# Patient Record
Sex: FEMALE | Race: WHITE | NOT HISPANIC OR LATINO | Employment: FULL TIME | URBAN - METROPOLITAN AREA
[De-identification: names, ages, dates, MRNs, and addresses within clinical notes are randomized per-mention and may not be internally consistent; named-entity substitution may affect disease eponyms.]

---

## 2019-06-10 ENCOUNTER — TRANSFERRED RECORDS (OUTPATIENT)
Dept: HEALTH INFORMATION MANAGEMENT | Facility: CLINIC | Age: 39
End: 2019-06-10

## 2019-06-10 LAB
HPV ABSTRACT: NORMAL
PAP-ABSTRACT: NORMAL

## 2019-07-23 ENCOUNTER — TRANSFERRED RECORDS (OUTPATIENT)
Dept: HEALTH INFORMATION MANAGEMENT | Facility: CLINIC | Age: 39
End: 2019-07-23

## 2019-12-01 ENCOUNTER — TRANSFERRED RECORDS (OUTPATIENT)
Dept: HEALTH INFORMATION MANAGEMENT | Facility: CLINIC | Age: 39
End: 2019-12-01

## 2019-12-01 LAB — PAP SMEAR - HIM PATIENT REPORTED: NEGATIVE

## 2020-05-27 ENCOUNTER — RECORDS - HEALTHEAST (OUTPATIENT)
Dept: ADMINISTRATIVE | Facility: OTHER | Age: 40
End: 2020-05-27

## 2020-05-27 ENCOUNTER — TRANSFERRED RECORDS (OUTPATIENT)
Dept: HEALTH INFORMATION MANAGEMENT | Facility: CLINIC | Age: 40
End: 2020-05-27

## 2020-08-11 ENCOUNTER — VIRTUAL VISIT (OUTPATIENT)
Dept: FAMILY MEDICINE | Facility: CLINIC | Age: 40
End: 2020-08-11
Payer: COMMERCIAL

## 2020-08-11 DIAGNOSIS — E03.9 HYPOTHYROIDISM, UNSPECIFIED TYPE: Primary | ICD-10-CM

## 2020-08-11 DIAGNOSIS — G43.009 MIGRAINE WITHOUT AURA AND WITHOUT STATUS MIGRAINOSUS, NOT INTRACTABLE: ICD-10-CM

## 2020-08-11 PROCEDURE — 99203 OFFICE O/P NEW LOW 30 MIN: CPT | Mod: 95 | Performed by: NURSE PRACTITIONER

## 2020-08-11 RX ORDER — AMITRIPTYLINE HYDROCHLORIDE 10 MG/1
10 TABLET ORAL AT BEDTIME
Qty: 90 TABLET | Refills: 1 | Status: SHIPPED | OUTPATIENT
Start: 2020-08-11 | End: 2022-03-29

## 2020-08-11 RX ORDER — LEVOTHYROXINE SODIUM 75 UG/1
75 TABLET ORAL DAILY
Qty: 90 TABLET | Refills: 3 | Status: SHIPPED | OUTPATIENT
Start: 2020-08-11 | End: 2021-08-12

## 2020-08-11 RX ORDER — LEVOTHYROXINE SODIUM 75 UG/1
75 TABLET ORAL DAILY
COMMUNITY
End: 2020-08-11

## 2020-08-11 NOTE — PROGRESS NOTES
"Dinorah Mai is a 40 year old female who is being evaluated via a billable video visit.      The patient has been notified of following:     \"This video visit will be conducted via a call between you and your physician/provider. We have found that certain health care needs can be provided without the need for an in-person physical exam.  This service lets us provide the care you need with a video conversation.  If a prescription is necessary we can send it directly to your pharmacy.  If lab work is needed we can place an order for that and you can then stop by our lab to have the test done at a later time.    Video visits are billed at different rates depending on your insurance coverage.  Please reach out to your insurance provider with any questions.    If during the course of the call the physician/provider feels a video visit is not appropriate, you will not be charged for this service.\"    Patient has given verbal consent for Video visit? Yes  How would you like to obtain your AVS? MyChart  If you are dropped from the video visit, the video invite should be resent to: mychart   Will anyone else be joining your video visit? No    Subjective     Dinorah Mai is a 40 year old female who presents today via video visit for the following health issues:    Health system         Video Start Time: 7:45 AM    Former care in [place].   Moved to [facility] here.      Hypothyroidism Follow-up      Since last visit, patient describes the following symptoms: Weight stable, no hair loss, no skin changes, no constipation, no loose stools      How many servings of fruits and vegetables do you eat daily?  4 or more    On average, how many sweetened beverages do you drink each day (Examples: soda, juice, sweet tea, etc.  Do NOT count diet or artificially sweetened beverages)?   0    How many days per week do you exercise enough to make your heart beat faster? 5    How many minutes a day do you exercise enough " to make your heart beat faster? 30 - 60    How many days per week do you miss taking your medication? 0      Using amitriptyline daily for migraine prophylaxis.    Working well, rare migraines except with her periods.    Was getting migraines 2-3 times a week.    MUCH better on elavil.        There is no problem list on file for this patient.    History reviewed. No pertinent surgical history.    Social History     Tobacco Use     Smoking status: Never Smoker     Smokeless tobacco: Never Used   Substance Use Topics     Alcohol use: Yes     Comment: 2 drinks a week      Family History   Problem Relation Age of Onset     No Known Problems Mother      No Known Problems Father          Current Outpatient Medications   Medication Sig Dispense Refill     levothyroxine (SYNTHROID/LEVOTHROID) 75 MCG tablet Take 75 mcg by mouth daily       No Known Allergies    Reviewed and updated as needed this visit by Provider         Review of Systems   Constitutional, HEENT, cardiovascular, pulmonary, GI, , musculoskeletal, neuro, skin, endocrine and psych systems are negative, except as otherwise noted.      Objective             Physical Exam     GENERAL: Healthy, alert and no distress  EYES: Eyes grossly normal to inspection.  No discharge or erythema, or obvious scleral/conjunctival abnormalities.  RESP: No audible wheeze, cough, or visible cyanosis.  No visible retractions or increased work of breathing.    SKIN: Visible skin clear. No significant rash, abnormal pigmentation or lesions.  NEURO: Cranial nerves grossly intact.  Mentation and speech appropriate for age.  PSYCH: Mentation appears normal, affect normal/bright, judgement and insight intact, normal speech and appearance well-groomed.              Assessment & Plan       ICD-10-CM    1. Hypothyroidism, unspecified type  E03.9 levothyroxine (SYNTHROID/LEVOTHROID) 75 MCG tablet     **TSH with free T4 reflex FUTURE anytime   2. Migraine without aura and without status  migrainosus, not intractable  G43.009 amitriptyline (ELAVIL) 10 MG tablet          Future labs placed   levothyroxine refilled at current dose.   F/u in 6 months after labs    Using amitriptyline daily for migraine prophylaxis.    Working well, rare migraines except with her periods.      PAP UTD    No follow-ups on file.    HUGH Carmen CNP  [facility]      Video-Visit Details    Type of service:  Video Visit    Video End Time: 7:55    Originating Location (pt. Location): Home    Distant Location (provider location):  [facility]     Platform used for Video Visit: [employer]    No follow-ups on file.       HUGH Carmen CNP

## 2021-01-04 ENCOUNTER — HEALTH MAINTENANCE LETTER (OUTPATIENT)
Age: 41
End: 2021-01-04

## 2021-04-07 ENCOUNTER — OFFICE VISIT - HEALTHEAST (OUTPATIENT)
Dept: FAMILY MEDICINE | Facility: CLINIC | Age: 41
End: 2021-04-07

## 2021-04-07 DIAGNOSIS — E06.3 HYPOTHYROIDISM DUE TO HASHIMOTO'S THYROIDITIS: ICD-10-CM

## 2021-04-07 DIAGNOSIS — F32.81 PMDD (PREMENSTRUAL DYSPHORIC DISORDER): ICD-10-CM

## 2021-04-07 DIAGNOSIS — N92.0 MENORRHAGIA WITH REGULAR CYCLE: ICD-10-CM

## 2021-04-07 LAB
ALBUMIN SERPL-MCNC: 4.4 G/DL (ref 3.5–5)
ALP SERPL-CCNC: 47 U/L (ref 45–120)
ALT SERPL W P-5'-P-CCNC: 25 U/L (ref 0–45)
ANION GAP SERPL CALCULATED.3IONS-SCNC: 11 MMOL/L (ref 5–18)
APTT PPP: 32 SECONDS (ref 24–37)
AST SERPL W P-5'-P-CCNC: 24 U/L (ref 0–40)
BILIRUB SERPL-MCNC: 0.4 MG/DL (ref 0–1)
BUN SERPL-MCNC: 11 MG/DL (ref 8–22)
CALCIUM SERPL-MCNC: 9.6 MG/DL (ref 8.5–10.5)
CHLORIDE BLD-SCNC: 104 MMOL/L (ref 98–107)
CO2 SERPL-SCNC: 26 MMOL/L (ref 22–31)
CREAT SERPL-MCNC: 0.77 MG/DL (ref 0.6–1.1)
ERYTHROCYTE [DISTWIDTH] IN BLOOD BY AUTOMATED COUNT: 12.2 % (ref 11–14.5)
ESTRADIOL SERPL-MCNC: 54 PG/ML
FERRITIN SERPL-MCNC: 53 NG/ML (ref 10–130)
FSH SERPL-ACNC: 5.1 MIU/ML
GFR SERPL CREATININE-BSD FRML MDRD: >60 ML/MIN/1.73M2
GLUCOSE BLD-MCNC: 92 MG/DL (ref 70–125)
HCT VFR BLD AUTO: 43.1 % (ref 35–47)
HGB BLD-MCNC: 14 G/DL (ref 12–16)
INR PPP: 1.01 (ref 0.9–1.1)
MCH RBC QN AUTO: 29.7 PG (ref 27–34)
MCHC RBC AUTO-ENTMCNC: 32.5 G/DL (ref 32–36)
MCV RBC AUTO: 91 FL (ref 80–100)
PLATELET # BLD AUTO: 300 THOU/UL (ref 140–440)
PMV BLD AUTO: 8.6 FL (ref 7–10)
POTASSIUM BLD-SCNC: 4.3 MMOL/L (ref 3.5–5)
PROGEST SERPL-MCNC: <0.1 NG/ML
PROT SERPL-MCNC: 7.5 G/DL (ref 6–8)
RBC # BLD AUTO: 4.72 MILL/UL (ref 3.8–5.4)
SODIUM SERPL-SCNC: 141 MMOL/L (ref 136–145)
T3FREE SERPL-MCNC: 2.8 PG/ML (ref 1.9–3.9)
T4 FREE SERPL-MCNC: 1 NG/DL (ref 0.7–1.8)
TSH SERPL DL<=0.005 MIU/L-ACNC: 2.61 UIU/ML (ref 0.3–5)
WBC: 6.8 THOU/UL (ref 4–11)

## 2021-04-07 ASSESSMENT — MIFFLIN-ST. JEOR: SCORE: 1232.06

## 2021-04-13 ENCOUNTER — COMMUNICATION - HEALTHEAST (OUTPATIENT)
Dept: FAMILY MEDICINE | Facility: CLINIC | Age: 41
End: 2021-04-13

## 2021-05-12 ENCOUNTER — OFFICE VISIT - HEALTHEAST (OUTPATIENT)
Dept: FAMILY MEDICINE | Facility: CLINIC | Age: 41
End: 2021-05-12

## 2021-05-12 DIAGNOSIS — F51.04 PSYCHOPHYSIOLOGICAL INSOMNIA: ICD-10-CM

## 2021-05-12 DIAGNOSIS — G89.29 CHRONIC NECK PAIN: ICD-10-CM

## 2021-05-12 DIAGNOSIS — Z12.31 VISIT FOR SCREENING MAMMOGRAM: ICD-10-CM

## 2021-05-12 DIAGNOSIS — M54.2 CHRONIC NECK PAIN: ICD-10-CM

## 2021-05-12 DIAGNOSIS — F43.0 ACUTE REACTION TO STRESS: ICD-10-CM

## 2021-05-12 ASSESSMENT — ANXIETY QUESTIONNAIRES
2. NOT BEING ABLE TO STOP OR CONTROL WORRYING: SEVERAL DAYS
IF YOU CHECKED OFF ANY PROBLEMS ON THIS QUESTIONNAIRE, HOW DIFFICULT HAVE THESE PROBLEMS MADE IT FOR YOU TO DO YOUR WORK, TAKE CARE OF THINGS AT HOME, OR GET ALONG WITH OTHER PEOPLE: NOT DIFFICULT AT ALL
GAD7 TOTAL SCORE: 3
5. BEING SO RESTLESS THAT IT IS HARD TO SIT STILL: NOT AT ALL
3. WORRYING TOO MUCH ABOUT DIFFERENT THINGS: SEVERAL DAYS
6. BECOMING EASILY ANNOYED OR IRRITABLE: NOT AT ALL
4. TROUBLE RELAXING: NOT AT ALL
7. FEELING AFRAID AS IF SOMETHING AWFUL MIGHT HAPPEN: NOT AT ALL
1. FEELING NERVOUS, ANXIOUS, OR ON EDGE: SEVERAL DAYS

## 2021-05-12 ASSESSMENT — PATIENT HEALTH QUESTIONNAIRE - PHQ9: SUM OF ALL RESPONSES TO PHQ QUESTIONS 1-9: 3

## 2021-05-27 VITALS
SYSTOLIC BLOOD PRESSURE: 110 MMHG | WEIGHT: 130 LBS | HEART RATE: 68 BPM | OXYGEN SATURATION: 99 % | DIASTOLIC BLOOD PRESSURE: 60 MMHG

## 2021-05-27 ASSESSMENT — PATIENT HEALTH QUESTIONNAIRE - PHQ9: SUM OF ALL RESPONSES TO PHQ QUESTIONS 1-9: 3

## 2021-05-28 ASSESSMENT — ANXIETY QUESTIONNAIRES: GAD7 TOTAL SCORE: 3

## 2021-06-01 ENCOUNTER — COMMUNICATION - HEALTHEAST (OUTPATIENT)
Dept: FAMILY MEDICINE | Facility: CLINIC | Age: 41
End: 2021-06-01

## 2021-06-01 DIAGNOSIS — F32.81 PMDD (PREMENSTRUAL DYSPHORIC DISORDER): ICD-10-CM

## 2021-06-05 VITALS
WEIGHT: 132.13 LBS | DIASTOLIC BLOOD PRESSURE: 60 MMHG | SYSTOLIC BLOOD PRESSURE: 100 MMHG | HEIGHT: 63 IN | HEART RATE: 89 BPM | TEMPERATURE: 98 F | BODY MASS INDEX: 23.41 KG/M2 | OXYGEN SATURATION: 98 %

## 2021-06-16 PROBLEM — N92.0 MENORRHAGIA WITH REGULAR CYCLE: Status: ACTIVE | Noted: 2021-04-07

## 2021-06-16 PROBLEM — E06.3 HYPOTHYROIDISM DUE TO HASHIMOTO'S THYROIDITIS: Status: ACTIVE | Noted: 2021-04-07

## 2021-06-16 PROBLEM — F32.81 PMDD (PREMENSTRUAL DYSPHORIC DISORDER): Status: ACTIVE | Noted: 2021-04-07

## 2021-06-16 NOTE — PROGRESS NOTES
Assessment & Plan   1. PMDD (premenstrual dysphoric disorder)  Worsening depression and anxiety symptoms in a cyclical pattern.  Also significant familial stressors currently. Interested in medication management.  Discussed option of OCPs and she has not done well with these before, worsening migraines.  Will start on sertraline and follow up 4-6 weeks for recheck.    - sertraline (ZOLOFT) 50 MG tablet; Take 1 tablet (50 mg total) by mouth daily.  Dispense: 30 tablet; Refill: 1    2. Menorrhagia with regular cycle  Discussed management options.  She has experienced worsening migraines with essentially all birth control methods.  Interested in ablation though insurance currently not covering.  Check labs. Consider referral to OBGyn if she would like to pursue management for this.    - Estradiol  - Progesterone  - Follicle Stimulating Hormone (FSH)  - Thyroid Cascade  - HM2(CBC w/o Differential)  - Ferritin  - Comprehensive Metabolic Panel  - INR  - APTT(PTT)    3. Hypothyroidism due to Hashimoto's thyroiditis  Recheck labs and advise on dosing.   - levothyroxine (SYNTHROID, LEVOTHROID) 75 MCG tablet; Take 1 tablet (75 mcg total) by mouth daily.  Dispense: 90 tablet; Refill: 0  - Thyroid Cascade  - T4, Free  - T3 (Triiodothyronine), Free    Sunshine Aragon CNP    Subjective   Chief Complaint:  Annual Exam (non-fasting ) and Establish Care    HPI:   Dinorah Mai is a 41 y.o. female who presents for establish care    She is a new patient.  Has followed with a naturopath and is here to establish care and discuss symptoms of PMDD and heavy menstrual bleeding. History notable for hypothyroidism, migraine, otherwise negative.   with three children.      Hypothyroidism:  Stable on levothyroxine for seven years.  Ultrasound with stable goiter.      Heavy menstrual bleeding:  She states that this began several years ago after the birth of her youngest child.  Has been worked up with pelvic ultrasound.  Bleeding has a  "regular pattern, monthly.  Very heavy bleeding for the first three days, then lasts for 4-5 days. No intermenstrual bleeding.  Pap normal a year and a half ago.  Underoing hormonal workup with her naturopath at [facility].  No FMH of early menopause. Has not done well with OCPs, Nuvaring or IUD in the past.  All caused worsening headache.      Mood:  Notes a history of possible PMDD. This has worsened over the past year and experiencing anxiety and depression symptoms outside of the two weeks prior to her period.  States symptoms are more severe and last longer.  Quite bothered by this and interested in medication management.  Has not done well with OCPs in the past. Difficult year for her family.  Mom has ALS and feels weeks left to live.  undergoing workup for MS currently.  Three kids all distance learning.       LMP 3/30    Allergies:  has No Known Allergies.    SH/FH:  Social History and Family History reviewed and updated.   Tobacco Status:  She  reports that she has never smoked. She has never used smokeless tobacco.    Review of Systems:  A complete head to toe ROS is negative unless otherwise noted in HPI    Objective     Vitals:    04/07/21 1044   BP: 100/60   Patient Site: Left Arm   Patient Position: Sitting   Cuff Size: Adult Regular   Pulse: 89   Temp: 98  F (36.7  C)   TempSrc: Oral   SpO2: 98%   Weight: 132 lb 2 oz (59.9 kg)   Height: 5' 2.6\" (1.59 m)       Physical Exam:  GENERAL: Alert, well-appearing .   PSYCH: Tearful.  Dysphoric mood.  Good judgment and insight.                "

## 2021-06-16 NOTE — PATIENT INSTRUCTIONS - HE
Recommendations from today's visit                                                       It was nice to meet you today, Dinorah!    1. Mood:  We will start you on Zoloft, 25mg (1/2 tab) for one week and then increase to 50mg (1 tab) daily.  We will meet back in one month to see how you are doing with this and adjust as we need to.      2. Bleeding:  We are checking a number of labs today and will follow up on any abnormal results.     3. Thyroid:  I will let you know if we need to adjust your levothyroxine dose     Next appointment: one month    To reschedule your appointment, please call the clinic directly at [phone].   It was a pleasure seeing you today! I look forward to seeing you again.

## 2021-06-17 NOTE — PROGRESS NOTES
Assessment & Plan   1. Acute reaction to stress  Did not tolerate sertraline d/t sedation. Anxiety has improved with time and no longer present on a daily basis.  Interested in prescription to have on hand for more severe anxiety and stress.  Mother currently in hospice and  recently diagnosed with MS.  She has taken alprazolam in the past and tolerated this. Discussed risks, side effects.  Understands med checks every six months. Follow up if anxiety worsens.   - ALPRAZolam (XANAX) 0.5 MG tablet; Take 1 tablet (0.5 mg total) by mouth every 6 (six) hours.  Dispense: 30 tablet; Refill: 0    2. Chronic neck pain  Previous improvement with amitriptyline.  Discussed side effects and titration.  Start with 10mg dose and increase as needed up to 50mg.   - amitriptyline (ELAVIL) 10 MG tablet; Take 1 tablet (10 mg total) by mouth at bedtime. For chronic neck pain and insomnia  Dispense: 90 tablet; Refill: 1    3. Psychophysiological insomnia  - amitriptyline (ELAVIL) 10 MG tablet; Take 1 tablet (10 mg total) by mouth at bedtime. For chronic neck pain and insomnia  Dispense: 90 tablet; Refill: 1    4. Visit for screening mammogram  Reviewed various mammography guidelines and she would like to begin screening this year.    - Mammo Screening Bilateral; Future    Sunshine Aragon CNP    Subjective   Chief Complaint:  Follow-up    HPI:   Dinorah Mai is a 41 y.o. female who presents for follow up.      Following up today for PMDD.  Started on sertraline 50mg.  She states she took the medication for one week and felt extremely tired.  Stopped taking.  She `notes that was an extremely difficult time related to family stressors and she does feel anxiety has improved significantly since that time.  She continues to experience intermittent episodes of anxiety and is interested in discussing a prn medication.  Her mother is on hospice and her  recently diagnosed with MS.  She knows there will be some stressful times  ahead and would like to have something to take if she feels she cannot manage with her own techniques.      Chronic neck pain:  She states she deals with chronic muscular neck pain.  Has followed with chiropractic, massage, acupuncture and now pursuing craniosacral therapy.  She was prescribed low dose amitriptyline previously and this seemed to help quite a bit with pain as well as difficulty falling asleep.  Interested in new prescription.       Allergies:  has No Known Allergies.    SH/FH:  Social History and Family History reviewed and updated.   Tobacco Status:  She  reports that she has never smoked. She has never used smokeless tobacco.    Review of Systems:  A complete head to toe ROS is negative unless otherwise noted in HPI    Objective     Vitals:    05/12/21 1204   BP: 110/60   Pulse: 68   SpO2: 99%   Weight: 130 lb (59 kg)       Physical Exam:  GENERAL: Alert, well-appearing   PSYCH: Pleasant mood, affect appropriate.  Good judgment and insight

## 2021-06-21 NOTE — LETTER
Letter by Sunshine Aragon CNP at      Author: Sunshine Aragon CNP Service: -- Author Type: --    Filed:  Encounter Date: 4/13/2021 Status: (Other)         Dinorah Mai  [address] [place] [address]             April 13, 2021         Dear Ms. Mai,    Below are the results from your recent visit:    Resulted Orders   Estradiol   Result Value Ref Range    Estradiol 54 pg/mL    Narrative    Males:  Prepubertal.................<12 pg/mL  Adult........................10-60 pg/mL    Females:  Prepubertal.................<8 pg/mL  Early Follicular............ pg/mL  Late Follicular.............100-400 pg/mL  Luteal...................... pg/mL  Postmenopausal..............<18 pg/mL   Progesterone   Result Value Ref Range    Progesterone <0.1 ng/mL    Narrative    Males:...............................0.0-1.0 ng/mL  Females:  Follicular...........................0.2-0.9 ng/mL  Luteal...............................2.5-28.0 ng/mL  Mid-Luteal...........................5.7-27.0 ng/mL  1st Trimester........................9.0-81.0 ng/mL  2nd Trimester........................19.0-89.0 ng/mL  3rd Trimester........................55.0-255.0 ng/mL  Birth Control Pill...................0.0-0.6 ng/mL  Female >60 years.....................0.0-0.6 ng/mL   Follicle Stimulating Hormone (FSH)   Result Value Ref Range    FSH 5.1 mIU/mL      Comment:      Females:     Prepubertal     0-10 mIU/mL    Follicular      3-20 mIU/mL    Luteal          0-12 mIU/mL    Ovulatory       9-26 mIU/mL    Postmenopausal   mIU/mL   Thyroid Cascade   Result Value Ref Range    TSH 2.61 0.30 - 5.00 uIU/mL   T4, Free   Result Value Ref Range    Free T4 1.0 0.7 - 1.8 ng/dL   T3 (Triiodothyronine), Free   Result Value Ref Range    T3, Free 2.8 1.9 - 3.9 pg/mL   HM2(CBC w/o Differential)   Result Value Ref Range    WBC 6.8 4.0 - 11.0 thou/uL    RBC 4.72 3.80 - 5.40 mill/uL    Hemoglobin 14.0 12.0 - 16.0 g/dL    Hematocrit 43.1 35.0 -  47.0 %    MCV 91 80 - 100 fL    MCH 29.7 27.0 - 34.0 pg    MCHC 32.5 32.0 - 36.0 g/dL    RDW 12.2 11.0 - 14.5 %    Platelets 300 140 - 440 thou/uL    MPV 8.6 7.0 - 10.0 fL   Ferritin   Result Value Ref Range    Ferritin 53 10 - 130 ng/mL   Comprehensive Metabolic Panel   Result Value Ref Range    Sodium 141 136 - 145 mmol/L    Potassium 4.3 3.5 - 5.0 mmol/L    Chloride 104 98 - 107 mmol/L    CO2 26 22 - 31 mmol/L    Anion Gap, Calculation 11 5 - 18 mmol/L    Glucose 92 70 - 125 mg/dL    BUN 11 8 - 22 mg/dL    Creatinine 0.77 0.60 - 1.10 mg/dL    GFR MDRD Af Amer >60 >60 mL/min/1.73m2    GFR MDRD Non Af Amer >60 >60 mL/min/1.73m2    Bilirubin, Total 0.4 0.0 - 1.0 mg/dL    Calcium 9.6 8.5 - 10.5 mg/dL    Protein, Total 7.5 6.0 - 8.0 g/dL    Albumin 4.4 3.5 - 5.0 g/dL    Alkaline Phosphatase 47 45 - 120 U/L    AST 24 0 - 40 U/L    ALT 25 0 - 45 U/L    Narrative    Fasting Glucose reference range is 70-99 mg/dL per  American Diabetes Association (ADA) guidelines.   INR   Result Value Ref Range    INR 1.01 0.90 - 1.10    Narrative    INR Therapeutic Ranges:  Mech. Valve 2.5-3.5  Post Surg.  2.0-3.0  DVT/PE      2.0-3.0   APTT(PTT)   Result Value Ref Range    PTT 32 24 - 37 seconds     COMMENT:   Dinorah, your labs overall look quite normal.  No indication of bleeding/clotting issues, normal iron stores, blood  count.  Your thyroid hormones are within normal range. All of your other hormones look normal except for your  progesterone which is just slightly low for the point you were at in your menstrual cycle.  Please let me know if  you have any questions.     Please call with questions or contact us using [employer].    Sincerely,        Electronically signed by Sunshine Aragon CNP

## 2021-06-25 NOTE — TELEPHONE ENCOUNTER
sertraline (ZOLOFT) 50 MG tablet [[id number]]    Electronically signed by: Sunshine Aragon CNP on 04/07/21 1127 Status: Discontinued   Ordering user: Sunshine Aragon CNP 04/07/21 1127 Authorized by: Sunshine Aragon CNP   Frequency: DAILY 04/07/21 - 05/12/21  Discontinued by: Sunshine Aragon CNP 05/12/21 1242   Diagnoses   PMDD (premenstrual dysphoric disorder) [F32.81]

## 2021-07-08 ENCOUNTER — VIRTUAL VISIT (OUTPATIENT)
Dept: FAMILY MEDICINE | Facility: CLINIC | Age: 41
End: 2021-07-08
Payer: COMMERCIAL

## 2021-07-08 DIAGNOSIS — K59.00 CONSTIPATION, UNSPECIFIED CONSTIPATION TYPE: ICD-10-CM

## 2021-07-08 DIAGNOSIS — K21.9 GASTROESOPHAGEAL REFLUX DISEASE, UNSPECIFIED WHETHER ESOPHAGITIS PRESENT: Primary | ICD-10-CM

## 2021-07-08 PROCEDURE — 99203 OFFICE O/P NEW LOW 30 MIN: CPT | Mod: 95 | Performed by: FAMILY MEDICINE

## 2021-07-08 NOTE — PROGRESS NOTES
Dinorah is a 41 year old who is being evaluated via a billable video visit.      How would you like to obtain your AVS? [employer]  If the video visit is dropped, the invitation should be resent by: Text to cell phone: [phone] (M)   Will anyone else be joining your video visit? No      Video Start Time: 10:22 AM    Assessment & Plan     Gastroesophageal reflux disease, unspecified whether esophagitis present  See suggestions below.     Constipation, unspecified constipation type  Lifelong tendency toward constipation. Eats plenty of fruits and veggies, but is eating a higher carb diet with more bread and past as well. She hydrates well. Magnesium supplement helps, but not enough. Start daily miralax. See below    Patient Instructions   1) Start miralax over-the-counter once daily until you are having daily soft stools and continue if needed to maintain normal stools.   2) Start to work on cutting out caffeine. You can do this gradually and use over-the-counter Pepcid (famotidine) to help with acid reflux symptoms in the meantime. If you are not improving in the next two to three weeks, schedule a follow up visit.   3) Try to send your test results through a [employer] message. You can take a picture and attach to the message.   4) See other dietary/lifestyle measures for acid reflux below.   5) You may consider cutting down on some of the carbohydrates (bread and pasta) and focusing on fruits, veggies, whole grains to see if this helps with bloating (sugar and flour as well as salt can drive water retention and bloating).     Patient Education     Tips to Control Acid Reflux    To control acid reflux, you ll need to make some basic diet and lifestyle changes. The simple steps outlined below may be all you ll need to ease discomfort.   Watch what you eat    Don't have fatty foods or spicy foods.    Eat fewer acidic foods, such as citrus and tomato-based foods. These can increase symptoms.    Limit drinking alcohol,  caffeine, and fizzy beverages. All increase acid reflux.    Try limiting chocolate, peppermint, and spearmint. These can make acid reflux worse in some people.    Watch when you eat    Don't lie down for 3 hours after eating.    Don't snack before going to bed.    Raise your head  Raising your head and upper body by 4 to 6 inches helps limit reflux when you re lying down. Put blocks under the head of your bed frame or a wedge under your mattress to raise it.   Other changes    Lose weight, if you need to    Don t exercise near bedtime    Don't wear tight-fitting clothes    Limit aspirin and ibuprofen    Stop smoking    [employer] last reviewed this educational content on 6/1/2019 2000-2021 [employer]. All rights reserved. This information is not intended as a substitute for professional medical care. Always follow your healthcare professional's instructions.                36 minutes spent on the date of the encounter doing chart review, history and exam, documentation and further activities per the note            Return in about 2 weeks (around 7/22/2021) for Follow up if not improving.    Sayra Bello MD   [facility]    Tani Copeland is a 41 year old who presents for the following health issues     Review of records from [facility]. Pt's mom has been in hospice and  recently diagnosed with MS. She has been working with PCP in managing anxiety/stress. Taking xanax prn and has amitriptyline to use nightly for insomnia and neck pain. She did not tolerate sertraline due to sedation.     HPI     GERD/Heartburn  Onset/Duration: 6 months   Description: Lots of bloating, sinus congestion, throat acid - feel like I have acid reflux, gerd,Hiatal Hernia, esophogeal issue  Intensity: mild  Progression of Symptoms: same and constant  Accompanying Signs & Symptoms:  Does it feel like food gets stuck or trouble swallowing: YES  Nausea: no  Vomiting (bloody?):  no  Abdominal Pain: no  Black-Tarry stools: no  Bloody stools: no  History:  Previous similar episodes: YES   Previous ulcers: no  Precipitating factors:   Caffeine use: YES  Alcohol use: YES  NSAID/Aspirin use: YES a couple of times a week   Tobacco use: no  Worse with caffeinated drinks.  Alleviating factors: None  Therapies tried and outcome:             Lifestyle changes: None            Medications: otc medication with no relief          Review of Systems          Objective           Vitals:  No vitals were obtained today due to virtual visit.    Physical Exam   GENERAL: Healthy, alert and no distress  EYES: Eyes grossly normal to inspection.  No discharge or erythema, or obvious scleral/conjunctival abnormalities.  RESP: No audible wheeze, cough, or visible cyanosis.  No visible retractions or increased work of breathing.    SKIN: Visible skin clear. No significant rash, abnormal pigmentation or lesions.  NEURO: Cranial nerves grossly intact.  Mentation and speech appropriate for age.  PSYCH: Mentation appears normal, affect normal/bright, judgement and insight intact, normal speech and appearance well-groomed.                  Video-Visit Details    Type of service:  Video Visit    Video End Time:10:48 AM    Originating Location (pt. Location): Home    Distant Location (provider location):  [facility]     Platform used for Video Visit: [employer]

## 2021-07-08 NOTE — PATIENT INSTRUCTIONS
1) Start miralax over-the-counter once daily until you are having daily soft stools and continue if needed to maintain normal stools.   2) Start to work on cutting out caffeine. You can do this gradually and use over-the-counter Pepcid (famotidine) to help with acid reflux symptoms in the meantime. If you are not improving in the next two to three weeks, schedule a follow up visit.   3) Try to send your test results through a [employer] message. You can take a picture and attach to the message.   4) See other dietary/lifestyle measures for acid reflux below.   5) You may consider cutting down on some of the carbohydrates (bread and pasta) and focusing on fruits, veggies, whole grains to see if this helps with bloating (sugar and flour as well as salt can drive water retention and bloating).     Patient Education     Tips to Control Acid Reflux    To control acid reflux, you ll need to make some basic diet and lifestyle changes. The simple steps outlined below may be all you ll need to ease discomfort.   Watch what you eat    Don't have fatty foods or spicy foods.    Eat fewer acidic foods, such as citrus and tomato-based foods. These can increase symptoms.    Limit drinking alcohol, caffeine, and fizzy beverages. All increase acid reflux.    Try limiting chocolate, peppermint, and spearmint. These can make acid reflux worse in some people.    Watch when you eat    Don't lie down for 3 hours after eating.    Don't snack before going to bed.    Raise your head  Raising your head and upper body by 4 to 6 inches helps limit reflux when you re lying down. Put blocks under the head of your bed frame or a wedge under your mattress to raise it.   Other changes    Lose weight, if you need to    Don t exercise near bedtime    Don't wear tight-fitting clothes    Limit aspirin and ibuprofen    Stop smoking    [employer] last reviewed this educational content on 6/1/2019 2000-2021 [employer]. All rights reserved.  This information is not intended as a substitute for professional medical care. Always follow your healthcare professional's instructions.

## 2021-08-11 DIAGNOSIS — E03.9 HYPOTHYROIDISM, UNSPECIFIED TYPE: ICD-10-CM

## 2021-08-12 ENCOUNTER — MYC MEDICAL ADVICE (OUTPATIENT)
Dept: FAMILY MEDICINE | Facility: CLINIC | Age: 41
End: 2021-08-12

## 2021-08-12 DIAGNOSIS — E03.9 HYPOTHYROIDISM, UNSPECIFIED TYPE: ICD-10-CM

## 2021-08-12 RX ORDER — LEVOTHYROXINE SODIUM 75 UG/1
TABLET ORAL
Qty: 90 TABLET | Refills: 3 | Status: SHIPPED | OUTPATIENT
Start: 2021-08-12 | End: 2022-08-17

## 2021-08-16 RX ORDER — LEVOTHYROXINE SODIUM 75 UG/1
TABLET ORAL
Qty: 90 TABLET | Refills: 3 | OUTPATIENT
Start: 2021-08-16

## 2021-08-18 ENCOUNTER — TELEPHONE (OUTPATIENT)
Dept: FAMILY MEDICINE | Facility: CLINIC | Age: 41
End: 2021-08-18

## 2021-08-18 NOTE — TELEPHONE ENCOUNTER
Reason for Call:   Appointment requests    Detailed comments: Patient would like approval from Dr. Bello to have her 3 children seen by provider. Please call to advise if ok due to closed practice and would like approval for all 3 to be seen on the same day.     Phone Number Patient can be reached at: Home number on file [phone] (home)    Best Time: any    Can we leave a detailed message on this number? YES    Call taken on 8/18/2021 at 2:38 PM by Sandy Clements

## 2021-08-20 NOTE — TELEPHONE ENCOUNTER
I would be happy to see her kids! But let her know we only see two siblings per day. Thanks! Sayra Bello M.D.

## 2021-10-11 ENCOUNTER — HEALTH MAINTENANCE LETTER (OUTPATIENT)
Age: 41
End: 2021-10-11

## 2021-11-09 ENCOUNTER — MYC MEDICAL ADVICE (OUTPATIENT)
Dept: FAMILY MEDICINE | Facility: CLINIC | Age: 41
End: 2021-11-09

## 2021-11-09 ENCOUNTER — VIRTUAL VISIT (OUTPATIENT)
Dept: FAMILY MEDICINE | Facility: CLINIC | Age: 41
End: 2021-11-09
Payer: COMMERCIAL

## 2021-11-09 DIAGNOSIS — L71.0 PERIORAL DERMATITIS: Primary | ICD-10-CM

## 2021-11-09 PROCEDURE — 99213 OFFICE O/P EST LOW 20 MIN: CPT | Mod: 95 | Performed by: STUDENT IN AN ORGANIZED HEALTH CARE EDUCATION/TRAINING PROGRAM

## 2021-11-09 RX ORDER — METRONIDAZOLE 10 MG/G
GEL TOPICAL DAILY
Qty: 60 G | Refills: 1 | Status: SHIPPED | OUTPATIENT
Start: 2021-11-09 | End: 2022-01-13

## 2021-11-09 NOTE — PROGRESS NOTES
Dinorah is a 41 year old who is being evaluated via a billable video visit.      How would you like to obtain your AVS? MyChart  If the video visit is dropped, the invitation should be resent by: Text to cell phone: [phone] (Mobile  Will anyone else be joining your video visit? No      Video Start Time: 1:32 PM    Assessment & Plan     Perioral dermatitis  Discussed resolution in symptoms can take up to 8 weeks but to discontinue gel if noting worsening prior to that time. In this case, schedule in person visit or we can refer her to dermatology. She is in agreement with this plan.   - metroNIDAZOLE (METROGEL) 1 % external gel  Dispense: 60 g; Refill: 1      Rylee Decker MD  [facility]    Subjective   Dinorah is a 41 year old who presents for the following health issues     History of Present Illness       She eats 4 or more servings of fruits and vegetables daily.She consumes 0 sweetened beverage(s) daily.She exercises with enough effort to increase her heart rate 30 to 60 minutes per day.  She exercises with enough effort to increase her heart rate 5 days per week.   She is taking medications regularly.       Possible ringworm on mouth/lips  No other symptoms   Has been applying antifungal cream which she started today   For about 1 week       Starting 10 days ago, she noted that her lips were really dry. She tried steamer, vaseline, humidifier. Noted excessive peeling, swelling, irritation. She is worried about a fungal infection. Hasn't noted any rash. She has noted a little redness around her lips. Notes a small amount of peeling above her lip.     Denies new personal care products--creams, lotions, and makeup. Denies food allergies. No symptoms inside of the mouth. No spreading. She has been using vaseline.          Objective           Vitals:  No vitals were obtained today due to virtual visit.    Physical Exam   GENERAL: Healthy, alert and no distress  EYES: Eyes grossly  normal to inspection.  No discharge or erythema, or obvious scleral/conjunctival abnormalities.  RESP: No audible wheeze, cough, or visible cyanosis.  No visible retractions or increased work of breathing.    SKIN: Visible skin clear. No significant rash, abnormal pigmentation or lesions.  NEURO: Cranial nerves grossly intact.  Mentation and speech appropriate for age.  PSYCH: Mentation appears normal, affect normal/bright, judgement and insight intact, normal speech and appearance well-groomed.            Video-Visit Details    Type of service:  Video Visit    Video End Time:1:41 PM    Originating Location (pt. Location): Home    Distant Location (provider location):  [facility]     Platform used for Video Visit: [email or web address]

## 2021-11-10 NOTE — TELEPHONE ENCOUNTER
Central Prior Authorization Team   Phone: [phone]      PA Initiation    Medication: metroNIDAZOLE (METROGEL) 1 % external gel, rec 11-10-21  Insurance Company: [employer] - Phone [phone] Fax [phone]  Pharmacy Filling the Rx: [id number] [address] AT [address] [place] [address] & [facility]  Filling Pharmacy Phone: [phone]  Filling Pharmacy Fax: [phone]  Start Date: 11/10/2021

## 2021-11-10 NOTE — TELEPHONE ENCOUNTER
"PA started for metroNIDAZOLE (METROGEL) 1 % external gel.  To submit the PA:  1. Go to [email or web address] and click Enter a key  2. Enter the pt's last name and date of birth and the bejarano   Patient last name:Pau   :[birth year]   Bejarano:[id number]  3. Complete the form and click \"Send to Plan\"    "

## 2021-11-11 NOTE — TELEPHONE ENCOUNTER
Prior Authorization Approval    Authorization Effective Date: 10/11/2021  Authorization Expiration Date: 11/10/2022  Medication: metroNIDAZOLE (METROGEL) 1 % external gel, rec 11-10-21-APPROVED   Approved Dose/Quantity:   Reference #: CASE ID [id number]   Insurance Company: [employer] - Phone [phone] Fax [phone]  Expected CoPay: Over $230 dollars after insurance  CoPay Card Available: No    Foundation Assistance Needed:    Which Pharmacy is filling the prescription (Not needed for infusion/clinic administered): [id number] - [place], [place] - [address] [place] [address] AT [address] [place] [address]  Pharmacy Notified: Yes-The pharmacy needs to order in the product and it will be in tomorrow in the afternoon.  Patient Notified: Yes-Sending to the provider in regards to the cost so that they can inform the patient.

## 2021-11-12 RX ORDER — ERYTHROMYCIN 20 MG/G
GEL TOPICAL 2 TIMES DAILY
Qty: 60 G | Refills: 0 | Status: SHIPPED | OUTPATIENT
Start: 2021-11-12 | End: 2022-01-11

## 2021-11-12 NOTE — TELEPHONE ENCOUNTER
Sent erythromycin instead. Hopefully this is less expensive. I notified héctor by phone.    Rylee Decker MD  [facility]  [phone]

## 2021-11-12 NOTE — TELEPHONE ENCOUNTER
I called [employer] to let them know that the PA was approved.  It did go through insurance, but patient has a high deductible that has not been met.  Price is $230 until her deductible is met.  I sent a My Chart message explaining this to patient.  Jovana Kimbrough RN  [facility]

## 2022-01-04 ENCOUNTER — IMMUNIZATION (OUTPATIENT)
Dept: NURSING | Facility: CLINIC | Age: 42
End: 2022-01-04
Payer: COMMERCIAL

## 2022-01-04 PROCEDURE — 0004A PR COVID VAC PFIZER DIL RECON 30 MCG/0.3 ML IM: CPT

## 2022-01-04 PROCEDURE — 91300 PR COVID VAC PFIZER DIL RECON 30 MCG/0.3 ML IM: CPT

## 2022-01-13 ENCOUNTER — VIRTUAL VISIT (OUTPATIENT)
Dept: FAMILY MEDICINE | Facility: CLINIC | Age: 42
End: 2022-01-13
Payer: COMMERCIAL

## 2022-01-13 DIAGNOSIS — G43.009 MIGRAINE WITHOUT AURA AND WITHOUT STATUS MIGRAINOSUS, NOT INTRACTABLE: Primary | ICD-10-CM

## 2022-01-13 PROCEDURE — 99214 OFFICE O/P EST MOD 30 MIN: CPT | Mod: 95 | Performed by: NURSE PRACTITIONER

## 2022-01-13 RX ORDER — FERROUS SULFATE 325(65) MG
1 TABLET ORAL PRN
COMMUNITY
End: 2023-10-02

## 2022-01-13 RX ORDER — MAGNESIUM GLUCONATE 30 MG(550)
30 TABLET ORAL AT BEDTIME
COMMUNITY
End: 2023-09-06

## 2022-01-13 NOTE — PROGRESS NOTES
Dinorah is a 41 year old who is being evaluated via a billable video visit.      How would you like to obtain your AVS? MyChart  If the video visit is dropped, the invitation should be resent by: [phone]  Will anyone else be joining your video visit? No      Video Start Time: 5:04 PM    1. Migraine without aura and without status migrainosus, not intractable  H/o chronic migraines, 1-2x/month.  No change in quality or intensity.  Occasionally has severe HA that does not respond to Excedrin Migraine and would like another option on hand.  Previously felt rizatriptan tasted poorly and felt too strong.  Will try sumatriptan and recommended taking with naproxen.  Advised on possible side effects. If she does not tolerate this then will consider other classes  - SUMAtriptan (IMITREX) 50 MG tablet; Take 1 tablet (50 mg) by mouth at onset of headache for migraine Take with dose of Aleve.  May repeat in 2 hours  Dispense: 12 tablet; Refill: 3    Subjective   Dinorah is a 41 year old who presents for the following health issues     HPI       Migraines for many years.  Aura symptoms with first migraine though not since. Typically hormonal.  1-2 per month.  Uses Excedrin Migraine. This often helps, but occasionally she will have a very severe HA.  After her COVID booster she experienced intense headache and nausea for two days.  She is interested in medication to have on hand for these situations.  The only medication she has tried in the past is rizatriptan.  She found the taste of this to be nauseating.  Helpful but 'knocked her out'.      Is prescribed amitriptyline 10mg for chronic neck pain.     Review of Systems   Constitutional, HEENT, cardiovascular, pulmonary, gi and gu systems are negative, except as otherwise noted.      Objective           Vitals:  No vitals were obtained today due to virtual visit.    Physical Exam   GENERAL: Healthy, alert and no distress  EYES: Eyes grossly normal to inspection.  No discharge or  erythema, or obvious scleral/conjunctival abnormalities.  NEURO: Cranial nerves grossly intact.  Mentation and speech appropriate for age.  PSYCH: Mentation appears normal, affect normal/bright, judgement and insight intact, normal speech and appearance well-groomed.                Video-Visit Details    Type of service:  Video Visit    Video End Time:5:24 PM    Originating Location (pt. Location): Home    Distant Location (provider location):  [facility]     Platform used for Video Visit: [employer]

## 2022-01-16 RX ORDER — SUMATRIPTAN 50 MG/1
50 TABLET, FILM COATED ORAL
Qty: 12 TABLET | Refills: 3 | Status: SHIPPED | OUTPATIENT
Start: 2022-01-16 | End: 2022-12-29

## 2022-01-30 ENCOUNTER — HEALTH MAINTENANCE LETTER (OUTPATIENT)
Age: 42
End: 2022-01-30

## 2022-02-23 ENCOUNTER — ANCILLARY PROCEDURE (OUTPATIENT)
Dept: MAMMOGRAPHY | Facility: CLINIC | Age: 42
End: 2022-02-23
Attending: NURSE PRACTITIONER
Payer: COMMERCIAL

## 2022-02-23 DIAGNOSIS — Z12.31 VISIT FOR SCREENING MAMMOGRAM: ICD-10-CM

## 2022-02-23 PROCEDURE — 77067 SCR MAMMO BI INCL CAD: CPT | Mod: TC | Performed by: RADIOLOGY

## 2022-03-27 ENCOUNTER — MYC MEDICAL ADVICE (OUTPATIENT)
Dept: FAMILY MEDICINE | Facility: CLINIC | Age: 42
End: 2022-03-27
Payer: COMMERCIAL

## 2022-03-29 ENCOUNTER — VIRTUAL VISIT (OUTPATIENT)
Dept: INTERNAL MEDICINE | Facility: CLINIC | Age: 42
End: 2022-03-29
Payer: COMMERCIAL

## 2022-03-29 DIAGNOSIS — Z00.00 PREVENTATIVE HEALTH CARE: ICD-10-CM

## 2022-03-29 DIAGNOSIS — Z11.4 SCREENING FOR HIV (HUMAN IMMUNODEFICIENCY VIRUS): ICD-10-CM

## 2022-03-29 DIAGNOSIS — G43.709 CHRONIC MIGRAINE WITHOUT AURA WITHOUT STATUS MIGRAINOSUS, NOT INTRACTABLE: Primary | ICD-10-CM

## 2022-03-29 DIAGNOSIS — Z11.59 NEED FOR HEPATITIS C SCREENING TEST: ICD-10-CM

## 2022-03-29 PROCEDURE — 99213 OFFICE O/P EST LOW 20 MIN: CPT | Mod: 95 | Performed by: INTERNAL MEDICINE

## 2022-03-29 RX ORDER — TOPIRAMATE 25 MG/1
25 TABLET, FILM COATED ORAL 2 TIMES DAILY
Qty: 60 TABLET | Refills: 11 | Status: SHIPPED | OUTPATIENT
Start: 2022-03-29 | End: 2022-09-14

## 2022-03-29 RX ORDER — AMITRIPTYLINE HYDROCHLORIDE 10 MG/1
10 TABLET ORAL AT BEDTIME
Qty: 90 TABLET | Refills: 3 | Status: SHIPPED | OUTPATIENT
Start: 2022-03-29 | End: 2022-12-29

## 2022-03-29 RX ORDER — FOLIC ACID 1 MG/1
1 TABLET ORAL DAILY
Qty: 90 TABLET | Refills: 3 | Status: SHIPPED | OUTPATIENT
Start: 2022-03-29 | End: 2023-04-21

## 2022-03-29 NOTE — PROGRESS NOTES
Dinorah is a 42 year old female being evaluated via a billable phone visit, and would like to be contacted via the following  Home number on file [phone] (home)     ASSESSMENT and PLAN:  1. Chronic migraine without aura without status migrainosus, not intractable  Referral to neurology neuro see below to headache clinic.  Also discussed arousal baby aspirin, folic acid, daily amitriptyline, and trial of topiramate  - Adult Neurology  Referral; Future  - folic acid (FOLVITE) 1 MG tablet; Take 1 tablet (1 mg) by mouth daily  Dispense: 90 tablet; Refill: 3  - topiramate (TOPAMAX) 25 MG tablet; Take 1 tablet (25 mg) by mouth 2 times daily  Dispense: 60 tablet; Refill: 11  - amitriptyline (ELAVIL) 10 MG tablet; Take 1 tablet (10 mg) by mouth At Bedtime  Dispense: 90 tablet; Refill: 3    2. Screening for HIV (human immunodeficiency virus)  - HIV Antigen Antibody Combo; Future    3. Need for hepatitis C screening test  - Hepatitis C Screen Reflex to HCV RNA Quant and Genotype; Future    4. Preventative health care  Reviewed vaccinations and health maintenance module  - REVIEW OF HEALTH MAINTENANCE PROTOCOL ORDERS; Future  - TDAP VACCINE (Adacel, Boostrix)  [[id number]]; Future  - HC FLU VAC PRESRV FREE QUAD SPLIT VIR > 6 MONTHS IM ([id number]); Future     5.  Hypothyroidism.  Reviewed mid range TSH and discussed that she could try a slightly higher dose if symptoms indicated     Patient Instructions   Take amitriptyline 10 mg each night    Baby aspirin 81 mg daily as trial    Folic acid 1 mg daily as trial    Neurology referral to discuss possible Botox    Topiramate 25 mg at bedtime then twice daily 1 month trial    Discussed possible adjustment of thyroid    Health maintenance orders placed including flu shot, Tdap, HIV and hepatitis C     Medication list carefully reviewed and corrected  Epic Merger Problem list addressed and updated     Return in about 6 months (around 9/29/2022) for using a video visit.    CHIEF  "COMPLAINT:  Chief Complaint   Patient presents with     Migraine     discuss possible Botox for migraines        HISTORY OF PRESENT ILLNESS:  Dinorah is a 42 year old female contacting the clinic today via phone for discussion of options for migraine headaches.  She has tried antidepressants, and Imitrex.  She takes amitriptyline but only as needed and when she took it more routinely does not recall an effect on headaches.  She is interested in exploring Botox through neurology.  We discussed baby aspirin daily for prevention, folic acid daily for prevention, amitriptyline nightly for prevention, and a trial of topiramate.  Previous use of sertraline caused unacceptable fatigue    History of hypothyroidism and feels comfortable at current dose of thyroid.  Discussed that slight adjustments could improve energy and still keep her TSH within the normal range    REVIEW OF SYSTEMS:  Frequent migraine headaches    PFSH:  Social History     Social History Narrative     Not on file     Mother  a few years ago    TOBACCO USE:  History   Smoking Status     Never Smoker   Smokeless Tobacco     Never Used       VITALS:  There were no vitals filed for this visit.  There were no vitals taken for this visit. Estimated body mass index is 23.33 kg/m  as calculated from the following:    Height as of 21: 1.59 m (5' 2.6\").    Weight as of 21: 59 kg (130 lb).    PHYSICAL EXAM:  (observations via Phone)  Alert and oriented    MEDICATIONS  Current Outpatient Medications   Medication Sig Dispense Refill     amitriptyline (ELAVIL) 10 MG tablet Take 1 tablet (10 mg) by mouth At Bedtime 90 tablet 3     ferrous sulfate (FEROSUL) 325 (65 Fe) MG tablet Take 1 tablet by mouth       folic acid (FOLVITE) 1 MG tablet Take 1 tablet (1 mg) by mouth daily 90 tablet 3     levothyroxine (SYNTHROID/LEVOTHROID) 75 MCG tablet TAKE 1 TABLET(75 MCG) BY MOUTH DAILY 90 tablet 3     Magnesium Gluconate 550 MG TABS Take 30 mg by mouth       " SUMAtriptan (IMITREX) 50 MG tablet Take 1 tablet (50 mg) by mouth at onset of headache for migraine Take with dose of Aleve.  May repeat in 2 hours 12 tablet 3     topiramate (TOPAMAX) 25 MG tablet Take 1 tablet (25 mg) by mouth 2 times daily 60 tablet 11       Notes summarized:   Labs, x-rays, cardiology, GI tests reviewed:   Recent Labs   Lab Test 04/07/21  1143   HGB 14.0      POTASSIUM 4.3   CR 0.77   TSH 2.61     No results found for: BHRRR89KHE  No results found for: CHOL  New orders:   Orders Placed This Encounter   Procedures     REVIEW OF HEALTH MAINTENANCE PROTOCOL ORDERS     TDAP VACCINE (Adacel, Boostrix)  [[id number]]     HC FLU VAC PRESRV FREE QUAD SPLIT VIR > 6 MONTHS IM ([id number])     HIV Antigen Antibody Combo     Hepatitis C Screen Reflex to HCV RNA Quant and Genotype     Adult Neurology  Referral       Independent review of:  Supplemental history by:      Patient would like to receive their AVS by Dominique Kennedy MD   [facility]    Phone Start Time: 4:08 PM  Phone End time:  4:34 PM  Conversation plus orders: 26 minutes  Dictation time:  3 minutes    The visit lasted a total of 27 minutes       HPI

## 2022-03-29 NOTE — PATIENT INSTRUCTIONS
Take amitriptyline 10 mg each night    Baby aspirin 81 mg daily as trial    Folic acid 1 mg daily as trial    Neurology referral to discuss possible Botox    Topiramate 25 mg at bedtime then twice daily 1 month trial    Discussed possible adjustment of thyroid    Health maintenance orders placed including flu shot, Tdap, HIV and hepatitis C

## 2022-05-23 ENCOUNTER — VIRTUAL VISIT (OUTPATIENT)
Dept: FAMILY MEDICINE | Facility: CLINIC | Age: 42
End: 2022-05-23
Payer: COMMERCIAL

## 2022-05-23 DIAGNOSIS — N92.0 MENORRHAGIA WITH REGULAR CYCLE: Primary | ICD-10-CM

## 2022-05-23 DIAGNOSIS — G43.009 MIGRAINE WITHOUT AURA AND WITHOUT STATUS MIGRAINOSUS, NOT INTRACTABLE: ICD-10-CM

## 2022-05-23 PROCEDURE — 99214 OFFICE O/P EST MOD 30 MIN: CPT | Mod: 95 | Performed by: NURSE PRACTITIONER

## 2022-05-23 NOTE — PROGRESS NOTES
Dinorah is a 42 year old who is being evaluated via a billable video visit.        How would you like to obtain your AVS? MyChart  If the video visit is dropped, the invitation should be resent by: Text to cell phone: [phone]  Will anyone else be joining your video visit? No      Video Start Time: 7:15 AM    1. Menorrhagia with regular cycle  History of heavy menstrual bleeding for > 5 years.  Previously worked up with OBGyn and told likely hormonal. She is interested in treatment for this. Prefers to avoid hormonal contraceptives. She has considered an ablation though states this is too costly at this time.  Previously treated with what sounds like Lysteda.  Will request records from her OBGyn and review workup prior to prescribing.     2. Migraine without aura and without status migrainosus, not intractable  Improvement with Topamax though has experienced significant sedation at 50mg.  Encouraged to drop down to just 25mg and stay at this dose for a month prior to an increase.  She wonders about taking it prn as opposed to preventative. I could not find any research to support this but do not see any danger in it if effective for her    Subjective   Dinorah is a 42 year old who presents for the following health issues     HPI     Visit today to discuss menstrual bleeding.  She states she has experienced heavier bleeding for at least 5 years.  Typically spotting for one week prior to period and then 2-3 days of heavy bleeding, changing a menstrual cup every two hours. She was previously seen by OBGyn in [place] and prescribed a medication though decided not to take this. She recalls it was non-hormonal.  She is now feeling ready to try something.  Prefers to avoid hormones.     Migraine:  Recent visit with Dr. Kennedy. Prescribed Topamax.  Took 25mg for one week then up to 50mg.  Felt this worked very well though caused sedation. Wondering about the ability to use as needed.       Review of Systems    Constitutional, HEENT, cardiovascular, pulmonary, gi and gu systems are negative, except as otherwise noted.      Objective           Vitals:  No vitals were obtained today due to virtual visit.    Physical Exam   GENERAL: Healthy, alert and no distress  EYES: Eyes grossly normal to inspection.  No discharge or erythema, or obvious scleral/conjunctival abnormalities.  PSYCH: Mentation appears normal, affect normal/bright, judgement and insight intact, normal speech and appearance well-groomed.                Video-Visit Details    Type of service:  Video Visit    Video End Time:7:44 AM    Originating Location (pt. Location): Home    Distant Location (provider location):  [facility]     Platform used for Video Visit: [employer]

## 2022-06-01 ENCOUNTER — MYC MEDICAL ADVICE (OUTPATIENT)
Dept: FAMILY MEDICINE | Facility: CLINIC | Age: 42
End: 2022-06-01
Payer: COMMERCIAL

## 2022-06-03 DIAGNOSIS — N92.0 MENORRHAGIA WITH REGULAR CYCLE: Primary | ICD-10-CM

## 2022-06-03 RX ORDER — TRANEXAMIC ACID 650 MG/1
1300 TABLET ORAL 3 TIMES DAILY
Qty: 15 TABLET | Refills: 3 | Status: SHIPPED | OUTPATIENT
Start: 2022-06-03 | End: 2022-09-13

## 2022-08-01 NOTE — TELEPHONE ENCOUNTER
FUTURE VISIT INFORMATION      FUTURE VISIT INFORMATION:    Date: 9/14/2022    Time: 1110am    Location: [facility]  REFERRAL INFORMATION:    Referring provider:  Sunshine Aragon CNP     Referring providers clinic:  [facility]     Reason for visit/diagnosis  Migraines     RECORDS REQUESTED FROM:       Clinic name Comments Records Status Imaging Status   Internal MARTHA Aragon-5/23/2022    Dr. Kennedy-3/29/2022 Epic No Images          [facility]   Requested  Requested                        9/8/2022-Called and Spoke with patient about external records and Images. Patient told me she was seen @ [facility]. Request for records faxed to [facility]-MR @ 831am    9/13/2022-[facility] records scanned to chart-MR @ 552am

## 2022-08-16 DIAGNOSIS — E03.9 HYPOTHYROIDISM, UNSPECIFIED TYPE: ICD-10-CM

## 2022-08-16 NOTE — TELEPHONE ENCOUNTER
"Routing refill request to provider for review/approval because:  Labs not current:  tsh    Last Written Prescription Date:  8/12/21  Last Fill Quantity: 90,  # refills: 3   Last office visit provider:  5/23/22     Requested Prescriptions   Pending Prescriptions Disp Refills     levothyroxine (SYNTHROID/LEVOTHROID) 75 MCG tablet [Pharmacy Med Name: LEVOTHYROXINE 0.075MG (75MCG) TABS] 90 tablet 3     Sig: TAKE 1 TABLET(75 MCG) BY MOUTH DAILY       Thyroid Protocol Failed - 8/16/2022  3:20 AM        Failed - Normal TSH on file in past 12 months     Recent Labs   Lab Test 04/07/21  1143   TSH 2.61              Passed - Patient is 12 years or older        Passed - Recent (12 mo) or future (30 days) visit within the authorizing provider's specialty     Patient has had an office visit with the authorizing provider or a provider within the authorizing providers department within the previous 12 mos or has a future within next 30 days. See \"Patient Info\" tab in inbasket, or \"Choose Columns\" in Meds & Orders section of the refill encounter.              Passed - Medication is active on med list        Passed - No active pregnancy on record     If patient is pregnant or has had a positive pregnancy test, please check TSH.          Passed - No positive pregnancy test in past 12 months     If patient is pregnant or has had a positive pregnancy test, please check TSH.               Benita Forte RN 08/16/22 3:35 PM  "

## 2022-08-17 ENCOUNTER — TELEPHONE (OUTPATIENT)
Dept: FAMILY MEDICINE | Facility: CLINIC | Age: 42
End: 2022-08-17

## 2022-08-17 RX ORDER — LEVOTHYROXINE SODIUM 75 UG/1
TABLET ORAL
Qty: 90 TABLET | Refills: 3 | Status: SHIPPED | OUTPATIENT
Start: 2022-08-17 | End: 2022-09-07

## 2022-08-17 NOTE — TELEPHONE ENCOUNTER
Medication Question or Refill        What medication are you calling about (include dose and sig)?: Levothyroxine -- pt is out of this medication    Controlled Substance Agreement on file:   CSA -- Patient Level:    CSA: None found at the patient level.       Who prescribed the medication?: Sunshine Biband    Do you need a refill? Yes:     When did you use the medication last? n/a    Patient offered an appointment? No    Do you have any questions or concerns?  No    Preferred Pharmacy:   [id number] - [place], [place] - [address] AT [facility]  [address]  [address] [place] [address]  Phone: [phone] Fax: [phone]      Could we send this information to you in Point or would you prefer to receive a phone call?:   Patient would prefer a phone call   Okay to leave a detailed message?: Yes at Home number on file [phone] (home)

## 2022-08-17 NOTE — TELEPHONE ENCOUNTER
General Call      Reason for Call:  Pt has a telehealth visit with Dr Kennedy and would like to have him as her new Primary    Please advise    What are your questions or concerns:  n/a    Date of last appointment with provider: n/a    Could we send this information to you in Paperlinks or would you prefer to receive a phone call?:   Patient would prefer a phone call   Okay to leave a detailed message?: Yes at Home number on file [phone] (home)

## 2022-08-19 ENCOUNTER — OFFICE VISIT (OUTPATIENT)
Dept: DERMATOLOGY | Facility: CLINIC | Age: 42
End: 2022-08-19
Payer: COMMERCIAL

## 2022-08-19 DIAGNOSIS — D48.5 NEOPLASM OF UNCERTAIN BEHAVIOR OF SKIN: ICD-10-CM

## 2022-08-19 DIAGNOSIS — L57.0 ACTINIC KERATOSIS: Primary | ICD-10-CM

## 2022-08-19 PROCEDURE — 88341 IMHCHEM/IMCYTCHM EA ADD ANTB: CPT | Performed by: PATHOLOGY

## 2022-08-19 PROCEDURE — 11102 TANGNTL BX SKIN SINGLE LES: CPT | Performed by: PHYSICIAN ASSISTANT

## 2022-08-19 PROCEDURE — 88305 TISSUE EXAM BY PATHOLOGIST: CPT | Performed by: PATHOLOGY

## 2022-08-19 PROCEDURE — 99202 OFFICE O/P NEW SF 15 MIN: CPT | Mod: 25 | Performed by: PHYSICIAN ASSISTANT

## 2022-08-19 PROCEDURE — 88342 IMHCHEM/IMCYTCHM 1ST ANTB: CPT | Performed by: PATHOLOGY

## 2022-08-19 PROCEDURE — 17000 DESTRUCT PREMALG LESION: CPT | Mod: 59 | Performed by: PHYSICIAN ASSISTANT

## 2022-08-19 ASSESSMENT — PAIN SCALES - GENERAL: PAINLEVEL: NO PAIN (0)

## 2022-08-19 NOTE — PATIENT INSTRUCTIONS
Wound Care Instructions     FOR SUPERFICIAL WOUNDS     [facility]  [phone]                 AFTER 24 HOURS YOU SHOULD REMOVE THE BANDAGE AND BEGIN DAILY DRESSING CHANGES AS FOLLOWS:     1) Remove Dressing.     2) Clean and dry the area with tap water using a Q-tip or sterile gauze pad.     3) Apply Vaseline, Aquaphor, Polysporin ointment or Bacitracin ointment over entire wound.  Do NOT use Neosporin ointment.     4) Cover the wound with a band-aid, or a sterile non-stick gauze pad and micropore paper tape    REPEAT THESE INSTRUCTIONS AT LEAST ONCE A DAY UNTIL THE WOUND HAS COMPLETELY HEALED.    It is an old wives tale that a wound heals better when it is exposed to air and allowed to dry out. The wound will heal faster with a better cosmetic result if it is kept moist with ointment and covered with a bandage.    **Do not let the wound dry out.**    Supplies Needed:      *Cotton tipped applicators (Q-tips)    *Vaseline, Aquaphor, Polysporin or Bacitracin Ointment (NOT NEOSPORIN)    *Band-aids or non-stick gauze pads and micropore paper tape.      PATIENT INFORMATION:    During the healing process you will notice a number of changes. All wounds develop a small halo of redness surrounding the wound.  This means healing is occurring. Severe itching with extensive redness usually indicates sensitivity to the ointment or bandage tape used to dress the wound.  You should call our office if this develops.      Swelling  and/or discoloration around your surgical site is common, particularly when performed around the eye.    All wounds normally drain.  The larger the wound the more drainage there will be.  After 7-10 days, you will notice the wound beginning to shrink and new skin will begin to grow.  The wound is healed when you can see skin has formed over the entire area.  A healed wound has a healthy, shiny look to the surface and is red to dark pink in color to normalize.  Wounds may  take approximately 4-6 weeks to heal.  Larger wounds may take 6-8 weeks.  After the wound is healed you may discontinue dressing changes.    You may experience a sensation of tightness as your wound heals. This is normal and will gradually subside.    Your healed wound may be sensitive to temperature changes. This sensitivity improves with time, but if you re having a lot of discomfort, try to avoid temperature extremes.    Patients frequently experience itching after their wound appears to have healed because of the continue healing under the skin.  Plain Vaseline will help relieve the itching.      POSSIBLE COMPLICATIONS    BLEEDING:    Leave the bandage in place.  Use tightly rolled up gauze or a cloth to apply direct pressure over the bandage for 30  minutes.  Reapply pressure for an additional 30 minutes if necessary  Use additional gauze and tape to maintain pressure once the bleeding has stopped.

## 2022-08-19 NOTE — PROGRESS NOTES
HPI:   Chief complaints: Dinorah Mai is a pleasant 42 year old female who presents for evaluation of two spots of concern. She has a scaly spot on the nose which has been present for about 8 months. It is not painful. She also has a new mole on the left thigh which appeared 1 year ago and has been changing. No history of skin cancer      PHYSICAL EXAM:    Breastfeeding No   Skin exam performed as follows: Type 2 skin. Mood appropriate  Alert and Oriented X 3. Well developed, well nourished in no distress.  General appearance: Normal  Head including face: Normal  Eyes: conjunctiva and lids: Normal  Mouth: Lips, teeth, gums: Normal  Neck: Normal  Cardiovascular: Exam of peripheral vascular system by observation for swelling, varicosities, edema: Normal  Right upper extremity: Normal  Left upper extremity: Normal  Right lower extremity: Normal  Left lower extremity: Normal  Skin: Scalp and body hair: See below    Pink gritty papule on the mid nasal tip x 1  3 mm brown macule with central dark brown dot on the left upper thigh     ASSESSMENT/PLAN:     1. Actinic keratosis on the mid nasal tip x 1. As precancerous, cryosurgery performed. Advised on blistering and post-op care. Advised if not resolved in 1-2 months to return for evaluation  2. Atypical nevus on the left upper thigh. Shave biopsy performed.  Area cleaned and anesthetized with 1% lidocaine with epinephrine.  Dermablade used to remove the lesion and sent to pathology. Bleeding was cauterized. Pt tolerated procedure well with no complications.             Follow-up: pending path  CC:   Scribed By: Haylie Reddy, MS, TRICIA

## 2022-08-19 NOTE — LETTER
8/19/2022         RE: Dinorah Mai  [address]  [place] [address]        Dear Colleague,    Thank you for referring your patient, Dinorah Mai, to the [facility]. Please see a copy of my visit note below.    HPI:   Chief complaints: Dinorah Mai is a pleasant 42 year old female who presents for evaluation of two spots of concern. She has a scaly spot on the nose which has been present for about 8 months. It is not painful. She also has a new mole on the left thigh which appeared 1 year ago and has been changing. No history of skin cancer      PHYSICAL EXAM:    Breastfeeding No   Skin exam performed as follows: Type 2 skin. Mood appropriate  Alert and Oriented X 3. Well developed, well nourished in no distress.  General appearance: Normal  Head including face: Normal  Eyes: conjunctiva and lids: Normal  Mouth: Lips, teeth, gums: Normal  Neck: Normal  Cardiovascular: Exam of peripheral vascular system by observation for swelling, varicosities, edema: Normal  Right upper extremity: Normal  Left upper extremity: Normal  Right lower extremity: Normal  Left lower extremity: Normal  Skin: Scalp and body hair: See below    Pink gritty papule on the mid nasal tip x 1  3 mm brown macule with central dark brown dot on the left upper thigh     ASSESSMENT/PLAN:     1. Actinic keratosis on the mid nasal tip x 1. As precancerous, cryosurgery performed. Advised on blistering and post-op care. Advised if not resolved in 1-2 months to return for evaluation  2. Atypical nevus on the left upper thigh. Shave biopsy performed.  Area cleaned and anesthetized with 1% lidocaine with epinephrine.  Dermablade used to remove the lesion and sent to pathology. Bleeding was cauterized. Pt tolerated procedure well with no complications.             Follow-up: pending path  CC:   Scribed By: Haylie Reddy, MS, PA-C          Again, thank you for allowing me to participate in the care of your  patient.        Sincerely,        Haylie Callahan PA-C

## 2022-08-25 LAB
PATH REPORT.COMMENTS IMP SPEC: NORMAL
PATH REPORT.FINAL DX SPEC: NORMAL
PATH REPORT.GROSS SPEC: NORMAL
PATH REPORT.MICROSCOPIC SPEC OTHER STN: NORMAL
PATH REPORT.RELEVANT HX SPEC: NORMAL

## 2022-08-26 ENCOUNTER — TELEPHONE (OUTPATIENT)
Dept: DERMATOLOGY | Facility: CLINIC | Age: 42
End: 2022-08-26

## 2022-08-26 NOTE — TELEPHONE ENCOUNTER
----- Message from Haylie Reddy PA-C sent at 8/26/2022  1:05 PM CDT -----  Left upper thigh severely dysplastic nevus please schedule re-excision within the next 4-6 weeks

## 2022-08-26 NOTE — TELEPHONE ENCOUNTER
Left message telling patient her derm appointment time is okay per provider. If she has any questions she can call us at [phone]     Nuria ROTHMAN RN  [facility]  [phone]

## 2022-08-26 NOTE — TELEPHONE ENCOUNTER
Patient returned my call. She states she reviewed the results. Patient expressed understanding.     Soonest available time for an excision was 10/17/22 at 3:30 PM at  Derm with Dr. Puentes which is 7 1/2 weeks out.  Is this okay to wait this long?      She is scheduled for this time as it was the soonest, but if she needs to be seen sooner we will need to find another time.   Patient would like a call back to confirm it is ok to wait until 10/17 or not.     I also scheduled for a full body skin exam on 9/22/22 at  Derm per patient request.     Please advise. Thank you     Nuria ROTHMAN RN  [facility]  [phone]

## 2022-08-26 NOTE — TELEPHONE ENCOUNTER
Left a voicemail asking patient to give us a call back at [phone] I let them know this line does not have a voicemail so if we do not answer they can try calling us back at a different time.     Also gave main line at [phone] in case she calls us after hours.     Nuria ROTHMAN RN  [facility]  [phone]

## 2022-09-07 ENCOUNTER — OFFICE VISIT (OUTPATIENT)
Dept: ENDOCRINOLOGY | Facility: CLINIC | Age: 42
End: 2022-09-07
Payer: COMMERCIAL

## 2022-09-07 VITALS
BODY MASS INDEX: 23.86 KG/M2 | HEART RATE: 77 BPM | WEIGHT: 133 LBS | DIASTOLIC BLOOD PRESSURE: 72 MMHG | OXYGEN SATURATION: 99 % | SYSTOLIC BLOOD PRESSURE: 103 MMHG

## 2022-09-07 DIAGNOSIS — E03.9 HYPOTHYROIDISM, UNSPECIFIED TYPE: ICD-10-CM

## 2022-09-07 DIAGNOSIS — E06.3 HYPOTHYROIDISM DUE TO HASHIMOTO'S THYROIDITIS: Primary | ICD-10-CM

## 2022-09-07 PROCEDURE — 99204 OFFICE O/P NEW MOD 45 MIN: CPT | Performed by: INTERNAL MEDICINE

## 2022-09-07 RX ORDER — LEVOTHYROXINE SODIUM 75 UG/1
75 TABLET ORAL DAILY
Qty: 90 TABLET | Refills: 3 | Status: SHIPPED | OUTPATIENT
Start: 2022-09-07 | End: 2023-01-31

## 2022-09-07 ASSESSMENT — HEADACHE IMPACT TEST (HIT 6)
HOW OFTEN HAVE YOU FELT FED UP OR IRRITATED BECAUSE OF YOUR HEADACHES: VERY OFTEN
HOW OFTEN DO HEADACHES LIMIT YOUR DAILY ACTIVITIES: VERY OFTEN
HOW OFTEN HAVE YOU FELT TOO TIRED TO WORK BECAUSE OF YOUR HEADACHES: VERY OFTEN
HOW OFTEN DID HEADACHS LIMIT CONCENTRATION ON WORK OR DAILY ACTIVITY: VERY OFTEN
WHEN YOU HAVE A HEADACHE HOW OFTEN DO YOU WISH YOU COULD LIE DOWN: ALWAYS
HIT6 TOTAL SCORE: 67
WHEN YOU HAVE HEADACHES HOW OFTEN IS THE PAIN SEVERE: SOMETIMES

## 2022-09-07 NOTE — PATIENT INSTRUCTIONS
For scheduling at [facility] ([facility], [facility], [place]), call [phone] or [phone]     For scheduling in the [place] ([facility], [facility]) call  [phone] or  [phone]        For scheduling in the South ([facility], [facility]) call [phone]  or   [phone]

## 2022-09-07 NOTE — PROGRESS NOTES
Dinorah Mai is a 42 year old yo female who presents today for evaluation of Hashimoto's Hypothyroidism, and thyroid nodule. She also has PMHx of chronic migraine, menorrhagia.     Chief Complaint   Patient presents with     New Patient     Thyroid Nodule       1) Hashimoto's Hypothyroidism  Diagnosis 10 years ago had delivered second daughter and was unable to nurse. Attributes this as the start of her thyroid issues. A few years later, was having, headaches, migraines, weight gain, hair loss, fatigue and checked thyroid function and found to have hypothyroidism. Diagnosed/treated in [place]. Endo there also completed ultrasound and noted solitary subcentimeter nodule that was monitored every few years  Treatment Levothyroxine 75mcg daily    Imaging thyroid ultrasound- last 5/2020, small subcentimeter nodule that per report was not visualized prior to this.    Thyroid ROS:  Notes fluctuating constipation (timed with menses), fatigue, frequent headaches (10+ days a month, worse around menses), small weight fluctuations  Has upcoming evaluation from neurologist in headache clinic  Denies other signs/symptoms of hypo/hyperthyroidism including hot/cold intolerance, diarrhea, major changes in hair skin or nails, tremor, palpations,       2) Solitary Thyroid Nodule  Seen on thyroid ultrasound 5/2020, not previously identified  No updates since then  Denies any difficulty breathing, difficulty swallowing, shortness of breath, enlargement of neck/thyroid, family history of thyroid cancer, exposure to radiation, hoarseness.      Active diagnoses this visit:     Hypothyroidism due to Hashimoto's thyroiditis  Hypothyroidism, unspecified type     ROS: 10 point ROS neg other than the symptoms noted above in the HPI.      Medical, surgical, social, and family histories, medications and allergies reviewed and updated.  Past Medical History:   Diagnosis Date     Hypothyroidism due to Hashimoto's thyroiditis        No past  surgical history on file.    Allergies:  Patient has no known allergies.    Social History     Tobacco Use     Smoking status: Never Smoker     Smokeless tobacco: Never Used   Substance Use Topics     Alcohol use: Yes     Comment: 2 drinks a week        Family History   Problem Relation Age of Onset     ALS Mother      No Known Problems Father          Objective:  /72 (BP Location: Left arm, Patient Position: Sitting, Cuff Size: Adult Regular)   Pulse 77   Wt 60.3 kg (133 lb)   SpO2 99%   BMI 23.86 kg/m      Exam:  Constitutional: healthy, alert, no acute distress  Head: Normocephalic. No masses, lesions, no exophthalmos/proptosis  ENT: normal thyroid, no palpable nodules, no cervical lymph nodes  Respiratory: nonlabored  Gastrointestinal: Abdomen soft, non-tender.  Musculoskeletal: extremities normal- no gross deformities noted, gait normal and normal muscle tone  Skin: no suspicious lesions or rashes  Neurologic: Gait normal. sensation grossly intact  Psychiatric: mentation appears normal, calm      Lab Results   Component Value Date/Time    TSH 2.61 04/07/2021 11:43 AM    ESTROGEN 54 04/07/2021 11:43 AM    FSH 5.1 04/07/2021 11:43 AM     Last Comprehensive Metabolic Panel:  Sodium   Date Value Ref Range Status   04/07/2021 141 136 - 145 mmol/L Final     Potassium   Date Value Ref Range Status   04/07/2021 4.3 3.5 - 5.0 mmol/L Final     Chloride   Date Value Ref Range Status   04/07/2021 104 98 - 107 mmol/L Final     Carbon Dioxide (CO2)   Date Value Ref Range Status   04/07/2021 26 22 - 31 mmol/L Final     Anion Gap   Date Value Ref Range Status   04/07/2021 11 5 - 18 mmol/L Final     Glucose   Date Value Ref Range Status   04/07/2021 92 70 - 125 mg/dL Final     Urea Nitrogen   Date Value Ref Range Status   04/07/2021 11 8 - 22 mg/dL Final     Creatinine   Date Value Ref Range Status   04/07/2021 0.77 0.60 - 1.10 mg/dL Final     GFR Estimate   Date Value Ref Range Status   04/07/2021 >60 >60  mL/min/1.73m2 Final     Calcium   Date Value Ref Range Status   04/07/2021 9.6 8.5 - 10.5 mg/dL Final       Thyroid ultrasound 5/27/2020:      ASSESSMENT / PLAN:      1) Hashimoto's Thyroiditis  - TFTs normal 4/2021  - Recheck TSH, T4, TT3 at this ttime  - Continue current dose levothyroxine for now, refill given  - Recheck TPO to confirm diagnosis of hashimoto's     2) Solitary Thyroid Nodule  - Not previously visualized, unable to review images myself  - Recheck thyroid ultrasound now to confirm stability          Orders Placed This Encounter   Procedures     US Thyroid     T4 free     TSH     Thyroid peroxidase antibody     T3, total         RTC 1 year    A total of 30 minutes were spent today 09/07/22 on this visit including documentation, chart review with greater than 50% of time spent on direct counseling.    Answers for HPI/ROS submitted by the patient on 9/4/2022  General Symptoms: No  Skin Symptoms: No  HENT Symptoms: No  EYE SYMPTOMS: No  HEART SYMPTOMS: No  LUNG SYMPTOMS: No  INTESTINAL SYMPTOMS: No  URINARY SYMPTOMS: No  GYNECOLOGIC SYMPTOMS: No  BREAST SYMPTOMS: No  SKELETAL SYMPTOMS: No  BLOOD SYMPTOMS: No  NERVOUS SYSTEM SYMPTOMS: No  MENTAL HEALTH SYMPTOMS: No

## 2022-09-07 NOTE — NURSING NOTE
Chief Complaint   Patient presents with     New Patient     Thyroid Nodule       Vitals:    09/07/22 1334   BP: 103/72   BP Location: Left arm   Patient Position: Sitting   Cuff Size: Adult Regular   Pulse: 77   SpO2: 99%   Weight: 60.3 kg (133 lb)       Body mass index is 23.86 kg/m .        ELIZABETH Crowe

## 2022-09-07 NOTE — LETTER
9/7/2022         RE: Dinorah Mai  [address]  [place] [address]        Dear Colleague,    Thank you for referring your patient, Dinorah Mai, to the [facility] [place]. Please see a copy of my visit note below.    Dinorah Mai is a 42 year old yo female who presents today for evaluation of Hashimoto's Hypothyroidism, and thyroid nodule. She also has PMHx of chronic migraine, menorrhagia.     Chief Complaint   Patient presents with     New Patient     Thyroid Nodule       1) Hashimoto's Hypothyroidism  Diagnosis 10 years ago had delivered second daughter and was unable to nurse. Attributes this as the start of her thyroid issues. A few years later, was having, headaches, migraines, weight gain, hair loss, fatigue and checked thyroid function and found to have hypothyroidism. Diagnosed/treated in [place]. Endo there also completed ultrasound and noted solitary subcentimeter nodule that was monitored every few years  Treatment Levothyroxine 75mcg daily    Imaging thyroid ultrasound- last 5/2020, small subcentimeter nodule that per report was not visualized prior to this.    Thyroid ROS:  Notes fluctuating constipation (timed with menses), fatigue, frequent headaches (10+ days a month, worse around menses), small weight fluctuations  Has upcoming evaluation from neurologist in headache clinic  Denies other signs/symptoms of hypo/hyperthyroidism including hot/cold intolerance, diarrhea, major changes in hair skin or nails, tremor, palpations,       2) Solitary Thyroid Nodule  Seen on thyroid ultrasound 5/2020, not previously identified  No updates since then  Denies any difficulty breathing, difficulty swallowing, shortness of breath, enlargement of neck/thyroid, family history of thyroid cancer, exposure to radiation, hoarseness.      Active diagnoses this visit:     Hypothyroidism due to Hashimoto's thyroiditis  Hypothyroidism, unspecified type     ROS: 10 point ROS  neg other than the symptoms noted above in the HPI.      Medical, surgical, social, and family histories, medications and allergies reviewed and updated.  Past Medical History:   Diagnosis Date     Hypothyroidism due to Hashimoto's thyroiditis        No past surgical history on file.    Allergies:  Patient has no known allergies.    Social History     Tobacco Use     Smoking status: Never Smoker     Smokeless tobacco: Never Used   Substance Use Topics     Alcohol use: Yes     Comment: 2 drinks a week        Family History   Problem Relation Age of Onset     ALS Mother      No Known Problems Father          Objective:  /72 (BP Location: Left arm, Patient Position: Sitting, Cuff Size: Adult Regular)   Pulse 77   Wt 60.3 kg (133 lb)   SpO2 99%   BMI 23.86 kg/m      Exam:  Constitutional: healthy, alert, no acute distress  Head: Normocephalic. No masses, lesions, no exophthalmos/proptosis  ENT: normal thyroid, no palpable nodules, no cervical lymph nodes  Respiratory: nonlabored  Gastrointestinal: Abdomen soft, non-tender.  Musculoskeletal: extremities normal- no gross deformities noted, gait normal and normal muscle tone  Skin: no suspicious lesions or rashes  Neurologic: Gait normal. sensation grossly intact  Psychiatric: mentation appears normal, calm      Lab Results   Component Value Date/Time    TSH 2.61 04/07/2021 11:43 AM    ESTROGEN 54 04/07/2021 11:43 AM    FSH 5.1 04/07/2021 11:43 AM     Last Comprehensive Metabolic Panel:  Sodium   Date Value Ref Range Status   04/07/2021 141 136 - 145 mmol/L Final     Potassium   Date Value Ref Range Status   04/07/2021 4.3 3.5 - 5.0 mmol/L Final     Chloride   Date Value Ref Range Status   04/07/2021 104 98 - 107 mmol/L Final     Carbon Dioxide (CO2)   Date Value Ref Range Status   04/07/2021 26 22 - 31 mmol/L Final     Anion Gap   Date Value Ref Range Status   04/07/2021 11 5 - 18 mmol/L Final     Glucose   Date Value Ref Range Status   04/07/2021 92 70 - 125  mg/dL Final     Urea Nitrogen   Date Value Ref Range Status   04/07/2021 11 8 - 22 mg/dL Final     Creatinine   Date Value Ref Range Status   04/07/2021 0.77 0.60 - 1.10 mg/dL Final     GFR Estimate   Date Value Ref Range Status   04/07/2021 >60 >60 mL/min/1.73m2 Final     Calcium   Date Value Ref Range Status   04/07/2021 9.6 8.5 - 10.5 mg/dL Final       Thyroid ultrasound 5/27/2020:      ASSESSMENT / PLAN:      1) Hashimoto's Thyroiditis  - TFTs normal 4/2021  - Recheck TSH, T4, TT3 at this ttime  - Continue current dose levothyroxine for now, refill given  - Recheck TPO to confirm diagnosis of hashimoto's     2) Solitary Thyroid Nodule  - Not previously visualized, unable to review images myself  - Recheck thyroid ultrasound now to confirm stability          Orders Placed This Encounter   Procedures     US Thyroid     T4 free     TSH     Thyroid peroxidase antibody     T3, total         RTC 1 year    A total of 30 minutes were spent today 09/07/22 on this visit including documentation, chart review with greater than 50% of time spent on direct counseling.    Answers for HPI/ROS submitted by the patient on 9/4/2022  General Symptoms: No  Skin Symptoms: No  HENT Symptoms: No  EYE SYMPTOMS: No  HEART SYMPTOMS: No  LUNG SYMPTOMS: No  INTESTINAL SYMPTOMS: No  URINARY SYMPTOMS: No  GYNECOLOGIC SYMPTOMS: No  BREAST SYMPTOMS: No  SKELETAL SYMPTOMS: No  BLOOD SYMPTOMS: No  NERVOUS SYSTEM SYMPTOMS: No  MENTAL HEALTH SYMPTOMS: No          Again, thank you for allowing me to participate in the care of your patient.        Sincerely,        Haylie Her MD

## 2022-09-13 ENCOUNTER — ANCILLARY PROCEDURE (OUTPATIENT)
Dept: ULTRASOUND IMAGING | Facility: CLINIC | Age: 42
End: 2022-09-13
Attending: INTERNAL MEDICINE
Payer: COMMERCIAL

## 2022-09-13 DIAGNOSIS — N92.0 MENORRHAGIA WITH REGULAR CYCLE: ICD-10-CM

## 2022-09-13 DIAGNOSIS — E06.3 HYPOTHYROIDISM DUE TO HASHIMOTO'S THYROIDITIS: ICD-10-CM

## 2022-09-13 PROCEDURE — 76536 US EXAM OF HEAD AND NECK: CPT | Performed by: RADIOLOGY

## 2022-09-13 RX ORDER — TRANEXAMIC ACID 650 MG/1
TABLET ORAL
Qty: 15 TABLET | Refills: 3 | Status: SHIPPED | OUTPATIENT
Start: 2022-09-13 | End: 2023-01-30

## 2022-09-13 NOTE — TELEPHONE ENCOUNTER
Routing refill request to provider for review/approval because:  Drug not on the [facility] refill protocol     Last Written Prescription Date:  6/3/22  Last Fill Quantity: 15  # refills: 3   Last office visit provider:  5/23/22     Requested Prescriptions   Pending Prescriptions Disp Refills     tranexamic acid (LYSTEDA) 650 MG tablet [Pharmacy Med Name: TRANEXAMIC ACID 650MG TABLETS] 15 tablet 3     Sig: TAKE 2 TABLET BY MOUTH THREE TIMES DAILY FOR UP TO FIVE DAYS DURING MONTHLY MENSTRUATION       There is no refill protocol information for this order          CASSIDY RHODES RN 09/13/22 8:54 AM

## 2022-09-14 ENCOUNTER — PRE VISIT (OUTPATIENT)
Dept: NEUROLOGY | Facility: CLINIC | Age: 42
End: 2022-09-14

## 2022-09-14 ENCOUNTER — OFFICE VISIT (OUTPATIENT)
Dept: NEUROLOGY | Facility: CLINIC | Age: 42
End: 2022-09-14
Attending: INTERNAL MEDICINE
Payer: COMMERCIAL

## 2022-09-14 VITALS
OXYGEN SATURATION: 100 % | BODY MASS INDEX: 23.5 KG/M2 | HEART RATE: 94 BPM | WEIGHT: 131 LBS | DIASTOLIC BLOOD PRESSURE: 70 MMHG | SYSTOLIC BLOOD PRESSURE: 104 MMHG | RESPIRATION RATE: 16 BRPM

## 2022-09-14 DIAGNOSIS — G43.709 CHRONIC MIGRAINE WITHOUT AURA WITHOUT STATUS MIGRAINOSUS, NOT INTRACTABLE: ICD-10-CM

## 2022-09-14 PROCEDURE — 99205 OFFICE O/P NEW HI 60 MIN: CPT | Performed by: NURSE PRACTITIONER

## 2022-09-14 RX ORDER — TOPIRAMATE 25 MG/1
TABLET, FILM COATED ORAL
Qty: 120 TABLET | Refills: 6 | Status: SHIPPED | OUTPATIENT
Start: 2022-09-14 | End: 2023-01-23

## 2022-09-14 RX ORDER — NARATRIPTAN 2.5 MG/1
2.5 TABLET ORAL
Qty: 18 TABLET | Refills: 9 | Status: SHIPPED | OUTPATIENT
Start: 2022-09-14 | End: 2023-10-13

## 2022-09-14 RX ORDER — ASPIRIN 81 MG/1
81 TABLET, CHEWABLE ORAL DAILY
COMMUNITY
End: 2023-01-30

## 2022-09-14 ASSESSMENT — PAIN SCALES - GENERAL: PAINLEVEL: MILD PAIN (3)

## 2022-09-14 NOTE — LETTER
9/14/2022       RE: Dinorah Mai  [address]  [place] [address]     Dear Colleague,    Thank you for referring your patient, Dinorah Mai, to the [facility] [place] at [facility]. Please see a copy of my visit note below.    ASSESSMENT AND PLAN:  Headaches and reported headache symptoms appear to be consistent with chronic migraine symptoms. Possible hypnic headaches for about 20 years.  Fx of headaches so possible genetic in ethiology. No new pattern changes and appear to be around menstrual cycle.   Will wait with any further headache work up.   More aggressive headache prevention -options reviewed with the patient and may try to adjust either topiramate or amitriptyline may be looking into Botox -preferred due to headache pattern  or CGRPs next step   Increase topiramate as instructed up to 100 mg daily in divided dose ,  stay hydrated and drink at least 10+glasses of water to decrease risk of kidney stones, may cause tingling in the hands and feet, taste changes, glaucoma, nausea, weight stable or loss, mood changes, depression, rash    Rescue treatment -a trial of naratriptan as needed and limit use to no more than 9 days per month. Ok to take it with naproxen 1-2 tabs every 12 hours as needed. Do not take naproxen the same time with other NSAIDs such as ibuprofen, aspirin   Stay hydrated   Limit caffeine to no more than one cup per day and limit any other caffeine containing beverages.   Follow up in 3 months or sooner if needed via MyChart     Subjective   Dinorah is a 42 year old  presenting for the following health issues:  Consult (NEW HEADACHE)    HPI   Migraine for 20 years. Visual aura once but never since than. Migraines and regular headaches -in general 1-2 weeks per month and total more than 15 headache days per month. Duration -until goes to sleep and would not go away untreated, about 8 hours.  A week before  menstrual cycle and during cycle and a few days after the cycle. Randomly due to other factors.   Saw IM and was started on topiramate 50 mg at bedtime , amitriptyline 10 mg at bedtime , folic acid and ASA 81 mg -felt 25% better -started 3/29/2022  Sometimes gets nocturnal headaches   Loc-at the base of the neck and progresses upwards. Pain is throbbing and moderate in intensity, gets worse with activities, slow progression. Associated light and noise sensitivity, no nausea or vomiting, hard to focus and fuzzy feeling.   Random months for many years the same pattern headache - about 1 am would be awake -pounding headache, sweating, racing heart in the back of the head. Takes Excedrin and helps in 15 minutes No autonomic features. No nausea or vomiting,   FH-mother and grandmother both had headaches.   Rescue treatment rizatriptan, sumatriptan-some relief but not a complete   Not on any Birth control -made headaches worse in the past    Brain MRI within last 10 years -did not find anything     SH:  Works at [employer]  Has 3 kids       Objective    /70   Pulse 94   Resp 16   Wt 59.4 kg (131 lb)   SpO2 100%   BMI 23.50 kg/m    Body mass index is 23.5 kg/m .  Physical Exam   GENERAL: healthy, alert and no distress  EYES: Eyes grossly normal to inspection, PERRL and conjunctivae and sclerae normal  NECK: no adenopathy, no asymmetry, masses, or scars and thyroid normal to palpation  RESP: lungs clear to auscultation - no rales, rhonchi or wheezes  CV: regular rate and rhythm, no murmur, click or rub, no peripheral edema and peripheral pulses strong  MS: no gross musculoskeletal defects noted, no edema  NEURO: Normal strength and tone, sensory exam grossly normal, mentation intact, speech normal, cranial nerves 2-12 intact, gait normal including heel/toe/tandem walking and Romberg normal  PSYCH: mentation appears normal, affect normal    I discussed all my recommendations with Dinorah Mai  who verbalizes understanding and comfortable with the plan.  All of patient's questions were answered from the best of my knowledge.  Patient is in agreement with the plan.     56 minutes spent on the date of the encounter doing video access, chart  review, exam, results review,  meds review, treatment plan, documentation and further activities as noted above    HUGH Perez, CNP [facility]  Headache certified  [facility]

## 2022-09-14 NOTE — PROGRESS NOTES
ASSESSMENT AND PLAN:  Headaches and reported headache symptoms appear to be consistent with chronic migraine symptoms. Possible hypnic headaches for about 20 years.  Fx of headaches so possible genetic in ethiology. No new pattern changes and appear to be around menstrual cycle.   Will wait with any further headache work up.   More aggressive headache prevention -options reviewed with the patient and may try to adjust either topiramate or amitriptyline may be looking into Botox -preferred due to headache pattern  or CGRPs next step   Increase topiramate as instructed up to 100 mg daily in divided dose ,  stay hydrated and drink at least 10+glasses of water to decrease risk of kidney stones, may cause tingling in the hands and feet, taste changes, glaucoma, nausea, weight stable or loss, mood changes, depression, rash    Rescue treatment -a trial of naratriptan as needed and limit use to no more than 9 days per month. Ok to take it with naproxen 1-2 tabs every 12 hours as needed. Do not take naproxen the same time with other NSAIDs such as ibuprofen, aspirin   Stay hydrated   Limit caffeine to no more than one cup per day and limit any other caffeine containing beverages.   Follow up in 3 months or sooner if needed via MyChart     Subjective   Dinorah is a 42 year old  presenting for the following health issues:  Consult (NEW HEADACHE)    HPI   Migraine for 20 years. Visual aura once but never since than. Migraines and regular headaches -in general 1-2 weeks per month and total more than 15 headache days per month. Duration -until goes to sleep and would not go away untreated, about 8 hours.  A week before menstrual cycle and during cycle and a few days after the cycle. Randomly due to other factors.   Saw IM and was started on topiramate 50 mg at bedtime , amitriptyline 10 mg at bedtime , folic acid and ASA 81 mg -felt 25% better -started 3/29/2022  Sometimes gets nocturnal headaches   Loc-at the base of the neck and  progresses upwards. Pain is throbbing and moderate in intensity, gets worse with activities, slow progression. Associated light and noise sensitivity, no nausea or vomiting, hard to focus and fuzzy feeling.   Random months for many years the same pattern headache - about 1 am would be awake -pounding headache, sweating, racing heart in the back of the head. Takes Excedrin and helps in 15 minutes No autonomic features. No nausea or vomiting,   FH-mother and grandmother both had headaches.   Rescue treatment rizatriptan, sumatriptan-some relief but not a complete   Not on any Birth control -made headaches worse in the past    Brain MRI within last 10 years -did not find anything     SH:  Works at [employer]  Has 3 kids       Objective    /70   Pulse 94   Resp 16   Wt 59.4 kg (131 lb)   SpO2 100%   BMI 23.50 kg/m    Body mass index is 23.5 kg/m .  Physical Exam   GENERAL: healthy, alert and no distress  EYES: Eyes grossly normal to inspection, PERRL and conjunctivae and sclerae normal  NECK: no adenopathy, no asymmetry, masses, or scars and thyroid normal to palpation  RESP: lungs clear to auscultation - no rales, rhonchi or wheezes  CV: regular rate and rhythm, no murmur, click or rub, no peripheral edema and peripheral pulses strong  MS: no gross musculoskeletal defects noted, no edema  NEURO: Normal strength and tone, sensory exam grossly normal, mentation intact, speech normal, cranial nerves 2-12 intact, gait normal including heel/toe/tandem walking and Romberg normal  PSYCH: mentation appears normal, affect normal    I discussed all my recommendations with Dinorah Mai who verbalizes understanding and comfortable with the plan.  All of patient's questions were answered from the best of my knowledge.  Patient is in agreement with the plan.     56 minutes spent on the date of the encounter doing video access, chart  review, exam, results review,  meds review, treatment plan,  documentation and further activities as noted above    HUHG Perez, CNP [facility]  Headache certified  [facility]

## 2022-09-14 NOTE — PATIENT INSTRUCTIONS
More aggressive headache prevention -options reviewed with the patient and may try to adjust either topiramate or amitriptyline may be looking into Botox or CGRPs next step   Increase topiramate as instructed up to 100 mg daily in divided dose ,  stay hydrated and drink at least 10+glasses of water to decrease risk of kidney stones, may cause tingling in the hands and feet, taste changes, glaucoma, nausea, weight stable or loss, mood changes, depression, rash    Rescue treatment -a trial of naratriptan as needed and limit use to no more than 9 days per month. Ok to take it with naproxen 1-2 tabs every 12 hours as needed. Do not take naproxen the same time with other NSAIDs such as ibuprofen, aspirin   Stay hydrated   Limit caffeine to no more than one cup per day and limit any other caffeine containing beverages.   Follow up in 3 months or sooner if needed via [facility]

## 2022-09-16 NOTE — RESULT ENCOUNTER NOTE
Dear Dinorah,     Here are your recent results. Nodule in 2017 measured 5x4mm (only measurements they gave)-- so this is relatively stable/mildly increased since then. I'd advocate reimaging in 2-3 years to establish continued stability, and then every 5 years after that.     Please let us know if you have any questions or concerns.    Regards,  Haylie Her MD

## 2022-09-20 ENCOUNTER — LAB (OUTPATIENT)
Dept: LAB | Facility: CLINIC | Age: 42
End: 2022-09-20
Payer: COMMERCIAL

## 2022-09-20 DIAGNOSIS — E06.3 HYPOTHYROIDISM DUE TO HASHIMOTO'S THYROIDITIS: ICD-10-CM

## 2022-09-20 LAB
T3 SERPL-MCNC: 77 NG/DL (ref 85–202)
T4 FREE SERPL-MCNC: 1.11 NG/DL (ref 0.76–1.46)
TSH SERPL DL<=0.005 MIU/L-ACNC: 2.23 MU/L (ref 0.4–4)

## 2022-09-20 PROCEDURE — 84443 ASSAY THYROID STIM HORMONE: CPT

## 2022-09-20 PROCEDURE — 84480 ASSAY TRIIODOTHYRONINE (T3): CPT

## 2022-09-20 PROCEDURE — 86376 MICROSOMAL ANTIBODY EACH: CPT

## 2022-09-20 PROCEDURE — 84439 ASSAY OF FREE THYROXINE: CPT

## 2022-09-20 PROCEDURE — 36415 COLL VENOUS BLD VENIPUNCTURE: CPT

## 2022-09-21 LAB — THYROPEROXIDASE AB SERPL-ACNC: 812 IU/ML

## 2022-09-22 ENCOUNTER — OFFICE VISIT (OUTPATIENT)
Dept: DERMATOLOGY | Facility: CLINIC | Age: 42
End: 2022-09-22
Payer: COMMERCIAL

## 2022-09-22 DIAGNOSIS — D22.9 MULTIPLE BENIGN NEVI: Primary | ICD-10-CM

## 2022-09-22 DIAGNOSIS — Z12.83 ENCOUNTER FOR SCREENING FOR MALIGNANT NEOPLASM OF SKIN: ICD-10-CM

## 2022-09-22 DIAGNOSIS — L81.4 LENTIGINES: ICD-10-CM

## 2022-09-22 DIAGNOSIS — D18.01 CHERRY ANGIOMA: ICD-10-CM

## 2022-09-22 PROCEDURE — 99213 OFFICE O/P EST LOW 20 MIN: CPT | Performed by: NURSE PRACTITIONER

## 2022-09-22 ASSESSMENT — PAIN SCALES - GENERAL: PAINLEVEL: NO PAIN (0)

## 2022-09-22 NOTE — PROGRESS NOTES
[facility] Dermatology Note  Encounter Date: Sep 22, 2022  Office Visit     Reviewed patients past medical history and pertinent chart review prior to patients visit today.     Dermatology Problem List:  Shave biopsy 8/19/22 from left upper thigh showed severely atypical melanocytic lesion with features verging on melanoma in situ.Wide excision scheduled for 10/17/22    History of actinic keratosis on the nasal tip treated with cryotherapy 8/19/2022    ____________________________________________    Assessment & Plan:     # history of DN borderline melanoma in situ. Will have wide excision 10/17/22.     # Benign skin findings including: cherry angioma, lentigines and benign nevi.   - No further intervention required. Patient to report changes.   - Patient reassured of the benign nature of these lesions.    #Signs and Symptoms of non-melanoma skin cancer and ABCDEs of melanoma reviewed with patient. Patient encouraged to perform monthly self skin exams and educated on how to perform them. UV precautions reviewed with patient. Patient was asked about new or changing moles/lesions on body.     #Reviewed Sunscreen: Apply 20 minutes prior to going outdoors and reapply every two hours, when wet or sweating. We recommend using an SPF 30 or higher, and to use one that is water resistant.       Follow-up:  6 months for follow up full body skin exam, prn for new or changing lesions or new concerns    Isabel Finney, APRN CNP on 9/22/2022 at 2:49 PM    ____________________________________________    CC: Skin Check    HPI:  Ms. Dinorah Mai is a(n) 42 year old female who presents today as a return patient for a full body skin cancer screening.Patient has no specific concerns today.     Patient is otherwise feeling well, without additional skin concerns.     Physical Exam:  Vitals: There were no vitals taken for this visit.  SKIN: Total skin excluding the genitalia areas was performed. The exam  included the head/face, neck, both arms, chest, back, abdomen, both legs, digits, mons pubis, buttock and nails.   -healing scar on left upper thigh. No pigment  -several 1-2mm red dome shaped symmetric papules scattered on the trunk  -multiple tan/brown flat round macules and raised papules scattered throughout trunk, extremities and head. No worrisome features for malignancy noted on examination.  -scattered tan, homogenous macules scattered on sun exposed areas of trunk, extremities and face.     - No other lesions of concern on areas examined.     Medications:  Current Outpatient Medications   Medication     amitriptyline (ELAVIL) 10 MG tablet     aspirin (ASA) 81 MG chewable tablet     cholecalciferol 25 MCG (1000 UT) TABS     ferrous sulfate (FEROSUL) 325 (65 Fe) MG tablet     folic acid (FOLVITE) 1 MG tablet     levothyroxine (SYNTHROID/LEVOTHROID) 75 MCG tablet     Magnesium Gluconate 550 MG TABS     naratriptan (AMERGE) 2.5 MG tablet     SUMAtriptan (IMITREX) 50 MG tablet     topiramate (TOPAMAX) 25 MG tablet     tranexamic acid (LYSTEDA) 650 MG tablet     vitamin B complex with vitamin C (VITAMIN  B COMPLEX) tablet     Current Facility-Administered Medications   Medication     [START ON 9/28/2022] Botulinum Toxin Type A (BOTOX) 200 units injection 155 Units      Past Medical History:   Patient Active Problem List   Diagnosis     PMDD (premenstrual dysphoric disorder)     Menorrhagia with regular cycle     Hypothyroidism due to Hashimoto's thyroiditis     Chronic migraine without aura without status migrainosus, not intractable     Past Medical History:   Diagnosis Date     Hypothyroidism due to Hashimoto's thyroiditis        CC No referring provider defined for this encounter. on close of this encounter.

## 2022-09-22 NOTE — LETTER
9/22/2022         RE: Dinorah Mai  [address]  [place] [address]        Dear Colleague,    Thank you for referring your patient, Dinorah Mai, to the [facility]. Please see a copy of my visit note below.    [facility] Dermatology Note  Encounter Date: Sep 22, 2022  Office Visit     Reviewed patients past medical history and pertinent chart review prior to patients visit today.     Dermatology Problem List:  Shave biopsy 8/19/22 from left upper thigh showed severely atypical melanocytic lesion with features verging on melanoma in situ.Wide excision scheduled for 10/17/22    History of actinic keratosis on the nasal tip treated with cryotherapy 8/19/2022    ____________________________________________    Assessment & Plan:     # history of DN borderline melanoma in situ. Will have wide excision 10/17/22.     # Benign skin findings including: cherry angioma, lentigines and benign nevi.   - No further intervention required. Patient to report changes.   - Patient reassured of the benign nature of these lesions.    #Signs and Symptoms of non-melanoma skin cancer and ABCDEs of melanoma reviewed with patient. Patient encouraged to perform monthly self skin exams and educated on how to perform them. UV precautions reviewed with patient. Patient was asked about new or changing moles/lesions on body.     #Reviewed Sunscreen: Apply 20 minutes prior to going outdoors and reapply every two hours, when wet or sweating. We recommend using an SPF 30 or higher, and to use one that is water resistant.       Follow-up:  6 months for follow up full body skin exam, prn for new or changing lesions or new concerns    Isabel Finney, APRN CNP on 9/22/2022 at 2:49 PM    ____________________________________________    CC: Skin Check    HPI:  Ms. Dinorah Mia is a(n) 42 year old female who presents today as a return patient for a full body skin cancer screening.Patient has  no specific concerns today.     Patient is otherwise feeling well, without additional skin concerns.     Physical Exam:  Vitals: There were no vitals taken for this visit.  SKIN: Total skin excluding the genitalia areas was performed. The exam included the head/face, neck, both arms, chest, back, abdomen, both legs, digits, mons pubis, buttock and nails.   -healing scar on left upper thigh. No pigment  -several 1-2mm red dome shaped symmetric papules scattered on the trunk  -multiple tan/brown flat round macules and raised papules scattered throughout trunk, extremities and head. No worrisome features for malignancy noted on examination.  -scattered tan, homogenous macules scattered on sun exposed areas of trunk, extremities and face.     - No other lesions of concern on areas examined.     Medications:  Current Outpatient Medications   Medication     amitriptyline (ELAVIL) 10 MG tablet     aspirin (ASA) 81 MG chewable tablet     cholecalciferol 25 MCG (1000 UT) TABS     ferrous sulfate (FEROSUL) 325 (65 Fe) MG tablet     folic acid (FOLVITE) 1 MG tablet     levothyroxine (SYNTHROID/LEVOTHROID) 75 MCG tablet     Magnesium Gluconate 550 MG TABS     naratriptan (AMERGE) 2.5 MG tablet     SUMAtriptan (IMITREX) 50 MG tablet     topiramate (TOPAMAX) 25 MG tablet     tranexamic acid (LYSTEDA) 650 MG tablet     vitamin B complex with vitamin C (VITAMIN  B COMPLEX) tablet     Current Facility-Administered Medications   Medication     [START ON 9/28/2022] Botulinum Toxin Type A (BOTOX) 200 units injection 155 Units      Past Medical History:   Patient Active Problem List   Diagnosis     PMDD (premenstrual dysphoric disorder)     Menorrhagia with regular cycle     Hypothyroidism due to Hashimoto's thyroiditis     Chronic migraine without aura without status migrainosus, not intractable     Past Medical History:   Diagnosis Date     Hypothyroidism due to Hashimoto's thyroiditis        CC No referring provider defined for this  encounter. on close of this encounter.      Again, thank you for allowing me to participate in the care of your patient.        Sincerely,        HUGH Franco CNP

## 2022-09-23 NOTE — RESULT ENCOUNTER NOTE
Dear Dinorah,     Here are your recent results. Elevated TPO confirms diagnosis of hashimoto's hypothyroidism. Your T3 level is very close to normal-- I would  not make any changes if you're feeling good. Let me know!    Please let us know if you have any questions or concerns.    Regards,  Haylie Her MD

## 2022-09-24 ENCOUNTER — HEALTH MAINTENANCE LETTER (OUTPATIENT)
Age: 42
End: 2022-09-24

## 2022-09-28 DIAGNOSIS — E06.3 HYPOTHYROIDISM DUE TO HASHIMOTO'S THYROIDITIS: Primary | ICD-10-CM

## 2022-09-28 RX ORDER — LIOTHYRONINE SODIUM 5 UG/1
5 TABLET ORAL DAILY
Qty: 30 TABLET | Refills: 4 | Status: SHIPPED | OUTPATIENT
Start: 2022-09-28 | End: 2022-09-29

## 2022-09-29 ENCOUNTER — OFFICE VISIT (OUTPATIENT)
Dept: NEUROLOGY | Facility: CLINIC | Age: 42
End: 2022-09-29
Payer: COMMERCIAL

## 2022-09-29 DIAGNOSIS — E06.3 HYPOTHYROIDISM DUE TO HASHIMOTO'S THYROIDITIS: ICD-10-CM

## 2022-09-29 DIAGNOSIS — G43.709 CHRONIC MIGRAINE WITHOUT AURA WITHOUT STATUS MIGRAINOSUS, NOT INTRACTABLE: Primary | ICD-10-CM

## 2022-09-29 PROCEDURE — 64615 CHEMODENERV MUSC MIGRAINE: CPT | Performed by: NURSE PRACTITIONER

## 2022-09-29 RX ORDER — LIOTHYRONINE SODIUM 5 UG/1
TABLET ORAL
Qty: 90 TABLET | Refills: 1 | Status: SHIPPED | OUTPATIENT
Start: 2022-09-29 | End: 2023-01-31

## 2022-09-29 NOTE — LETTER
9/29/2022       RE: Dinorah Mai  [address] [place] [address]     Dear Colleague,    Thank you for referring your patient, Dinorah Mai, to the [facility] [place] at [facility]. Please see a copy of my visit note below.      BOTULINUM NEUROTOXIN INJECTION PROCEDURE:  DATA:9/29/2022  INDICATIONS FOR PROCEDURES:   chronic migraine headaches.  baseline symptoms have been recalcitrant to oral medications and conservative therapy.  total more than 15 headache days per month  topiramate 50 mg at bedtime , amitriptyline  Patient is here today for an initial injection with Botox.    GOAL OF PROCEDURE:  The goal of this procedure is to decrease pain and enhance functional independence.    TOTAL DOSE ADMINISTERED:  Dose Administered:  155 units  Botox (Botulinum Toxin Type A)       2:1 Dilution    Diluent Used:  Preservative Free Normal Saline  Wasted 45 units  Medication guide was offered to patient     CONSENT:  The risks, benefits, and treatment options were discussed with the patient who agreed to proceed.    Written consent was obtained     EQUIPMENT USED:  Needles-30 gauge, 0.5 inches for injections  Four 1-ml tuberculin syringes for injections  One sodium chloride 10 ml vial preservative free  Alcohol swabs    SKIN PREPARATION:  Skin preparation was performed using an alcohol wipe.      AREA/MUSCLE INJECTED:  155 units of Botox    Right upper Trapezius (upper cervical) - 5 units of Botox at 3 site/s.   Left upper Trapezius (upper cervical) - 5 units of Botox at 3 site/s.     Right cervical paraspinals - 5 units of Botox at 2 site/s.   Left cervical paraspinals - 5 units of Botox at 2 site/s.     Left occipitalis - 5 units of Botox at 3 site/s.  Right occipitalis -5 units of Botox at 3 site/s    Right Frontalis - 5 units of Botox at 2 site/s.  Left Frontalis - 5 units of Botox at 2 site/s.    Right Temporalis - 5 units of Botox  at 4 site/s.  Left Temporalis - 5 units of Botox at 4 site/s.    Right  - 5 units of Botox at 1 site/s.              Left  - 5 units of Botox at 1 site/s.    Procerus - 5 units of Botox at 1 site/s.    RESPONSE TO PROCEDURE:  tolerated the procedure well and there were no immediate complications.  Patient was allowed to recover for an appropriate period of time and was discharged home in stable condition.    FOLLOW UP:    Repeat Botox injections in 12 weeks      This procedure was performed under a hospital privileging agreement with Dr. Tapia          Again, thank you for allowing me to participate in the care of your patient.      Sincerely,    HUGH Wright CNP

## 2022-09-29 NOTE — TELEPHONE ENCOUNTER
"Sent 90 day supply per pt request.  New rx was just sent yesterday.  Claudia Patterson RN          Requested Prescriptions   Pending Prescriptions Disp Refills     liothyronine (CYTOMEL) 5 MCG tablet [Pharmacy Med Name: LIOTHYRONINE 5MCG TABLETS] 90 tablet      Sig: TAKE 1 TABLET(5 MCG) BY MOUTH DAILY       Thyroid Protocol Passed - 9/29/2022  8:03 AM        Passed - Patient is 12 years or older        Passed - Recent (12 mo) or future (30 days) visit within the authorizing provider's specialty     Patient has had an office visit with the authorizing provider or a provider within the authorizing providers department within the previous 12 mos or has a future within next 30 days. See \"Patient Info\" tab in inbasket, or \"Choose Columns\" in Meds & Orders section of the refill encounter.              Passed - Medication is active on med list        Passed - Normal TSH on file in past 12 months     Recent Labs   Lab Test 09/20/22  1334   TSH 2.23              Passed - No active pregnancy on record     If patient is pregnant or has had a positive pregnancy test, please check TSH.          Passed - No positive pregnancy test in past 12 months     If patient is pregnant or has had a positive pregnancy test, please check TSH.               "

## 2022-09-29 NOTE — PROGRESS NOTES
BOTULINUM NEUROTOXIN INJECTION PROCEDURE:  DATA:9/29/2022  INDICATIONS FOR PROCEDURES:   chronic migraine headaches.  baseline symptoms have been recalcitrant to oral medications and conservative therapy.  total more than 15 headache days per month  topiramate 50 mg at bedtime , amitriptyline  Patient is here today for an initial injection with Botox.    GOAL OF PROCEDURE:  The goal of this procedure is to decrease pain and enhance functional independence.    TOTAL DOSE ADMINISTERED:  Dose Administered:  155 units  Botox (Botulinum Toxin Type A)       2:1 Dilution    Diluent Used:  Preservative Free Normal Saline  Wasted 45 units  Medication guide was offered to patient     CONSENT:  The risks, benefits, and treatment options were discussed with the patient who agreed to proceed.    Written consent was obtained     EQUIPMENT USED:  Needles-30 gauge, 0.5 inches for injections  Four 1-ml tuberculin syringes for injections  One sodium chloride 10 ml vial preservative free  Alcohol swabs    SKIN PREPARATION:  Skin preparation was performed using an alcohol wipe.      AREA/MUSCLE INJECTED:  155 units of Botox    Right upper Trapezius (upper cervical) - 5 units of Botox at 3 site/s.   Left upper Trapezius (upper cervical) - 5 units of Botox at 3 site/s.     Right cervical paraspinals - 5 units of Botox at 2 site/s.   Left cervical paraspinals - 5 units of Botox at 2 site/s.     Left occipitalis - 5 units of Botox at 3 site/s.  Right occipitalis -5 units of Botox at 3 site/s    Right Frontalis - 5 units of Botox at 2 site/s.  Left Frontalis - 5 units of Botox at 2 site/s.    Right Temporalis - 5 units of Botox at 4 site/s.  Left Temporalis - 5 units of Botox at 4 site/s.    Right  - 5 units of Botox at 1 site/s.              Left  - 5 units of Botox at 1 site/s.    Procerus - 5 units of Botox at 1 site/s.    RESPONSE TO PROCEDURE:  tolerated the procedure well and there were no immediate  complications.  Patient was allowed to recover for an appropriate period of time and was discharged home in stable condition.    FOLLOW UP:    Repeat Botox injections in 12 weeks      This procedure was performed under a hospital privileging agreement with HUGH Carey, [facility]

## 2022-10-17 ENCOUNTER — OFFICE VISIT (OUTPATIENT)
Dept: DERMATOLOGY | Facility: CLINIC | Age: 42
End: 2022-10-17
Payer: COMMERCIAL

## 2022-10-17 DIAGNOSIS — D48.5 NEOPLASM OF UNCERTAIN BEHAVIOR OF SKIN: ICD-10-CM

## 2022-10-17 PROCEDURE — 13121 CMPLX RPR S/A/L 2.6-7.5 CM: CPT | Performed by: STUDENT IN AN ORGANIZED HEALTH CARE EDUCATION/TRAINING PROGRAM

## 2022-10-17 PROCEDURE — 11402 EXC TR-EXT B9+MARG 1.1-2 CM: CPT | Performed by: STUDENT IN AN ORGANIZED HEALTH CARE EDUCATION/TRAINING PROGRAM

## 2022-10-17 PROCEDURE — 88305 TISSUE EXAM BY PATHOLOGIST: CPT | Performed by: DERMATOLOGY

## 2022-10-17 NOTE — PATIENT INSTRUCTIONS
Excision Wound Care Instructions  I will experience scar, altered skin color, bleeding, swelling, pain, crusting and redness. I may experience altered sensation. Risks are excessive bleeding, infection, muscle weakness, thick (hypertrophic or keloidal) scar, and recurrence. A second procedure may be recommended to obtain the best cosmetic or functional result.  Possible complications of any surgical procedure are bleeding, infection, scarring, alteration in skin color and sensation, muscle weakness in the area, wound dehiscence or seperation, or recurrence of the lesion or disease. On occasion, after healing, a secondary procedure or revision may be recommended in order to obtain the best cosmetic or functional result.   After your surgery, a pressure bandage will be placed over the area that has sutures. This will help prevent bleeding. Please follow these instructions until you come back to clinic for suture removal on day 7, as they will help you to prevent complications as your wound heals.  For the First 48 hours After Surgery:  Leave the pressure bandage on and keep it dry. If it should come loose, you may retape it, but do not take it off.  Relax and take it easy. Do not do any vigorous exercise, heavy lifting, or bending forward. This could cause the wound to bleed.  Post-operative pain is usually mild. You may alternate between 1000 mg of Tylenol (acetaminophen) and 400 mg of Ibuprofen every 4 hours.  Do not take more than 4,000mg of acetaminophen in a 24 hour period or 3200 mg of Ibuprofen in a 24 hr period.  Avoid alcohol and vitamin E as these may increase your tendency to bleed.  You may put an ice pack around the bandaged area for 20 minutes every 2-3 hours. This may help reduce swelling, bruising, and pain. Make sure the ice pack is waterproof so that the pressure bandage does not get wet.   You may see a small amount of drainage or blood on your pressure bandage. This is normal. However, if drainage  or bleeding continues or saturates the bandage, you will need to apply firm pressure over the bandage with a washcloth for 15 minutes. If bleeding continues after applying pressure for 15 minutes then go to the nearest emergency room.  48 Hours After Surgery  Carefully remove the bandage and start daily wound care and dressing changes. You may also now shower and get the wound wet.  Daily Wound Care:  Wash wound with a mild soap and water.  Use caution when washing the wound, be gentle and do not let the forceful shower stream hit the wound directly.  Pat dry.  Apply Vaseline (from a new container or tube) over the suture line with a Q-tip. It is very important to keep the wound continuously moist, as wounds heal best in a moist environment.  If you had stitches placed keep the site covered until sutures have either been removed or dissolve.  You can cover it with a Telfa (non-stick) dressing and tape or a band-aid.    If you are unable to keep wound covered, you must apply Vaseline every 2-3 hours (while awake) to ensure it is being kept moist for optimal healing. A dressing overnight is recommended to keep the area moist.  Call Us If:  You have pain that is not controlled with Tylenol/Ibuprofen  You have signs or symptoms of an infection, such as: fever over 100 degrees F, redness, warmth, or foul-smelling or yellow drainage from the wound.  Who should I call with questions?  [facility]: [phone]   [facility]: [phone]  [facility]: [phone]  For urgent needs outside of business hours call the [facility] at [phone] and ask to speak with the dermatology resident on call

## 2022-10-17 NOTE — LETTER
10/17/2022         RE: Dinorah Mai  [address] [place] [address]        Dear Colleague,    Thank you for referring your patient, Dinorah Mai, to the [facility]. Please see a copy of my visit note below.    DERMATOLOGIC SURGERY REPORT    NAME OF PROCEDURE:  EXCISION AND INTERMEDIATE CLOSURE    Surgeon:  Bang Puentes MD    PREOPERATIVE DIAGNOSIS: atypical nevus  POSTOPERATIVE DIAGNOSIS: Same  FINAL EXCISION SIZE: 6mm lesion with 3mm margins  Repair type: complex  FINAL REPAIR LENGTH:  37mm  Location: L thigh   Prior Biopsy Accession #: [id number]    INDICATIONS:Excision was indicated for treatment. We discussed the principles of treatment and most likely complications including bleeding, infection, wound dehiscence, pain, nerve damage, and scarring. Informed consent was obtained and the patient underwent the procedure as follows.    PROCEDURE:  The patient was taken to the operative suite. The treatment area was anesthetized with 1% lidocaine with [id number] epinephrine buffered with bicarbonate. The area was washed with Hibiclens, rinsed with saline and draped with sterile towels. The lesion was delineated and excised down to subcutaneous fat. Hemostasis was obtained by electrocoagulation. With a margin      REPAIR:  In order to repair this defect while maintaining the normal anatomic relations and function, we elected to utilize a linear closure. Closure was oriented so that the wound was in the patient's natural skin tension lines. Two redundant cones were removed by triangulation. Due to tightness of the surrounding skin deeper layers of the subcutaneous tissue were undermined extensively to a distance  greater than width of the defect on both sides by dissection in the subcutaneous plane until adequate tissue mobility was obtained. Deep dermal and subcutaneous layer closure was performed using 4-0 monocryl deep, intradermal and subcutaneous sutures.  Final  cutaneous approximation was achieved with 4-0 monocryl simple running sutures.      A total of 5mL of anesthesia was administered for all surgical sites. Estimated blood loss was less than 5mL for all surgical sites. A sterile pressure dressing was applied and wound care instructions, with a written handout, were given. The patient was discharged from the [facility] alert and ambulatory.    Bang Puentes M.D.      Department of Dermatology           Again, thank you for allowing me to participate in the care of your patient.        Sincerely,        Bang Puentes MD

## 2022-10-20 LAB
PATH REPORT.COMMENTS IMP SPEC: NORMAL
PATH REPORT.COMMENTS IMP SPEC: NORMAL
PATH REPORT.FINAL DX SPEC: NORMAL
PATH REPORT.GROSS SPEC: NORMAL
PATH REPORT.MICROSCOPIC SPEC OTHER STN: NORMAL
PATH REPORT.RELEVANT HX SPEC: NORMAL

## 2022-11-02 ENCOUNTER — OFFICE VISIT (OUTPATIENT)
Dept: OBGYN | Facility: CLINIC | Age: 42
End: 2022-11-02
Payer: COMMERCIAL

## 2022-11-02 VITALS
WEIGHT: 127 LBS | SYSTOLIC BLOOD PRESSURE: 105 MMHG | BODY MASS INDEX: 22.5 KG/M2 | HEIGHT: 63 IN | HEART RATE: 89 BPM | OXYGEN SATURATION: 97 % | DIASTOLIC BLOOD PRESSURE: 76 MMHG

## 2022-11-02 DIAGNOSIS — N92.0 MENORRHAGIA WITH REGULAR CYCLE: ICD-10-CM

## 2022-11-02 DIAGNOSIS — Z00.00 ANNUAL PHYSICAL EXAM: Primary | ICD-10-CM

## 2022-11-02 DIAGNOSIS — Z23 NEED FOR PROPHYLACTIC VACCINATION AND INOCULATION AGAINST INFLUENZA: ICD-10-CM

## 2022-11-02 DIAGNOSIS — Z23 HIGH PRIORITY FOR 2019-NCOV VACCINE: ICD-10-CM

## 2022-11-02 PROCEDURE — 99213 OFFICE O/P EST LOW 20 MIN: CPT | Mod: 25 | Performed by: OBSTETRICS & GYNECOLOGY

## 2022-11-02 PROCEDURE — 99386 PREV VISIT NEW AGE 40-64: CPT | Mod: 25 | Performed by: OBSTETRICS & GYNECOLOGY

## 2022-11-02 PROCEDURE — 90471 IMMUNIZATION ADMIN: CPT | Performed by: OBSTETRICS & GYNECOLOGY

## 2022-11-02 PROCEDURE — 91312 COVID-19,PF,PFIZER BOOSTER BIVALENT: CPT | Performed by: OBSTETRICS & GYNECOLOGY

## 2022-11-02 PROCEDURE — 0124A COVID-19,PF,PFIZER BOOSTER BIVALENT: CPT | Performed by: OBSTETRICS & GYNECOLOGY

## 2022-11-02 PROCEDURE — 90686 IIV4 VACC NO PRSV 0.5 ML IM: CPT | Performed by: OBSTETRICS & GYNECOLOGY

## 2022-11-02 RX ORDER — TRANEXAMIC ACID 650 MG/1
TABLET ORAL
Qty: 30 TABLET | Refills: 11 | Status: SHIPPED | OUTPATIENT
Start: 2022-11-02 | End: 2024-04-03

## 2022-11-02 NOTE — PROGRESS NOTES
Dinorah is a 42 year old No obstetric history on file. female who presents for annual exam.     Menses are regular q 28-30 days and normal lasting 4 days.  Menses flow: normal.  Patient's last menstrual period was 10/17/2022.. Using vasectomy for contraception.  She is not currently considering pregnancy.  Besides routine health maintenance, she has no other health concerns today .  Heavy bleeding started after birth of child in 2016.  Had other symptoms as well.  Explored traditional medicine, naturopath.  Other symptoms improved, but not bleeding so much.  Last pelvic US in 2019.  Bad experiences with birth control before.  Had Mirena IUD for brief period of time.  Has been taking Lysteda, which works to some degree.  However, the rx she has only lasts about 2.5 days, so ultimately doesn't notice a huge difference.    GYNECOLOGIC HISTORY:  Menarche: 13  Age at first intercourse: 17 Number of lifetime partners: 4  Dinorah is sexually active with 1male partner(s) and is currently in monogamous relationship with .    History sexually transmitted infections:No STD history  STI testing offered?  Declined  CHANTELLE exposure: No  History of abnormal Pap smear: NO - age 30-65 PAP every 5 years with negative HPV co-testing recommended  Family history of breast CA: No  Family history of uterine/ovarian CA: No    Family history of colon CA: No    HEALTH MAINTENANCE:  Cholesterol: (No results found for: CHOL History of abnormal lipids: No  Mammo:23   History of abnormal Mammo: No.  Regular Self Breast Exams: Yes  Calcium/Vitamin D intake: source:  dairy, dietary supplement(s) Adequate? Yes  TSH: (  TSH   Date Value Ref Range Status   2022 2.23 0.40 - 4.00 mU/L Final    )  Pap; (No results found for: PAP )    HISTORY:  OB History    Para Term  AB Living   3 3 3 0 0 3   SAB IAB Ectopic Multiple Live Births   0 0 0 0 3      # Outcome Date GA Lbr Mejia/2nd Weight Sex Delivery Anes PTL Lv   3 Term       Vag-Spont   SUDARSHAN   2 Term      Vag-Spont   SUDARSHAN   1 Term      Vag-Spont   SUDARSHAN     Past Medical History:   Diagnosis Date     Hypothyroidism due to Hashimoto's thyroiditis      History reviewed. No pertinent surgical history.  Family History   Problem Relation Age of Onset     ALS Mother      Skin Cancer Father      Social History     Socioeconomic History     Marital status:      Spouse name: None     Number of children: None     Years of education: None     Highest education level: None   Tobacco Use     Smoking status: Never     Smokeless tobacco: Never   Substance and Sexual Activity     Alcohol use: Yes     Comment: 2 drinks a week      Drug use: Never     Sexual activity: Yes     Partners: Male     Birth control/protection: None   Other Topics Concern     Parent/sibling w/ CABG, MI or angioplasty before 65F 55M? No       Current Outpatient Medications:      amitriptyline (ELAVIL) 10 MG tablet, Take 1 tablet (10 mg) by mouth At Bedtime, Disp: 90 tablet, Rfl: 3     aspirin (ASA) 81 MG chewable tablet, Take 81 mg by mouth daily, Disp: , Rfl:      cholecalciferol 25 MCG (1000 UT) TABS, , Disp: , Rfl:      ferrous sulfate (FEROSUL) 325 (65 Fe) MG tablet, Take 1 tablet by mouth, Disp: , Rfl:      folic acid (FOLVITE) 1 MG tablet, Take 1 tablet (1 mg) by mouth daily, Disp: 90 tablet, Rfl: 3     levothyroxine (SYNTHROID/LEVOTHROID) 75 MCG tablet, Take 1 tablet (75 mcg) by mouth daily, Disp: 90 tablet, Rfl: 3     liothyronine (CYTOMEL) 5 MCG tablet, TAKE 1 TABLET(5 MCG) BY MOUTH DAILY, Disp: 90 tablet, Rfl: 1     Magnesium Gluconate 550 MG TABS, Take 30 mg by mouth, Disp: , Rfl:      naratriptan (AMERGE) 2.5 MG tablet, Take 1 tablet (2.5 mg) by mouth at onset of headache for migraine May repeat in 4 hours. Max 2 tablets/24 hours., Disp: 18 tablet, Rfl: 9     SUMAtriptan (IMITREX) 50 MG tablet, Take 1 tablet (50 mg) by mouth at onset of headache for migraine Take with dose of Aleve.  May repeat in 2 hours, Disp: 12  "tablet, Rfl: 3     topiramate (TOPAMAX) 25 MG tablet, Take one tab am and two tabs at bedtime for one week than two tabs twice daily, Disp: 120 tablet, Rfl: 6     tranexamic acid (LYSTEDA) 650 MG tablet, Take 2 tablets three times daily for up to 5 days, starting with onset of heavy menstruation., Disp: 30 tablet, Rfl: 11     tranexamic acid (LYSTEDA) 650 MG tablet, TAKE 2 TABLET BY MOUTH THREE TIMES DAILY FOR UP TO FIVE DAYS DURING MONTHLY MENSTRUATION, Disp: 15 tablet, Rfl: 3     vitamin B complex with vitamin C (STRESS TAB) tablet, Take 1 tablet by mouth daily, Disp: , Rfl:     Current Facility-Administered Medications:      Botulinum Toxin Type A (BOTOX) 200 units injection 155 Units, 155 Units, Intramuscular, See Admin Instructions, Ally Dyer APRN CNP, 155 Units at 09/29/22 0956   No Known Allergies    Past medical, surgical, social and family history were reviewed and updated in EPIC.    ROS:   C:     NEGATIVE for fever, chills, change in weight  I:       NEGATIVE for worrisome rashes, moles or lesions  E:     NEGATIVE for vision changes or irritation  E/M: NEGATIVE for ear, mouth and throat problems  R:     NEGATIVE for significant cough or SOB  CV:   NEGATIVE for chest pain, palpitations or peripheral edema  GI:     NEGATIVE for nausea, abdominal pain, heartburn, or change in bowel habits  :   Pos per HPI.  NEGATIVE for frequency, dysuria, hematuria, vaginal discharge, or irregular bleeding  M:     NEGATIVE for significant arthralgias or myalgia  N:      NEGATIVE for weakness, dizziness or paresthesias  E:      NEGATIVE for temperature intolerance, skin/hair changes  P:      NEGATIVE for changes in mood or affect.    EXAM:  /76   Pulse 89   Ht 1.6 m (5' 3\")   Wt 57.6 kg (127 lb)   LMP 10/17/2022   SpO2 97%   BMI 22.50 kg/m     BMI: Body mass index is 22.5 kg/m .  Constitutional: healthy, alert and no distress  Head: Normocephalic. No masses, lesions, tenderness or " abnormalities  Neck: Neck supple. Trachea midline. No adenopathy. Thyroid symmetric, normal size.   Cardiovascular: RRR.   Respiratory: Negative.   Breast: No nodularity, asymmetry or nipple discharge bilaterally.  Gastrointestinal: Abdomen soft, non-tender, non-distended. No masses, organomegaly.  :  Vulva:  No external lesions, normal female hair distribution, no inguinal adenopathy.    Urethra:  Midline, non-tender, well supported, no discharge  Vagina:  Moist, pink, no abnormal discharge, no lesions  Uterus:  Normal size , non-tender, freely mobile  Ovaries:  No masses appreciated, non-tender, mobile  Rectal Exam: deferred  Musculoskeletal: extremities normal  Skin: no suspicious lesions or rashes  Psychiatric: Affect appropriate, cooperative,mentation appears normal.     COUNSELING:   Reviewed preventive health counseling, as reflected in patient instructions   reports that she has never smoked. She has never used smokeless tobacco.    Body mass index is 22.5 kg/m .    FRAX Risk Assessment    ASSESSMENT:  42 year old female with satisfactory annual exam  (Z00.00) Annual physical exam  (primary encounter diagnosis)  Comment: Reviewed recommended HM    (N92.0) Menorrhagia with regular cycle  Comment: Coping ok with cycles at this point.  Declines contraceptive methods, would rather just deal with it.  Did offer rx for TXA that would last the full 5 days and she was agreeable.  However, not certain whether or not she'll pick it up  Also aware we could repeat US, as it has been a number of years and was at a point in her cycle when lining was rather thick.  May get more info by repeating US near end of period.  Plan: tranexamic acid (LYSTEDA) 650 MG tablet    (Z23) Need for prophylactic vaccination and inoculation against influenza  Plan: INFLUENZA VACCINE IM > 6 MONTHS VALENT IIV4         (AFLURIA/FLUZONE)          (Z23) High priority for 2019-nCoV vaccine  Plan: COVID-19,PF,PFIZER BOOSTER BIVALENT 12+Yrs             RTC 1 year and prn.    Ana María Alicea MD

## 2022-12-29 ENCOUNTER — OFFICE VISIT (OUTPATIENT)
Dept: NEUROLOGY | Facility: CLINIC | Age: 42
End: 2022-12-29
Payer: COMMERCIAL

## 2022-12-29 DIAGNOSIS — G43.709 CHRONIC MIGRAINE WITHOUT AURA WITHOUT STATUS MIGRAINOSUS, NOT INTRACTABLE: ICD-10-CM

## 2022-12-29 PROCEDURE — 99207 PR SATISFY VISIT NUMBER: CPT | Performed by: NURSE PRACTITIONER

## 2022-12-29 PROCEDURE — 64615 CHEMODENERV MUSC MIGRAINE: CPT | Performed by: NURSE PRACTITIONER

## 2022-12-29 RX ORDER — AMITRIPTYLINE HYDROCHLORIDE 10 MG/1
10 TABLET ORAL AT BEDTIME
Qty: 90 TABLET | Refills: 3 | Status: SHIPPED | OUTPATIENT
Start: 2022-12-29 | End: 2024-01-18

## 2022-12-29 RX ORDER — TOPIRAMATE 50 MG/1
50 TABLET, FILM COATED ORAL 2 TIMES DAILY
Qty: 180 TABLET | Refills: 3 | Status: SHIPPED | OUTPATIENT
Start: 2022-12-29 | End: 2023-03-30

## 2022-12-29 ASSESSMENT — HEADACHE IMPACT TEST (HIT 6): WHEN YOU HAVE HEADACHES HOW OFTEN IS THE PAIN SEVERE: VERY OFTEN

## 2022-12-29 NOTE — LETTER
12/29/2022       RE: Dinorah Mai  [address] [place] [address]     Dear Colleague,    Thank you for referring your patient, Dinorah Mai, to the [facility] [place] [facility]. Please see a copy of my visit note below.    BOTULINUM NEUROTOXIN INJECTION PROCEDURE:  DATA:12/29/2022  INDICATIONS FOR PROCEDURES:   chronic migraine headaches.  baseline symptoms have been recalcitrant to oral medications and conservative therapy.  total baseline more than 15 headache days per month  Since Botox on 9/29/2022 -4-5 headaches in 90 days-a significant improvement   topiramate 50 mg at bedtime , amitriptyline  Patient is here today for an initial injection with Botox.     GOAL OF PROCEDURE:  The goal of this procedure is to decrease pain and enhance functional independence.     TOTAL DOSE ADMINISTERED:  Dose Administered:  155 units  Botox (Botulinum Toxin Type A)       2:1 Dilution    Diluent Used:  Preservative Free Normal Saline  Wasted 45 units  See MAR   Medication guide was offered to patient      CONSENT:  The risks, benefits, and treatment options were discussed with the patient who agreed to proceed.     Written consent was obtained      EQUIPMENT USED:  Needles-30 gauge, 0.5 inches for injections  Four 1-ml tuberculin syringes for injections  One sodium chloride 10 ml vial preservative free  Alcohol swabs     SKIN PREPARATION:  Skin preparation was performed using an alcohol wipe.        AREA/MUSCLE INJECTED:  155 units of Botox     Right upper Trapezius (upper cervical) - 5 units of Botox at 3 site/s.   Left upper Trapezius (upper cervical) - 5 units of Botox at 3 site/s.      Right cervical paraspinals - 5 units of Botox at 2 site/s.   Left cervical paraspinals - 5 units of Botox at 2 site/s.      Left occipitalis - 5 units of Botox at 3 site/s.  Right occipitalis -5 units of Botox at 3 site/s     Right Frontalis - 5 units  of Botox at 2 site/s.  Left Frontalis - 5 units of Botox at 2 site/s.     Right Temporalis - 5 units of Botox at 4 site/s.  Left Temporalis - 5 units of Botox at 4 site/s.     Right  - 5 units of Botox at 1 site/s.              Left  - 5 units of Botox at 1 site/s.     Procerus - 5 units of Botox at 1 site/s.     RESPONSE TO PROCEDURE:  tolerated the procedure well and there were no immediate complications.  Patient was allowed to recover for an appropriate period of time and was discharged home in stable condition.     FOLLOW UP:     Repeat Botox injections in 12 weeks        This procedure was performed under a hospital privileging agreement with HUGH Carey, [facility]

## 2022-12-29 NOTE — PROGRESS NOTES
BOTULINUM NEUROTOXIN INJECTION PROCEDURE:  DATA:12/29/2022  INDICATIONS FOR PROCEDURES:   chronic migraine headaches.  baseline symptoms have been recalcitrant to oral medications and conservative therapy.  total baseline more than 15 headache days per month  Since Botox on 9/29/2022 -4-5 headaches in 90 days-a significant improvement   topiramate 50 mg at bedtime , amitriptyline  Patient is here today for an initial injection with Botox.     GOAL OF PROCEDURE:  The goal of this procedure is to decrease pain and enhance functional independence.     TOTAL DOSE ADMINISTERED:  Dose Administered:  155 units  Botox (Botulinum Toxin Type A)       2:1 Dilution    Diluent Used:  Preservative Free Normal Saline  Wasted 45 units  See MAR   Medication guide was offered to patient      CONSENT:  The risks, benefits, and treatment options were discussed with the patient who agreed to proceed.     Written consent was obtained      EQUIPMENT USED:  Needles-30 gauge, 0.5 inches for injections  Four 1-ml tuberculin syringes for injections  One sodium chloride 10 ml vial preservative free  Alcohol swabs     SKIN PREPARATION:  Skin preparation was performed using an alcohol wipe.        AREA/MUSCLE INJECTED:  155 units of Botox     Right upper Trapezius (upper cervical) - 5 units of Botox at 3 site/s.   Left upper Trapezius (upper cervical) - 5 units of Botox at 3 site/s.      Right cervical paraspinals - 5 units of Botox at 2 site/s.   Left cervical paraspinals - 5 units of Botox at 2 site/s.      Left occipitalis - 5 units of Botox at 3 site/s.  Right occipitalis -5 units of Botox at 3 site/s     Right Frontalis - 5 units of Botox at 2 site/s.  Left Frontalis - 5 units of Botox at 2 site/s.     Right Temporalis - 5 units of Botox at 4 site/s.  Left Temporalis - 5 units of Botox at 4 site/s.     Right  - 5 units of Botox at 1 site/s.              Left  - 5 units of Botox at 1 site/s.     Procerus - 5 units of  Botox at 1 site/s.     RESPONSE TO PROCEDURE:  tolerated the procedure well and there were no immediate complications.  Patient was allowed to recover for an appropriate period of time and was discharged home in stable condition.     FOLLOW UP:     Repeat Botox injections in 12 weeks        This procedure was performed under a hospital privileging agreement with HUGH Carey, [facility]

## 2023-01-21 DIAGNOSIS — G43.709 CHRONIC MIGRAINE WITHOUT AURA WITHOUT STATUS MIGRAINOSUS, NOT INTRACTABLE: ICD-10-CM

## 2023-01-22 NOTE — TELEPHONE ENCOUNTER
"Routing refill request to provider for review/approval because:  Needs review  Pt also has 50 mg on med list    Last Written Prescription Date:  9/14/22  Last Fill Quantity: 120,  # refills: 6   Last office visit provider:  5/23/22     Requested Prescriptions   Pending Prescriptions Disp Refills     topiramate (TOPAMAX) 25 MG tablet [Pharmacy Med Name: TOPIRAMATE 25MG TABLETS] 60 tablet      Sig: TAKE 1 TABLET(25 MG) BY MOUTH TWICE DAILY       Anti-Seizure Meds Protocol  Failed - 1/21/2023  5:22 AM        Failed - Review Authorizing provider's last note.      Refer to last progress notes: confirm request is for original authorizing provider (cannot be through other providers).          Passed - Recent (12 mo) or future (30 days) visit within the authorizing provider's specialty     Patient has had an office visit with the authorizing provider or a provider within the authorizing providers department within the previous 12 mos or has a future within next 30 days. See \"Patient Info\" tab in inbasket, or \"Choose Columns\" in Meds & Orders section of the refill encounter.              Passed - Normal CBC on file in past 26 months     Recent Labs   Lab Test 04/07/21  1143   WBC 6.8   RBC 4.72   HGB 14.0   HCT 43.1                    Passed - Normal ALT or AST on file in past 26 months     Recent Labs   Lab Test 04/07/21  1143   ALT 25     Recent Labs   Lab Test 04/07/21  1143   AST 24             Passed - Normal platelet count on file in past 26 months     Recent Labs   Lab Test 04/07/21  1143                  Passed - Medication is active on med list        Passed - No active pregnancy on record        Passed - No positive pregnancy test in last 12 months             Benita Forte, RN 01/22/23 2:46 PM  "

## 2023-01-23 RX ORDER — TOPIRAMATE 25 MG/1
TABLET, FILM COATED ORAL
Qty: 60 TABLET | Refills: 11 | Status: SHIPPED | OUTPATIENT
Start: 2023-01-23 | End: 2023-01-30

## 2023-01-30 ENCOUNTER — VIRTUAL VISIT (OUTPATIENT)
Dept: INTERNAL MEDICINE | Facility: CLINIC | Age: 43
End: 2023-01-30
Payer: COMMERCIAL

## 2023-01-30 ENCOUNTER — MYC MEDICAL ADVICE (OUTPATIENT)
Dept: ENDOCRINOLOGY | Facility: CLINIC | Age: 43
End: 2023-01-30

## 2023-01-30 DIAGNOSIS — G43.709 CHRONIC MIGRAINE WITHOUT AURA WITHOUT STATUS MIGRAINOSUS, NOT INTRACTABLE: Primary | ICD-10-CM

## 2023-01-30 DIAGNOSIS — M54.2 CERVICALGIA: ICD-10-CM

## 2023-01-30 PROCEDURE — 99214 OFFICE O/P EST MOD 30 MIN: CPT | Mod: 95 | Performed by: INTERNAL MEDICINE

## 2023-01-30 RX ORDER — BACLOFEN 10 MG/1
10 TABLET ORAL 3 TIMES DAILY PRN
Qty: 20 TABLET | Refills: 1 | Status: SHIPPED | OUTPATIENT
Start: 2023-01-30

## 2023-01-30 NOTE — PROGRESS NOTES
"Dinorah is a 43 year old female contacting the clinic today via video, who will use the platform: [employer] for the visit.  Phone # for [employer], or if Amwell drops:   Telephone Information:   Mobile [phone]          ASSESSMENT and PLAN:  1. Chronic migraine without aura without status migrainosus, not intractable  Marked improvement on amitriptyline, topiramate, and Botox.  Reasonable to try tapering topiramate    2. Cervicalgia  Consider fibromyalgia.  Consider unexpected spinal stenosis.  Trial of baclofen.  No improvement on amitriptyline, topiramate and Botox.  No other arthralgias.  Referral to spine clinic to determine whether further imaging is needed or intervention  - baclofen (LIORESAL) 10 MG tablet; Take 1 tablet (10 mg) by mouth 3 times daily as needed for muscle spasms  Dispense: 20 tablet; Refill: 1  - Spine  Referral; Future       Patient Instructions   Decrease topiramate to 50 mg at bed for 5 days then 25 mg at bed for 5 days then discontinue topiramate    Baclofen 10 mg as needed    Continue amitriptyline and folic acid    Referral to spine clinic regarding chronic persisting cervicalgia            Return in about 1 year (around 1/30/2024) for using a video visit.       CHIEF COMPLAINT:  Chief Complaint   Patient presents with     Office Visit     Wants to taper off of topiramate/Topamax and discuss alternatives.     Recheck Medication       History of Present Illness       Headaches:   Since the patient's last clinic visit, headaches are: improved  The patient is getting headaches:  1/month  She is able to do normal daily activities when she has a migraine.  The patient is taking the following rescue/relief medications:  Naproxyn (Aleve) and sumatriptan (Imitrex)   Patient states \"I get some relief\" from the rescue/relief medications.   The patient is taking the following medications to prevent migraines:  Topomax, Amitriptyline and other  In the past 4 weeks, the patient has gone to an " "Urgent Care or Emergency Room 0 times times due to headaches.    She eats 4 or more servings of fruits and vegetables daily.She consumes 0 sweetened beverage(s) daily.She exercises with enough effort to increase her heart rate 30 to 60 minutes per day.  She exercises with enough effort to increase her heart rate 6 days per week.   She is taking medications regularly.      HISTORY OF PRESENT ILLNESS:  Dinorah is a 43 year old female contacting the clinic today via video for follow-up of migraines.  When we spoke a year ago she was placed on aspirin, folate, amitriptyline, and topiramate, and then referred to a migraine clinic.  She has been initiated on Botox.  All of these modalities have markedly improved her migraines.  She is having side effects of the topiramate and wonders whether she can taper to see how the other medications are doing.  Neurology discontinue aspirin    She has longstanding neck pain.  This involves her neck and shoulders.  The above modalities have not improved that pain.  Symptoms are ongoing at least 10 and may be 20 years.  Chiropractic manipulation years ago did not help and no other medicines have.  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  There were no vitals taken for this visit. Estimated body mass index is 22.5 kg/m  as calculated from the following:    Height as of 11/2/22: 1.6 m (5' 3\").    Weight as of 11/2/22: 57.6 kg (127 lb).    PHYSICAL EXAM:  (observations via Video)  Alert and oriented    MEDICATIONS:   Current Outpatient Medications   Medication Sig Dispense Refill     amitriptyline (ELAVIL) 10 MG tablet Take 1 tablet (10 mg) by mouth At Bedtime 90 tablet 3     baclofen " (LIORESAL) 10 MG tablet Take 1 tablet (10 mg) by mouth 3 times daily as needed for muscle spasms 20 tablet 1     cholecalciferol 25 MCG (1000 UT) TABS        folic acid (FOLVITE) 1 MG tablet Take 1 tablet (1 mg) by mouth daily 90 tablet 3     levothyroxine (SYNTHROID/LEVOTHROID) 75 MCG tablet Take 1 tablet (75 mcg) by mouth daily 90 tablet 3     Magnesium Gluconate 550 MG TABS Take 30 mg by mouth       naratriptan (AMERGE) 2.5 MG tablet Take 1 tablet (2.5 mg) by mouth at onset of headache for migraine May repeat in 4 hours. Max 2 tablets/24 hours. 18 tablet 9     tranexamic acid (LYSTEDA) 650 MG tablet Take 2 tablets three times daily for up to 5 days, starting with onset of heavy menstruation. 30 tablet 11     vitamin B complex with vitamin C (STRESS TAB) tablet Take 1 tablet by mouth daily       ferrous sulfate (FEROSUL) 325 (65 Fe) MG tablet Take 1 tablet by mouth       liothyronine (CYTOMEL) 5 MCG tablet TAKE 1 TABLET(5 MCG) BY MOUTH DAILY 90 tablet 1     topiramate (TOPAMAX) 50 MG tablet Take 1 tablet (50 mg) by mouth 2 times daily 180 tablet 3       Outside Notes summarized: Neurology  Labs, x-rays, cardiology, GI tests reviewed:   Recent Labs   Lab Test 09/20/22  1334 04/07/21  1143   HGB  --  14.0   WBC  --  6.8   NA  --  141   POTASSIUM  --  4.3   CR  --  0.77   TSH 2.23 2.61     No results found for: [id number]  No results found for: CHOL  New orders:   Orders Placed This Encounter   Procedures     Spine  Referral       Independent review of:     Lucas Kennedy MD  [facility]    Video-Visit Details  Type of service:  Video Visit  Patient has given verbal consent to a Video visit?  Yes  How would you like to obtain your AVS?  MyChart  Will anyone else be joining your video visit, giving supplemental history? No    Originating location (pt location): Home    Distant Location (provider location):  Off-site    Video Start Time: 10:58 AM  Video End time:  11:30  AM  Face to face plus orders: 32 minutes  Documentation time:  3 minutes     The visit lasted a total of 35 minutes

## 2023-01-30 NOTE — PATIENT INSTRUCTIONS
Decrease topiramate to 50 mg at bed for 5 days then 25 mg at bed for 5 days then discontinue topiramate    Baclofen 10 mg as needed    Continue amitriptyline and folic acid    Referral to spine clinic regarding chronic persisting cervicalgia

## 2023-01-30 NOTE — PROGRESS NOTES
"Dinorah is a 43 year old who is being evaluated via a billable video visit.      How would you like to obtain your AVS? MyChart  If the video visit is dropped, the invitation should be resent by: Send to e-mail at: [email or web address]  Will anyone else be joining your video visit? No  {If patient encounters technical issues they should call [phone] :923143}        {PROVIDER CHARTING PREFERENCE:532383}    Subjective   Dinorah is a 43 year old accompanied by her self, presenting for the following health issues:  Office Visit (Wants to taper off of topiramate/Topamax and discuss alternatives.) and Recheck Medication      History of Present Illness       Headaches:   Since the patient's last clinic visit, headaches are: improved  The patient is getting headaches:  1/month  She is able to do normal daily activities when she has a migraine.  The patient is taking the following rescue/relief medications:  Naproxyn (Aleve) and sumatriptan (Imitrex)   Patient states \"I get some relief\" from the rescue/relief medications.   The patient is taking the following medications to prevent migraines:  Topomax, Amitriptyline and other  In the past 4 weeks, the patient has gone to an Urgent Care or Emergency Room 0 times times due to headaches.    She eats 4 or more servings of fruits and vegetables daily.She consumes 0 sweetened beverage(s) daily.She exercises with enough effort to increase her heart rate 30 to 60 minutes per day.  She exercises with enough effort to increase her heart rate 6 days per week.   She is taking medications regularly.       {SUPERLIST (Optional):733569}  {additonal problems for provider to add (Optional):381250}    Review of Systems   {ROS COMP (Optional):943847}      Objective           Vitals:  No vitals were obtained today due to virtual visit.    Physical Exam   {video visit exam brief selected:379685::\"GENERAL: Healthy, alert and no distress\",\"EYES: Eyes grossly normal to inspection.  No " "discharge or erythema, or obvious scleral/conjunctival abnormalities.\",\"RESP: No audible wheeze, cough, or visible cyanosis.  No visible retractions or increased work of breathing.  \",\"SKIN: Visible skin clear. No significant rash, abnormal pigmentation or lesions.\",\"NEURO: Cranial nerves grossly intact.  Mentation and speech appropriate for age.\",\"PSYCH: Mentation appears normal, affect normal/bright, judgement and insight intact, normal speech and appearance well-groomed.\"}    {Diagnostic Test Results (Optional):143043}    {AMBULATORY ATTESTATION (Optional):981868}        Video-Visit Details    Type of service:  Video Visit     Originating Location (pt. Location): Home  {PROVIDER LOCATION On-site should be selected for visits conducted from your clinic location or adjoining [facility] hospital, academic office, or other nearby [facility] building. Off-site should be selected for all other provider locations, including home:591316}  Distant Location (provider location):  {virtual location provider:799554}  Platform used for Video Visit: [employer]    "

## 2023-01-31 DIAGNOSIS — E06.3 HYPOTHYROIDISM DUE TO HASHIMOTO'S THYROIDITIS: ICD-10-CM

## 2023-01-31 DIAGNOSIS — E03.9 HYPOTHYROIDISM, UNSPECIFIED TYPE: ICD-10-CM

## 2023-01-31 RX ORDER — LIOTHYRONINE SODIUM 5 UG/1
TABLET ORAL
Qty: 90 TABLET | Refills: 1 | Status: SHIPPED | OUTPATIENT
Start: 2023-01-31 | End: 2023-09-06

## 2023-01-31 RX ORDER — LEVOTHYROXINE SODIUM 75 UG/1
75 TABLET ORAL DAILY
Qty: 90 TABLET | Refills: 3 | Status: SHIPPED | OUTPATIENT
Start: 2023-01-31 | End: 2023-09-06

## 2023-02-07 ENCOUNTER — LAB (OUTPATIENT)
Dept: LAB | Facility: CLINIC | Age: 43
End: 2023-02-07
Payer: COMMERCIAL

## 2023-02-07 DIAGNOSIS — E06.3 HYPOTHYROIDISM DUE TO HASHIMOTO'S THYROIDITIS: ICD-10-CM

## 2023-02-07 DIAGNOSIS — E03.9 HYPOTHYROIDISM, UNSPECIFIED TYPE: ICD-10-CM

## 2023-02-07 LAB
T3 SERPL-MCNC: 118 NG/DL (ref 85–202)
T4 FREE SERPL-MCNC: 1.3 NG/DL (ref 0.9–1.7)
TSH SERPL DL<=0.005 MIU/L-ACNC: 2.63 UIU/ML (ref 0.3–4.2)

## 2023-02-07 PROCEDURE — 36415 COLL VENOUS BLD VENIPUNCTURE: CPT

## 2023-02-07 PROCEDURE — 84480 ASSAY TRIIODOTHYRONINE (T3): CPT

## 2023-02-07 PROCEDURE — 84443 ASSAY THYROID STIM HORMONE: CPT

## 2023-02-07 PROCEDURE — 84439 ASSAY OF FREE THYROXINE: CPT

## 2023-02-23 NOTE — PROGRESS NOTES
ASSESSMENT: Dinorah Mai is a 43 year old female with past medical history significant for chronic migraine, hypothyroidism who presents today for new patient evaluation of chronic bilateral neck pain with radiation to the bilateral shoulders.  Patient has not had any imaging of the cervical spine.  Pain seems largely myofascial but it is unusual that her pain has not improved with conservative treatment.  She has tried Pilates/stretching, acupuncture, dry needling, chiropractic treatment, craniosacral therapy, and medications including baclofen.  She has also tried Botox for chronic migraine which involves injections into the upper trapezius muscles.  None of these modalities have provided any meaningful relief.  I would like to evaluate for possible underlying cervical spondylosis which could be triggering muscle spasm/myofascial pain.  She was neurologically intact.        PLAN:  A shared decision making model was used.  The patient's values and choices were respected.  The following represents what was discussed and decided upon by the physician assistant and the patient.      1.  DIAGNOSTIC TESTS: I ordered an MRI cervical spine for further evaluation.    2.  PHYSICAL THERAPY: No physical therapy was ordered.  She is currently doing craniosacral therapy.  We will await the results of her MRI.  We could consider additional physical therapy if needed.    3.  MEDICATIONS: No changes are made to the patient's medications.  - Patient recently started baclofen which she takes as needed and is somewhat helpful.  - Patient also takes Advil as needed.    4.  INTERVENTIONS:  No interventions were ordered.  We will await the results of her MRI.  - If this shows spinal stenosis or foraminal stenosis we could consider an epidural steroid injection.  - If there is facet arthropathy we could consider medial branch blocks to determine if there is facet mediated pain.  - Patient gets Botox injections for chronic migraine  but these have not helped with her  Operative details muscle pain.    5.  PATIENT EDUCATION: Patient is in agreement the above plan.  All questions were answered.    6.  FOLLOW-UP:   I will send the patient a [employer] message with her MRI results.  If there is significant neural compromise I would likely recommend the epidural steroid injection.  If there is significant facet arthropathy in the lower cervical region we could consider the medial branch blocks.  I will likely recommend that she return to the clinic to review the results and discuss treatment options.  If she has questions or concerns in the meantime, she should not hesitate to call.        SUBJECTIVE:  Dinorah Mai  Is a 43 year old female who presents today in consultation at request of Dr. Kennedy for new patient evaluation of neck pain.  Patient reports that she has had neck and shoulder pain for 5 to 10 years.  She denies any injury or event to cause the pain.  She also deals with chronic migraine.  She started getting Botox injections for chronic migraine 6 months ago.  This has been very helpful for her chronic migraine.  She was hopeful that the Botox injections would also help with her neck pain since they do Botox injections into the upper trapezius muscles as part of the migraine paradigm.  Unfortunately, the Botox injections did not have any meaningful benefit for her neck pain.    Patient complains of bilateral neck pain.  Pain is located in the lower cervical region and extends into the upper trapezius muscles bilaterally to the shoulders.  She denies any pain down the arms.  Both sides are equally affected.  She states that she has a constant nagging pain.  She states her muscles feel very tight even though she is very conscientious to try to relax those muscles.  She has intermittent tingling in both hands which comes and goes.  She does not feel that this is related to her neck pain.  Denies weakness in the arms.  Pain is  aggravated doing push-ups and lifting weights.  Pain is alleviated with stretching.  She denies recent fevers, chills, sweats.  Denies loss of bowel or bladder control.    Treatment to date:  - Regular Pilates/stretching  - Chiropractic treatment 5 years ago with no help  - Acupuncture with no help  - Dry needling with no help  - Sacral therapy x1 was helpful and she has another session scheduled for later this week.  - Recently started baclofen which, when taken in combination with Advil and applying IcyHot provide some relief    Current Outpatient Medications   Medication     amitriptyline (ELAVIL) 10 MG tablet     baclofen (LIORESAL) 10 MG tablet     cholecalciferol 25 MCG (1000 UT) TABS     ferrous sulfate (FEROSUL) 325 (65 Fe) MG tablet     folic acid (FOLVITE) 1 MG tablet     levothyroxine (SYNTHROID/LEVOTHROID) 75 MCG tablet     liothyronine (CYTOMEL) 5 MCG tablet     Magnesium Gluconate 550 MG TABS     naratriptan (AMERGE) 2.5 MG tablet     topiramate (TOPAMAX) 50 MG tablet     tranexamic acid (LYSTEDA) 650 MG tablet     vitamin B complex with vitamin C (STRESS TAB) tablet     Current Facility-Administered Medications   Medication     Botulinum Toxin Type A (BOTOX) 200 units injection 155 Units       No Known Allergies    Past Medical History:   Diagnosis Date     Hypothyroidism due to Hashimoto's thyroiditis         Patient Active Problem List   Diagnosis     PMDD (premenstrual dysphoric disorder)     Menorrhagia with regular cycle     Hypothyroidism due to Hashimoto's thyroiditis     Chronic migraine without aura without status migrainosus, not intractable       Surgical history: None    Family History   Problem Relation Age of Onset     ALS Mother      Skin Cancer Father        Social history: Patient is .  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  Denies tobacco use.  Denies illicit drug use.    ROS: Positive for headache.  Specifically negative for dysphagia, imbalance, fine motor  skill difficulties, bowel/bladder dysfunction, fevers,chills, appetite changes, unexplained weight loss.   Otherwise 13 systems reviewed are negative.  Please see the patient's intake questionnaire from today for details.      OBJECTIVE:  PHYSICAL EXAMINATION:  CONSTITUTIONAL:  Vital signs as above.  No acute distress.  The patient is well nourished and well groomed.  PSYCHIATRIC:  The patient is awake, alert, oriented to person, place, time and answering questions appropriately with clear speech.    HEENT:  Normocephalic, atraumatic.  Sclera clear.    SKIN:  Skin over the face, bilateral upper extremities, and neck is clean, dry, intact without rashes.  LYMPH NODES:  No palpable or tender anterior/posterior cervical, submandibular, or supraclavicular lymph nodes.    MUSCLE STRENGTH:  5/5 strength for the bilateral shoulder abductors, elbow flexors/extensors, wrist extensors, finger flexors/abductors.  NEURO:  CN III-XII are grossly intact.  2/4 symmetric biceps, brachioradialis, triceps reflexes bilaterally.  Sensation to light touch intact bilateral upper extremities throughout.  Negative Calle's bilaterally.    VASCULAR:  2/4 radial pulses bilaterally.  Warm upper limbs bilaterally.  Capillary refill in the upper extremities is less than 1 second.  MUSCULOSKELETAL: Tender to palpation with hypertonicity bilateral upper trapezius muscles.  Cervical range of motion is full.

## 2023-02-27 ENCOUNTER — OFFICE VISIT (OUTPATIENT)
Dept: PHYSICAL MEDICINE AND REHAB | Facility: CLINIC | Age: 43
End: 2023-02-27
Payer: COMMERCIAL

## 2023-02-27 VITALS
BODY MASS INDEX: 22.39 KG/M2 | DIASTOLIC BLOOD PRESSURE: 68 MMHG | HEIGHT: 63 IN | HEART RATE: 71 BPM | WEIGHT: 126.4 LBS | SYSTOLIC BLOOD PRESSURE: 106 MMHG

## 2023-02-27 DIAGNOSIS — M79.18 MYOFASCIAL PAIN: Primary | ICD-10-CM

## 2023-02-27 DIAGNOSIS — M54.2 CERVICALGIA: ICD-10-CM

## 2023-02-27 PROCEDURE — 99204 OFFICE O/P NEW MOD 45 MIN: CPT | Performed by: PHYSICIAN ASSISTANT

## 2023-02-27 ASSESSMENT — PAIN SCALES - GENERAL: PAINLEVEL: MILD PAIN (2)

## 2023-02-27 NOTE — LETTER
2/27/2023         RE: Dinorah Mai  [address]  [place] [address]        Dear Colleague,    Thank you for referring your patient, Dinorah Mai, to the [facility]. Please see a copy of my visit note below.    ASSESSMENT: Dinorah Mai is a 43 year old female with past medical history significant for chronic migraine, hypothyroidism who presents today for new patient evaluation of chronic bilateral neck pain with radiation to the bilateral shoulders.  Patient has not had any imaging of the cervical spine.  Pain seems largely myofascial but it is unusual that her pain has not improved with conservative treatment.  She has tried Pilates/stretching, acupuncture, dry needling, chiropractic treatment, craniosacral therapy, and medications including baclofen.  She has also tried Botox for chronic migraine which involves injections into the upper trapezius muscles.  None of these modalities have provided any meaningful relief.  I would like to evaluate for possible underlying cervical spondylosis which could be triggering muscle spasm/myofascial pain.  She was neurologically intact.        PLAN:  A shared decision making model was used.  The patient's values and choices were respected.  The following represents what was discussed and decided upon by the physician assistant and the patient.      1.  DIAGNOSTIC TESTS: I ordered an MRI cervical spine for further evaluation.    2.  PHYSICAL THERAPY: No physical therapy was ordered.  She is currently doing craniosacral therapy.  We will await the results of her MRI.  We could consider additional physical therapy if needed.    3.  MEDICATIONS: No changes are made to the patient's medications.  - Patient recently started baclofen which she takes as needed and is somewhat helpful.  - Patient also takes Advil as needed.    4.  INTERVENTIONS:  No interventions were ordered.  We will await the results of her MRI.  - If this shows  spinal stenosis or foraminal stenosis we could consider an epidural steroid injection.  - If there is facet arthropathy we could consider medial branch blocks to determine if there is facet mediated pain.  - Patient gets Botox injections for chronic migraine but these have not helped with her  Operative details muscle pain.    5.  PATIENT EDUCATION: Patient is in agreement the above plan.  All questions were answered.    6.  FOLLOW-UP:   I will send the patient a [employer] message with her MRI results.  If there is significant neural compromise I would likely recommend the epidural steroid injection.  If there is significant facet arthropathy in the lower cervical region we could consider the medial branch blocks.  I will likely recommend that she return to the clinic to review the results and discuss treatment options.  If she has questions or concerns in the meantime, she should not hesitate to call.        SUBJECTIVE:  Dinorah Mai  Is a 43 year old female who presents today in consultation at request of Dr. Kennedy for new patient evaluation of neck pain.  Patient reports that she has had neck and shoulder pain for 5 to 10 years.  She denies any injury or event to cause the pain.  She also deals with chronic migraine.  She started getting Botox injections for chronic migraine 6 months ago.  This has been very helpful for her chronic migraine.  She was hopeful that the Botox injections would also help with her neck pain since they do Botox injections into the upper trapezius muscles as part of the migraine paradigm.  Unfortunately, the Botox injections did not have any meaningful benefit for her neck pain.    Patient complains of bilateral neck pain.  Pain is located in the lower cervical region and extends into the upper trapezius muscles bilaterally to the shoulders.  She denies any pain down the arms.  Both sides are equally affected.  She states that she has a constant nagging pain.  She states her muscles  feel very tight even though she is very conscientious to try to relax those muscles.  She has intermittent tingling in both hands which comes and goes.  She does not feel that this is related to her neck pain.  Denies weakness in the arms.  Pain is aggravated doing push-ups and lifting weights.  Pain is alleviated with stretching.  She denies recent fevers, chills, sweats.  Denies loss of bowel or bladder control.    Treatment to date:  - Regular Pilates/stretching  - Chiropractic treatment 5 years ago with no help  - Acupuncture with no help  - Dry needling with no help  - Sacral therapy x1 was helpful and she has another session scheduled for later this week.  - Recently started baclofen which, when taken in combination with Advil and applying IcyHot provide some relief    Current Outpatient Medications   Medication     amitriptyline (ELAVIL) 10 MG tablet     baclofen (LIORESAL) 10 MG tablet     cholecalciferol 25 MCG (1000 UT) TABS     ferrous sulfate (FEROSUL) 325 (65 Fe) MG tablet     folic acid (FOLVITE) 1 MG tablet     levothyroxine (SYNTHROID/LEVOTHROID) 75 MCG tablet     liothyronine (CYTOMEL) 5 MCG tablet     Magnesium Gluconate 550 MG TABS     naratriptan (AMERGE) 2.5 MG tablet     topiramate (TOPAMAX) 50 MG tablet     tranexamic acid (LYSTEDA) 650 MG tablet     vitamin B complex with vitamin C (STRESS TAB) tablet     Current Facility-Administered Medications   Medication     Botulinum Toxin Type A (BOTOX) 200 units injection 155 Units       No Known Allergies    Past Medical History:   Diagnosis Date     Hypothyroidism due to Hashimoto's thyroiditis         Patient Active Problem List   Diagnosis     PMDD (premenstrual dysphoric disorder)     Menorrhagia with regular cycle     Hypothyroidism due to Hashimoto's thyroiditis     Chronic migraine without aura without status migrainosus, not intractable       Surgical history: None    Family History   Problem Relation Age of Onset     ALS Mother      Skin  Cancer Father        Social history: Patient is .  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  Denies tobacco use.  Denies illicit drug use.    ROS: Positive for headache.  Specifically negative for dysphagia, imbalance, fine motor skill difficulties, bowel/bladder dysfunction, fevers,chills, appetite changes, unexplained weight loss.   Otherwise 13 systems reviewed are negative.  Please see the patient's intake questionnaire from today for details.      OBJECTIVE:  PHYSICAL EXAMINATION:  CONSTITUTIONAL:  Vital signs as above.  No acute distress.  The patient is well nourished and well groomed.  PSYCHIATRIC:  The patient is awake, alert, oriented to person, place, time and answering questions appropriately with clear speech.    HEENT:  Normocephalic, atraumatic.  Sclera clear.    SKIN:  Skin over the face, bilateral upper extremities, and neck is clean, dry, intact without rashes.  LYMPH NODES:  No palpable or tender anterior/posterior cervical, submandibular, or supraclavicular lymph nodes.    MUSCLE STRENGTH:  5/5 strength for the bilateral shoulder abductors, elbow flexors/extensors, wrist extensors, finger flexors/abductors.  NEURO:  CN III-XII are grossly intact.  2/4 symmetric biceps, brachioradialis, triceps reflexes bilaterally.  Sensation to light touch intact bilateral upper extremities throughout.  Negative Calle's bilaterally.    VASCULAR:  2/4 radial pulses bilaterally.  Warm upper limbs bilaterally.  Capillary refill in the upper extremities is less than 1 second.  MUSCULOSKELETAL: Tender to palpation with hypertonicity bilateral upper trapezius muscles.  Cervical range of motion is full.            Again, thank you for allowing me to participate in the care of your patient.        Sincerely,        Cristela Vazquez PA-C

## 2023-02-27 NOTE — PATIENT INSTRUCTIONS
[facility] Scheduling    Please call [phone] to schedule your image(s) (select option#1). There are 2 different locations, see below. You can do walk-in visits for xray only images if you want.     [facility]  [address] [place] [id number]    [address] [address]

## 2023-03-02 ENCOUNTER — ANCILLARY PROCEDURE (OUTPATIENT)
Dept: MAMMOGRAPHY | Facility: CLINIC | Age: 43
End: 2023-03-02
Attending: INTERNAL MEDICINE
Payer: COMMERCIAL

## 2023-03-02 DIAGNOSIS — Z12.31 VISIT FOR SCREENING MAMMOGRAM: ICD-10-CM

## 2023-03-02 PROCEDURE — 77067 SCR MAMMO BI INCL CAD: CPT | Mod: TC | Performed by: RADIOLOGY

## 2023-03-07 ENCOUNTER — ANCILLARY PROCEDURE (OUTPATIENT)
Dept: MRI IMAGING | Facility: CLINIC | Age: 43
End: 2023-03-07
Attending: PHYSICIAN ASSISTANT
Payer: COMMERCIAL

## 2023-03-07 DIAGNOSIS — M54.2 CERVICALGIA: ICD-10-CM

## 2023-03-07 PROCEDURE — 72141 MRI NECK SPINE W/O DYE: CPT | Mod: GC | Performed by: RADIOLOGY

## 2023-03-13 DIAGNOSIS — G43.709 CHRONIC MIGRAINE WITHOUT AURA WITHOUT STATUS MIGRAINOSUS, NOT INTRACTABLE: ICD-10-CM

## 2023-03-14 RX ORDER — AMITRIPTYLINE HYDROCHLORIDE 10 MG/1
10 TABLET ORAL AT BEDTIME
Qty: 90 TABLET | Refills: 3 | OUTPATIENT
Start: 2023-03-14

## 2023-03-14 NOTE — TELEPHONE ENCOUNTER
Refills on file, filed on 12/29/22 for 90 days and 3 refills    Germaine Park RN on 3/14/2023 at 3:53 PM

## 2023-03-16 ENCOUNTER — TELEPHONE (OUTPATIENT)
Dept: NEUROLOGY | Facility: CLINIC | Age: 43
End: 2023-03-16
Payer: COMMERCIAL

## 2023-03-16 NOTE — TELEPHONE ENCOUNTER
Pt has upcoming botox appt.  Please update regarding authorization. Thank you    Sent to [employer] team

## 2023-03-20 NOTE — TELEPHONE ENCOUNTER
Maximilian Roman,     This patient is good to go for G43.709 up to 155 units every 12 weeks.     Thank you,     Love

## 2023-03-24 ENCOUNTER — OFFICE VISIT (OUTPATIENT)
Dept: DERMATOLOGY | Facility: CLINIC | Age: 43
End: 2023-03-24
Payer: COMMERCIAL

## 2023-03-24 DIAGNOSIS — D22.9 MULTIPLE BENIGN NEVI: Primary | ICD-10-CM

## 2023-03-24 DIAGNOSIS — D18.01 CHERRY ANGIOMA: ICD-10-CM

## 2023-03-24 DIAGNOSIS — Z12.83 ENCOUNTER FOR SCREENING FOR MALIGNANT NEOPLASM OF SKIN: ICD-10-CM

## 2023-03-24 DIAGNOSIS — Z86.018 HISTORY OF DYSPLASTIC NEVUS: ICD-10-CM

## 2023-03-24 DIAGNOSIS — L81.4 LENTIGINES: ICD-10-CM

## 2023-03-24 PROCEDURE — 99213 OFFICE O/P EST LOW 20 MIN: CPT | Performed by: NURSE PRACTITIONER

## 2023-03-24 NOTE — LETTER
3/24/2023         RE: Dinorah Mai  [address] [place] [address]        Dear Colleague,    Thank you for referring your patient, Dinorah Mai, to the [facility]. Please see a copy of my visit note below.    [facility] Note  Encounter Date: Mar 24, 2023  Office Visit     Reviewed patients past medical history and pertinent chart review prior to patients visit today.     Dermatology Problem List:  severely atypical melanocytic lesion with features verging on melanoma in situ s/p excision 10/17/2022    History of actinic keratosis on the nasal tip treated with cryotherapy 8/19/2022    ____________________________________________    Assessment & Plan:     # history of DN borderline melanoma in situ.  Well-healed scar without signs of recurrent pigmentation or malignancy    # Benign skin findings including: cherry angioma, lentigines and benign nevi.   - No further intervention required. Patient to report changes.   - Patient reassured of the benign nature of these lesions.    #Signs and Symptoms of non-melanoma skin cancer and ABCDEs of melanoma reviewed with patient. Patient encouraged to perform monthly self skin exams and educated on how to perform them. UV precautions reviewed with patient. Patient was asked about new or changing moles/lesions on body.     #Reviewed Sunscreen: Apply 20 minutes prior to going outdoors and reapply every two hours, when wet or sweating. We recommend using an SPF 30 or higher, and to use one that is water resistant.       Follow-up:  6-12 months for follow up full body skin exam, prn for new or changing lesions or new concerns    Talita Finney CNP  Dermatology     ____________________________________________    CC: Skin Check (Atypical removed 2022)    HPI:  Ms. Dinorah Mai is a(n) 43 year old female who presents today as a return patient for a full body skin cancer screening.Patient has no specific concerns  today.     Patient is otherwise feeling well, without additional skin concerns.     Physical Exam:  Vitals: There were no vitals taken for this visit.  SKIN: Total skin excluding the genitalia areas was performed. The exam included the head/face, neck, both arms, chest, back, abdomen, both legs, digits, mons pubis, buttock and nails.   -Well-healed linear scar on left upper thigh. No pigment recurrence  -several 1-2mm red dome shaped symmetric papules scattered on the trunk  -multiple tan/brown flat round macules and raised papules scattered throughout trunk, extremities and head. No worrisome features for malignancy noted on examination.  -scattered tan, homogenous macules scattered on sun exposed areas of trunk, extremities and face.     - No other lesions of concern on areas examined.     Medications:  Current Outpatient Medications   Medication     amitriptyline (ELAVIL) 10 MG tablet     baclofen (LIORESAL) 10 MG tablet     cholecalciferol 25 MCG (1000 UT) TABS     ferrous sulfate (FEROSUL) 325 (65 Fe) MG tablet     folic acid (FOLVITE) 1 MG tablet     levothyroxine (SYNTHROID/LEVOTHROID) 75 MCG tablet     liothyronine (CYTOMEL) 5 MCG tablet     Magnesium Gluconate 550 MG TABS     naratriptan (AMERGE) 2.5 MG tablet     tranexamic acid (LYSTEDA) 650 MG tablet     topiramate (TOPAMAX) 50 MG tablet     vitamin B complex with vitamin C (STRESS TAB) tablet     Current Facility-Administered Medications   Medication     Botulinum Toxin Type A (BOTOX) 200 units injection 155 Units      Past Medical History:   Patient Active Problem List   Diagnosis     PMDD (premenstrual dysphoric disorder)     Menorrhagia with regular cycle     Hypothyroidism due to Hashimoto's thyroiditis     Chronic migraine without aura without status migrainosus, not intractable     Past Medical History:   Diagnosis Date     Hypothyroidism due to Hashimoto's thyroiditis        CC No referring provider defined for this encounter. on close of this  encounter.      Again, thank you for allowing me to participate in the care of your patient.        Sincerely,        HUGH Franco CNP

## 2023-03-24 NOTE — PROGRESS NOTES
[facility] Dermatology Note  Encounter Date: Mar 24, 2023  Office Visit     Reviewed patients past medical history and pertinent chart review prior to patients visit today.     Dermatology Problem List:  severely atypical melanocytic lesion with features verging on melanoma in situ s/p excision 10/17/2022    History of actinic keratosis on the nasal tip treated with cryotherapy 8/19/2022    ____________________________________________    Assessment & Plan:     # history of DN borderline melanoma in situ.  Well-healed scar without signs of recurrent pigmentation or malignancy    # Benign skin findings including: cherry angioma, lentigines and benign nevi.   - No further intervention required. Patient to report changes.   - Patient reassured of the benign nature of these lesions.    #Signs and Symptoms of non-melanoma skin cancer and ABCDEs of melanoma reviewed with patient. Patient encouraged to perform monthly self skin exams and educated on how to perform them. UV precautions reviewed with patient. Patient was asked about new or changing moles/lesions on body.     #Reviewed Sunscreen: Apply 20 minutes prior to going outdoors and reapply every two hours, when wet or sweating. We recommend using an SPF 30 or higher, and to use one that is water resistant.       Follow-up:  6-12 months for follow up full body skin exam, prn for new or changing lesions or new concerns    Talita Finney CNP  Dermatology     ____________________________________________    CC: Skin Check (Atypical removed 2022)    HPI:  Ms. Dinorah Mai is a(n) 43 year old female who presents today as a return patient for a full body skin cancer screening.Patient has no specific concerns today.     Patient is otherwise feeling well, without additional skin concerns.     Physical Exam:  Vitals: There were no vitals taken for this visit.  SKIN: Total skin excluding the genitalia areas was performed. The exam included the head/face, neck,  both arms, chest, back, abdomen, both legs, digits, mons pubis, buttock and nails.   -Well-healed linear scar on left upper thigh. No pigment recurrence  -several 1-2mm red dome shaped symmetric papules scattered on the trunk  -multiple tan/brown flat round macules and raised papules scattered throughout trunk, extremities and head. No worrisome features for malignancy noted on examination.  -scattered tan, homogenous macules scattered on sun exposed areas of trunk, extremities and face.     - No other lesions of concern on areas examined.     Medications:  Current Outpatient Medications   Medication     amitriptyline (ELAVIL) 10 MG tablet     baclofen (LIORESAL) 10 MG tablet     cholecalciferol 25 MCG (1000 UT) TABS     ferrous sulfate (FEROSUL) 325 (65 Fe) MG tablet     folic acid (FOLVITE) 1 MG tablet     levothyroxine (SYNTHROID/LEVOTHROID) 75 MCG tablet     liothyronine (CYTOMEL) 5 MCG tablet     Magnesium Gluconate 550 MG TABS     naratriptan (AMERGE) 2.5 MG tablet     tranexamic acid (LYSTEDA) 650 MG tablet     topiramate (TOPAMAX) 50 MG tablet     vitamin B complex with vitamin C (STRESS TAB) tablet     Current Facility-Administered Medications   Medication     Botulinum Toxin Type A (BOTOX) 200 units injection 155 Units      Past Medical History:   Patient Active Problem List   Diagnosis     PMDD (premenstrual dysphoric disorder)     Menorrhagia with regular cycle     Hypothyroidism due to Hashimoto's thyroiditis     Chronic migraine without aura without status migrainosus, not intractable     Past Medical History:   Diagnosis Date     Hypothyroidism due to Hashimoto's thyroiditis        CC No referring provider defined for this encounter. on close of this encounter.

## 2023-03-30 ENCOUNTER — OFFICE VISIT (OUTPATIENT)
Dept: NEUROLOGY | Facility: CLINIC | Age: 43
End: 2023-03-30
Payer: COMMERCIAL

## 2023-03-30 DIAGNOSIS — G43.709 CHRONIC MIGRAINE WITHOUT AURA WITHOUT STATUS MIGRAINOSUS, NOT INTRACTABLE: Primary | ICD-10-CM

## 2023-03-30 PROCEDURE — 64615 CHEMODENERV MUSC MIGRAINE: CPT | Performed by: NURSE PRACTITIONER

## 2023-03-30 PROCEDURE — 99207 PR SATISFY VISIT NUMBER: CPT | Performed by: NURSE PRACTITIONER

## 2023-03-30 NOTE — PROGRESS NOTES
BOTULINUM NEUROTOXIN INJECTION PROCEDURE:  DATA:3/30/2023  INDICATIONS FOR PROCEDURES:   chronic migraine headaches.  baseline symptoms have been recalcitrant to oral medications and conservative therapy.  Since Botox on 12/29/2022--a significant improvement that patient stopped topiramate and only on Botox and amitriptyline as migraines reduced.    Patient is here today for a repeat  injection with Botox.     GOAL OF PROCEDURE:  The goal of this procedure is to decrease pain and enhance functional independence.     TOTAL DOSE ADMINISTERED:  Dose Administered:  155 units  Botox (Botulinum Toxin Type A)       2:1 Dilution    Diluent Used:  Preservative Free Normal Saline  Wasted 45 units  See MAR   Medication guide was offered to patient      CONSENT:  The risks, benefits, and treatment options were discussed with the patient who agreed to proceed.     Written consent was obtained      EQUIPMENT USED:  Needles-30 gauge, 0.5 inches for injections  Four 1-ml tuberculin syringes for injections  One sodium chloride 10 ml vial preservative free  Alcohol swabs     SKIN PREPARATION:  Skin preparation was performed using an alcohol wipe.        AREA/MUSCLE INJECTED:  155 units of Botox     Right upper Trapezius (upper cervical) - 5 units of Botox at 3 site/s.   Left upper Trapezius (upper cervical) - 5 units of Botox at 3 site/s.      Right cervical paraspinals - 5 units of Botox at 2 site/s.   Left cervical paraspinals - 5 units of Botox at 2 site/s.      Left occipitalis - 5 units of Botox at 3 site/s.  Right occipitalis -5 units of Botox at 3 site/s     Right Frontalis - 5 units of Botox at 2 site/s.  Left Frontalis - 5 units of Botox at 2 site/s.     Right Temporalis - 5 units of Botox at 4 site/s.  Left Temporalis - 5 units of Botox at 4 site/s.     Right  - 5 units of Botox at 1 site/s.              Left  - 5 units of Botox at 1 site/s.     Procerus - 5 units of Botox at 1 site/s.     RESPONSE TO  PROCEDURE:  tolerated the procedure well and there were no immediate complications.  Patient was allowed to recover for an appropriate period of time and was discharged home in stable condition.     FOLLOW UP:     Repeat Botox injections in 12 weeks        This procedure was performed under a hospital privileging agreement with HUGH Carey, [facility]

## 2023-03-30 NOTE — LETTER
3/30/2023       RE: Dinorah Mai  [address] [place] [facility] [address]     Dear Colleague,    Thank you for referring your patient, Dinorah Mai, to the [facility]IN[facility] [place] at [facility]. Please see a copy of my visit note below.    BOTULINUM NEUROTOXIN INJECTION PROCEDURE:  DATA:3/30/2023  INDICATIONS FOR PROCEDURES:   chronic migraine headaches.  baseline symptoms have been recalcitrant to oral medications and conservative therapy.  Since Botox on 12/29/2022--a significant improvement that patient stopped topiramate and only on Botox and amitriptyline as migraines reduced.    Patient is here today for a repeat  injection with Botox.     GOAL OF PROCEDURE:  The goal of this procedure is to decrease pain and enhance functional independence.     TOTAL DOSE ADMINISTERED:  Dose Administered:  155 units  Botox (Botulinum Toxin Type A)       2:1 Dilution    Diluent Used:  Preservative Free Normal Saline  Wasted 45 units  See MAR   Medication guide was offered to patient      CONSENT:  The risks, benefits, and treatment options were discussed with the patient who agreed to proceed.     Written consent was obtained      EQUIPMENT USED:  Needles-30 gauge, 0.5 inches for injections  Four 1-ml tuberculin syringes for injections  One sodium chloride 10 ml vial preservative free  Alcohol swabs     SKIN PREPARATION:  Skin preparation was performed using an alcohol wipe.        AREA/MUSCLE INJECTED:  155 units of Botox     Right upper Trapezius (upper cervical) - 5 units of Botox at 3 site/s.   Left upper Trapezius (upper cervical) - 5 units of Botox at 3 site/s.      Right cervical paraspinals - 5 units of Botox at 2 site/s.   Left cervical paraspinals - 5 units of Botox at 2 site/s.      Left occipitalis - 5 units of Botox at 3 site/s.  Right occipitalis -5 units of Botox at 3 site/s     Right Frontalis - 5 units of Botox at 2 site/s.  Left  Frontalis - 5 units of Botox at 2 site/s.     Right Temporalis - 5 units of Botox at 4 site/s.  Left Temporalis - 5 units of Botox at 4 site/s.     Right  - 5 units of Botox at 1 site/s.              Left  - 5 units of Botox at 1 site/s.     Procerus - 5 units of Botox at 1 site/s.     RESPONSE TO PROCEDURE:  tolerated the procedure well and there were no immediate complications.  Patient was allowed to recover for an appropriate period of time and was discharged home in stable condition.     FOLLOW UP:     Repeat Botox injections in 12 weeks        This procedure was performed under a hospital privileging agreement with Dr. Tapia           Again, thank you for allowing me to participate in the care of your patient.      Sincerely,    HUGH Wright CNP

## 2023-04-21 DIAGNOSIS — G43.709 CHRONIC MIGRAINE WITHOUT AURA WITHOUT STATUS MIGRAINOSUS, NOT INTRACTABLE: ICD-10-CM

## 2023-04-21 RX ORDER — FOLIC ACID 1 MG/1
TABLET ORAL
Qty: 90 TABLET | Refills: 3 | Status: SHIPPED | OUTPATIENT
Start: 2023-04-21 | End: 2024-04-15

## 2023-04-21 NOTE — TELEPHONE ENCOUNTER
"Routing refill request to provider for review/approval because:  Drug not active on patient's medication list    Last Written Prescription Date:  3/29/22  Last Fill Quantity: 90,  # refills: 3   Last office visit provider:   1/20/23 with Brent    Requested Prescriptions   Pending Prescriptions Disp Refills     folic acid (FOLVITE) 1 MG tablet [Pharmacy Med Name: FOLIC ACID 1MG TABLETS] 90 tablet 3     Sig: TAKE 1 TABLET(1 MG) BY MOUTH DAILY       Vitamin Supplements (Adult) Protocol Failed - 4/21/2023  2:55 PM        Failed - Medication is active on med list        Passed - High dose Vitamin D not ordered        Passed - Recent (12 mo) or future (30 days) visit within the authorizing provider's specialty     Patient has had an office visit with the authorizing provider or a provider within the authorizing providers department within the previous 12 mos or has a future within next 30 days. See \"Patient Info\" tab in inbasket, or \"Choose Columns\" in Meds & Orders section of the refill encounter.                   KRISTIAN KOLB RN 04/21/23 2:55 PM  "

## 2023-06-29 ENCOUNTER — OFFICE VISIT (OUTPATIENT)
Dept: NEUROLOGY | Facility: CLINIC | Age: 43
End: 2023-06-29
Payer: COMMERCIAL

## 2023-06-29 DIAGNOSIS — G43.709 CHRONIC MIGRAINE WITHOUT AURA WITHOUT STATUS MIGRAINOSUS, NOT INTRACTABLE: Primary | ICD-10-CM

## 2023-06-29 PROCEDURE — 99207 PR SATISFY VISIT NUMBER: CPT | Performed by: NURSE PRACTITIONER

## 2023-06-29 PROCEDURE — 64615 CHEMODENERV MUSC MIGRAINE: CPT | Performed by: NURSE PRACTITIONER

## 2023-06-29 NOTE — PROGRESS NOTES
BOTULINUM NEUROTOXIN INJECTION PROCEDURE:  DATA:6/29/2023  INDICATIONS FOR PROCEDURES:   chronic migraine headaches.  baseline symptoms have been recalcitrant to oral medications and conservative therapy.  Since Botox on 3/30/2023--a significant improvement that patient stopped topiramate and only on Botox and amitriptyline as migraines reduced. Botox is the best ever treatment and no side effects. About 1-2/months migraines and respond to treatment    Patient is here today for a repeat  injection with Botox.     GOAL OF PROCEDURE:  The goal of this procedure is to decrease pain and enhance functional independence.     TOTAL DOSE ADMINISTERED:  Dose Administered:  155 units  Botox (Botulinum Toxin Type A)       2:1 Dilution    Diluent Used:  Preservative Free Normal Saline  Wasted 45 units  See MAR    CONSENT:  The risks, benefits, and treatment options were discussed with the patient who agreed to proceed.     Written consent was obtained      EQUIPMENT USED:  Needles-30 gauge, 0.5 inches for injections  Four 1-ml tuberculin syringes for injections  One sodium chloride 10 ml vial preservative free  Alcohol swabs     SKIN PREPARATION:  Skin preparation was performed using an alcohol wipe.        AREA/MUSCLE INJECTED:  155 units of Botox     Right upper Trapezius (upper cervical) - 5 units of Botox at 3 site/s.   Left upper Trapezius (upper cervical) - 5 units of Botox at 3 site/s.      Right cervical paraspinals - 5 units of Botox at 2 site/s.   Left cervical paraspinals - 5 units of Botox at 2 site/s.      Left occipitalis - 5 units of Botox at 3 site/s.  Right occipitalis -5 units of Botox at 3 site/s     Right Frontalis - 5 units of Botox at 2 site/s.  Left Frontalis - 5 units of Botox at 2 site/s.     Right Temporalis - 5 units of Botox at 4 site/s.  Left Temporalis - 5 units of Botox at 4 site/s.     Right  - 5 units of Botox at 1 site/s.              Left  - 5 units of Botox at 1  site/s.     Procerus - 5 units of Botox at 1 site/s.     RESPONSE TO PROCEDURE:  tolerated the procedure well and there were no immediate complications.  Patient was allowed to recover for an appropriate period of time and was discharged home in stable condition.     FOLLOW UP:     Repeat Botox injections in 12 weeks        This procedure was performed under a hospital privileging agreement with Dr. Willie Dyer, HUGH, [facility]

## 2023-06-29 NOTE — LETTER
6/29/2023       RE: Dinorah Mai  [address] [place] [address]     Dear Colleague,    Thank you for referring your patient, Dinorah Mai, to the [facility] [place] at [facility]. Please see a copy of my visit note below.    BOTULINUM NEUROTOXIN INJECTION PROCEDURE:  DATA:6/29/2023  INDICATIONS FOR PROCEDURES:   chronic migraine headaches.  baseline symptoms have been recalcitrant to oral medications and conservative therapy.  Since Botox on 3/30/2023--a significant improvement that patient stopped topiramate and only on Botox and amitriptyline as migraines reduced. Botox is the best ever treatment and no side effects. About 1-2/months migraines and respond to treatment    Patient is here today for a repeat  injection with Botox.     GOAL OF PROCEDURE:  The goal of this procedure is to decrease pain and enhance functional independence.     TOTAL DOSE ADMINISTERED:  Dose Administered:  155 units  Botox (Botulinum Toxin Type A)       2:1 Dilution    Diluent Used:  Preservative Free Normal Saline  Wasted 45 units  See MAR    CONSENT:  The risks, benefits, and treatment options were discussed with the patient who agreed to proceed.     Written consent was obtained      EQUIPMENT USED:  Needles-30 gauge, 0.5 inches for injections  Four 1-ml tuberculin syringes for injections  One sodium chloride 10 ml vial preservative free  Alcohol swabs     SKIN PREPARATION:  Skin preparation was performed using an alcohol wipe.        AREA/MUSCLE INJECTED:  155 units of Botox     Right upper Trapezius (upper cervical) - 5 units of Botox at 3 site/s.   Left upper Trapezius (upper cervical) - 5 units of Botox at 3 site/s.      Right cervical paraspinals - 5 units of Botox at 2 site/s.   Left cervical paraspinals - 5 units of Botox at 2 site/s.      Left occipitalis - 5 units of Botox at 3 site/s.  Right occipitalis -5 units of Botox at 3 site/s      Right Frontalis - 5 units of Botox at 2 site/s.  Left Frontalis - 5 units of Botox at 2 site/s.     Right Temporalis - 5 units of Botox at 4 site/s.  Left Temporalis - 5 units of Botox at 4 site/s.     Right  - 5 units of Botox at 1 site/s.              Left  - 5 units of Botox at 1 site/s.     Procerus - 5 units of Botox at 1 site/s.     RESPONSE TO PROCEDURE:  tolerated the procedure well and there were no immediate complications.  Patient was allowed to recover for an appropriate period of time and was discharged home in stable condition.     FOLLOW UP:     Repeat Botox injections in 12 weeks        This procedure was performed under a hospital privileging agreement with Dr. Tapia            Again, thank you for allowing me to participate in the care of your patient.      Sincerely,    HUGH Wright CNP

## 2023-09-01 DIAGNOSIS — G43.709 CHRONIC MIGRAINE WITHOUT AURA WITHOUT STATUS MIGRAINOSUS, NOT INTRACTABLE: Primary | ICD-10-CM

## 2023-09-03 ENCOUNTER — MYC MEDICAL ADVICE (OUTPATIENT)
Dept: ENDOCRINOLOGY | Facility: CLINIC | Age: 43
End: 2023-09-03
Payer: COMMERCIAL

## 2023-09-03 DIAGNOSIS — E03.9 HYPOTHYROIDISM, UNSPECIFIED TYPE: Primary | ICD-10-CM

## 2023-09-03 DIAGNOSIS — E06.3 HYPOTHYROIDISM DUE TO HASHIMOTO'S THYROIDITIS: ICD-10-CM

## 2023-09-06 ENCOUNTER — OFFICE VISIT (OUTPATIENT)
Dept: ENDOCRINOLOGY | Facility: CLINIC | Age: 43
End: 2023-09-06
Payer: COMMERCIAL

## 2023-09-06 ENCOUNTER — LAB (OUTPATIENT)
Dept: LAB | Facility: CLINIC | Age: 43
End: 2023-09-06
Payer: COMMERCIAL

## 2023-09-06 VITALS
BODY MASS INDEX: 23.31 KG/M2 | WEIGHT: 131.6 LBS | HEART RATE: 81 BPM | SYSTOLIC BLOOD PRESSURE: 105 MMHG | DIASTOLIC BLOOD PRESSURE: 69 MMHG

## 2023-09-06 DIAGNOSIS — E03.9 HYPOTHYROIDISM, UNSPECIFIED TYPE: ICD-10-CM

## 2023-09-06 DIAGNOSIS — E06.3 HYPOTHYROIDISM DUE TO HASHIMOTO'S THYROIDITIS: ICD-10-CM

## 2023-09-06 DIAGNOSIS — E06.3 HYPOTHYROIDISM DUE TO HASHIMOTO'S THYROIDITIS: Primary | ICD-10-CM

## 2023-09-06 LAB
T3 SERPL-MCNC: 99 NG/DL (ref 85–202)
T4 FREE SERPL-MCNC: 1.09 NG/DL (ref 0.9–1.7)
TSH SERPL DL<=0.005 MIU/L-ACNC: 2.45 UIU/ML (ref 0.3–4.2)

## 2023-09-06 PROCEDURE — 84480 ASSAY TRIIODOTHYRONINE (T3): CPT

## 2023-09-06 PROCEDURE — 84439 ASSAY OF FREE THYROXINE: CPT

## 2023-09-06 PROCEDURE — 84443 ASSAY THYROID STIM HORMONE: CPT

## 2023-09-06 PROCEDURE — 99213 OFFICE O/P EST LOW 20 MIN: CPT | Performed by: INTERNAL MEDICINE

## 2023-09-06 PROCEDURE — 36415 COLL VENOUS BLD VENIPUNCTURE: CPT

## 2023-09-06 RX ORDER — LIOTHYRONINE SODIUM 5 UG/1
TABLET ORAL
Qty: 90 TABLET | Refills: 3 | Status: SHIPPED | OUTPATIENT
Start: 2023-09-06 | End: 2024-09-25

## 2023-09-06 RX ORDER — LEVOTHYROXINE SODIUM 75 UG/1
75 TABLET ORAL DAILY
Qty: 90 TABLET | Refills: 3 | Status: SHIPPED | OUTPATIENT
Start: 2023-09-06 | End: 2023-10-16

## 2023-09-06 NOTE — PROGRESS NOTES
Dinorah Mai is a 43 year old yo female who presents today for follow-up of Hashimoto's Hypothyroidism, and thyroid nodule. She also has PMHx of chronic migraine, menorrhagia. Last seen by me Sept 2022    Chief Complaint   Patient presents with    RECHECK     Hypothyroidism due to Hashimoto's thyroiditis +1 more       1) Hashimoto's Hypothyroidism  Diagnosis 10 years ago had delivered second daughter and was unable to nurse. Attributes this as the start of her thyroid issues. A few years later, was having, headaches, migraines, weight gain, hair loss, fatigue and checked thyroid function and found to have hypothyroidism. Diagnosed/treated in [place]. Endo there also completed ultrasound and noted solitary subcentimeter nodule that was monitored every few years    Treatment Levothyroxine 75mcg daily, Liothyronine 5mcg    Imaging thyroid ultrasound- last 5/2020, small subcentimeter nodule that per report was not visualized prior to this.    Thyroid ROS INTERVAL:  - Weight gain over past 6 months-- 5-10lbs  - Fatigue, fluctuating constipation,   Some temperature intolerance (may be weather related)  - Headache worsening (now getting botox, but this has improved)  - Takes levothyroxine in middle of the night when wakes up  - Hair thinning-- does not notice widening of part, but does notice dryness of hairline, some itchy skin all over     2) Solitary Thyroid Nodule  Seen on thyroid ultrasound 5/2020, not previously identified  Last ultrasound 9/2022- TIRADS 3 subcentimeter L side, remaining heterogenous thyroid  Denies any difficulty breathing, difficulty swallowing, shortness of breath, enlargement of neck/thyroid, family history of thyroid cancer, exposure to radiation, hoarseness.      Active diagnoses this visit:     Hypothyroidism, unspecified type  Hypothyroidism due to Hashimoto's thyroiditis     ROS: 10 point ROS neg other than the symptoms noted above in the HPI.      Medical, surgical, social, and  family histories, medications and allergies reviewed and updated.  Past Medical History:   Diagnosis Date    Hypothyroidism due to Hashimoto's thyroiditis        No past surgical history on file.    Allergies:  Patient has no known allergies.    Social History     Tobacco Use    Smoking status: Never    Smokeless tobacco: Never   Substance Use Topics    Alcohol use: Yes     Comment: 2 drinks a week        Family History   Problem Relation Age of Onset    ALS Mother     Skin Cancer Father          Objective:  /69 (BP Location: Left arm, Patient Position: Sitting, Cuff Size: Adult Regular)   Pulse 81   Wt 59.7 kg (131 lb 9.6 oz)   BMI 23.31 kg/m      Exam:  Constitutional: healthy, alert, no acute distress  Head: Normocephalic. No masses, lesions, no exophthalmos/proptosis  ENT: palpable L thyroid nodule small, center, no cervical lymph nodes  Respiratory: nonlabored  Gastrointestinal: Abdomen soft, non-tender.  Musculoskeletal: extremities normal- no gross deformities noted, gait normal and normal muscle tone  Skin: no suspicious lesions or rashes  Neurologic: Gait normal. sensation grossly intact  Psychiatric: mentation appears normal, calm      Lab Results   Component Value Date/Time    TSH 2.63 02/07/2023 10:54 AM    TSH 2.23 09/20/2022 01:34 PM    T4 1.30 02/07/2023 10:54 AM    ESTROGEN 54 04/07/2021 11:43 AM    FSH 5.1 04/07/2021 11:43 AM     Last Comprehensive Metabolic Panel:  Sodium   Date Value Ref Range Status   04/07/2021 141 136 - 145 mmol/L Final     Potassium   Date Value Ref Range Status   04/07/2021 4.3 3.5 - 5.0 mmol/L Final     Chloride   Date Value Ref Range Status   04/07/2021 104 98 - 107 mmol/L Final     Carbon Dioxide (CO2)   Date Value Ref Range Status   04/07/2021 26 22 - 31 mmol/L Final     Anion Gap   Date Value Ref Range Status   04/07/2021 11 5 - 18 mmol/L Final     Glucose   Date Value Ref Range Status   04/07/2021 92 70 - 125 mg/dL Final     Urea Nitrogen   Date Value Ref Range  Status   04/07/2021 11 8 - 22 mg/dL Final     Creatinine   Date Value Ref Range Status   04/07/2021 0.77 0.60 - 1.10 mg/dL Final     GFR Estimate   Date Value Ref Range Status   04/07/2021 >60 >60 mL/min/1.73m2 Final     Calcium   Date Value Ref Range Status   04/07/2021 9.6 8.5 - 10.5 mg/dL Final       Thyroid ultrasound 5/27/2020:      ASSESSMENT / PLAN:      1) Hashimoto's Thyroiditis  - Recheck TSH, T4, TT3 at this ttime  - Continue current dose levothyroxine 75 mcg and liothyronine 5mcg daily      2) Solitary Thyroid Nodule  - Recheck thyroid ultrasound 2022--> Next 2024, 2026          Orders Placed This Encounter   Procedures    US Thyroid    T4, free    TSH         RTC 1 year    A total of 20 minutes were spent today 09/06/23 on this visit including chart review, history and counseling, documentation and other activities as detailed above.

## 2023-09-06 NOTE — NURSING NOTE
Chief Complaint   Patient presents with    RECHECK     Hypothyroidism due to Hashimoto's thyroiditis +1 more       Vitals:    09/06/23 1502   BP: 105/69   BP Location: Left arm   Patient Position: Sitting   Cuff Size: Adult Regular   Pulse: 81   Weight: 59.7 kg (131 lb 9.6 oz)       Body mass index is 23.31 kg/m .    Reginaldo Gilmore MA

## 2023-09-06 NOTE — LETTER
9/6/2023         RE: Dinorah Mai  [address]  [place] [address]        Dear Colleague,    Thank you for referring your patient, Dinorah Mai, to the [facility] [place]. Please see a copy of my visit note below.    Dinorah Mai is a 43 year old yo female who presents today for follow-up of Hashimoto's Hypothyroidism, and thyroid nodule. She also has PMHx of chronic migraine, menorrhagia. Last seen by me Sept 2022    Chief Complaint   Patient presents with     RECHECK     Hypothyroidism due to Hashimoto's thyroiditis +1 more       1) Hashimoto's Hypothyroidism  Diagnosis 10 years ago had delivered second daughter and was unable to nurse. Attributes this as the start of her thyroid issues. A few years later, was having, headaches, migraines, weight gain, hair loss, fatigue and checked thyroid function and found to have hypothyroidism. Diagnosed/treated in [place]. Endo there also completed ultrasound and noted solitary subcentimeter nodule that was monitored every few years    Treatment Levothyroxine 75mcg daily, Liothyronine 5mcg    Imaging thyroid ultrasound- last 5/2020, small subcentimeter nodule that per report was not visualized prior to this.    Thyroid ROS INTERVAL:  - Weight gain over past 6 months-- 5-10lbs  - Fatigue, fluctuating constipation,   Some temperature intolerance (may be weather related)  - Headache worsening (now getting botox, but this has improved)  - Takes levothyroxine in middle of the night when wakes up  - Hair thinning-- does not notice widening of part, but does notice dryness of hairline, some itchy skin all over     2) Solitary Thyroid Nodule  Seen on thyroid ultrasound 5/2020, not previously identified  Last ultrasound 9/2022- TIRADS 3 subcentimeter L side, remaining heterogenous thyroid  Denies any difficulty breathing, difficulty swallowing, shortness of breath, enlargement of neck/thyroid, family history of thyroid cancer, exposure  to radiation, hoarseness.      Active diagnoses this visit:     Hypothyroidism, unspecified type  Hypothyroidism due to Hashimoto's thyroiditis     ROS: 10 point ROS neg other than the symptoms noted above in the HPI.      Medical, surgical, social, and family histories, medications and allergies reviewed and updated.  Past Medical History:   Diagnosis Date     Hypothyroidism due to Hashimoto's thyroiditis        No past surgical history on file.    Allergies:  Patient has no known allergies.    Social History     Tobacco Use     Smoking status: Never     Smokeless tobacco: Never   Substance Use Topics     Alcohol use: Yes     Comment: 2 drinks a week        Family History   Problem Relation Age of Onset     ALS Mother      Skin Cancer Father          Objective:  /69 (BP Location: Left arm, Patient Position: Sitting, Cuff Size: Adult Regular)   Pulse 81   Wt 59.7 kg (131 lb 9.6 oz)   BMI 23.31 kg/m      Exam:  Constitutional: healthy, alert, no acute distress  Head: Normocephalic. No masses, lesions, no exophthalmos/proptosis  ENT: palpable L thyroid nodule small, center, no cervical lymph nodes  Respiratory: nonlabored  Gastrointestinal: Abdomen soft, non-tender.  Musculoskeletal: extremities normal- no gross deformities noted, gait normal and normal muscle tone  Skin: no suspicious lesions or rashes  Neurologic: Gait normal. sensation grossly intact  Psychiatric: mentation appears normal, calm      Lab Results   Component Value Date/Time    TSH 2.63 02/07/2023 10:54 AM    TSH 2.23 09/20/2022 01:34 PM    T4 1.30 02/07/2023 10:54 AM    ESTROGEN 54 04/07/2021 11:43 AM    FSH 5.1 04/07/2021 11:43 AM     Last Comprehensive Metabolic Panel:  Sodium   Date Value Ref Range Status   04/07/2021 141 136 - 145 mmol/L Final     Potassium   Date Value Ref Range Status   04/07/2021 4.3 3.5 - 5.0 mmol/L Final     Chloride   Date Value Ref Range Status   04/07/2021 104 98 - 107 mmol/L Final     Carbon Dioxide (CO2)   Date  Value Ref Range Status   04/07/2021 26 22 - 31 mmol/L Final     Anion Gap   Date Value Ref Range Status   04/07/2021 11 5 - 18 mmol/L Final     Glucose   Date Value Ref Range Status   04/07/2021 92 70 - 125 mg/dL Final     Urea Nitrogen   Date Value Ref Range Status   04/07/2021 11 8 - 22 mg/dL Final     Creatinine   Date Value Ref Range Status   04/07/2021 0.77 0.60 - 1.10 mg/dL Final     GFR Estimate   Date Value Ref Range Status   04/07/2021 >60 >60 mL/min/1.73m2 Final     Calcium   Date Value Ref Range Status   04/07/2021 9.6 8.5 - 10.5 mg/dL Final       Thyroid ultrasound 5/27/2020:      ASSESSMENT / PLAN:      1) Hashimoto's Thyroiditis  - Recheck TSH, T4, TT3 at this ttime  - Continue current dose levothyroxine 75 mcg and liothyronine 5mcg daily      2) Solitary Thyroid Nodule  - Recheck thyroid ultrasound 2022--> Next 2024, 2026          Orders Placed This Encounter   Procedures     US Thyroid     T4, free     TSH         RTC 1 year    A total of 20 minutes were spent today 09/06/23 on this visit including chart review, history and counseling, documentation and other activities as detailed above.       Again, thank you for allowing me to participate in the care of your patient.        Sincerely,        Haylie Her MD

## 2023-09-07 NOTE — RESULT ENCOUNTER NOTE
Hello -    Here are my comments about the recent results. Overall your levels are exactly the same as they were 7 months ago-- no need to change, but if you'd like to try increasing by one extra tablet a week (75mcg 8 tablets weekly, one day take two tablets) and see if you feel any better? If you do, recheck levels 4-6 weeks after making that dose change and I'll update your prescription after that lab    Please let us know if you have any questions or concerns.    Regards,  Haylie Her MD

## 2023-09-21 ENCOUNTER — OFFICE VISIT (OUTPATIENT)
Dept: NEUROLOGY | Facility: CLINIC | Age: 43
End: 2023-09-21
Payer: COMMERCIAL

## 2023-09-21 DIAGNOSIS — G43.709 CHRONIC MIGRAINE WITHOUT AURA WITHOUT STATUS MIGRAINOSUS, NOT INTRACTABLE: Primary | ICD-10-CM

## 2023-09-21 PROCEDURE — 64615 CHEMODENERV MUSC MIGRAINE: CPT | Performed by: NURSE PRACTITIONER

## 2023-09-21 RX ORDER — TOPIRAMATE 25 MG/1
TABLET, FILM COATED ORAL
Qty: 60 TABLET | Refills: 6 | Status: SHIPPED | OUTPATIENT
Start: 2023-09-21 | End: 2023-10-02

## 2023-09-21 NOTE — LETTER
"9/21/2023       RE: Dinorah Mai  [address]  [place] [address]     Dear Colleague,    Thank you for referring your patient, Dinorah Mai, to the [facility] [place] at [facility]. Please see a copy of my visit note below.    PROCEDURE NOTE:    [facility]  Botulinum Toxin Procedure    Headache Neurology    September 21, 2023    Procedure:  OnabotulinumtoxinA injections for chronic migraine  Indication:  Chronic migraine    Dinorah suffers from severe intractable headaches.  She was referred for onabotulinumtoxinA injections for headache.  Risks, benefits, and alternatives were discussed.  All questions were answered and consent given.  She decided to proceed with the injections.     Headache history, from initial consult note: \"9/14/2022 note\".    Prior to initiation of botulinum toxin injections, Ms. Mai reported 15 headache days per month, with 15 severe headache days per month. Her headaches are quite disabling and often interfere with her ability to function normally.    Date of last injections: 6/29/2023    Ms. Mai reports 5-10 headache days per month currently, with 5 severe migraines headache days per month.  She has noticed a wearing off phenomenon prior to this round of botulinum toxin injections, lasting 3 weeks.    Botulinum toxin injections have improved their functioning: lessen migraines a lot an before at least 15/month and now may be 5/month. Can work, exercise and be with her kids, more traveling    She has attempted other migraine prophylactic treatments in the past, which have included: amitriptyline, topiramate, naratriptan, rizatriptan, sumatriptan    She currently takes amitriptyline, topiramate, naratriptan for headache prevention.    Ms. Mai's pain was assessed prior to the procedure.  She rated her pain today as 3 out of 10.    Procedural Pause: Procedural pause was conducted to " verify correct patient identity, procedure to be performed, correct side and site, correct patient position, and special requirements. Appropriate hand hygiene was utilized, and each injection site was prepped with alcohol wipe or Chloraprep swab.     Procedure Details: 200 units of onabotulinumtoxinA was diluted in 4 mL 0.9% normal saline. A total of 155 units of onabotulinumtoxinA were injected using 30 gauge 0.5 in needles into the muscles listed below. 45 units of onabotulinumtoxinA were wasted.       EQUIPMENT USED:  Needles-30 gauge, 0.5 inches for injections  Four 1-ml tuberculin syringes for injections  One sodium chloride 10 ml vial preservative free  Alcohol swabs    SKIN PREPARATION:  Skin preparation was performed using an alcohol wipe.      AREA/MUSCLE INJECTED:  155 units of Botox  Right upper Trapezius (upper cervical) - 5 units of Botox at 3 site/s.   Left upper Trapezius (upper cervical) - 5 units of Botox at 3 site/s.     Right cervical paraspinals - 5 units of Botox at 2 site/s.   Left cervical paraspinals - 5 units of Botox at 2 site/s.     Left occipitalis - 5 units of Botox at 3 site/s.  Right occipitalis -5 units of Botox at 3 site/s    Right Frontalis - 5 units of Botox at 2 site/s.  Left Frontalis - 5 units of Botox at 2 site/s.    Right Temporalis - 5 units of Botox at 4 site/s.  Left Temporalis - 5 units of Botox at 4 site/s.    Right  - 5 units of Botox at 1 site/s.              Left  - 5 units of Botox at 1 site/s.    Procerus - 5 units of Botox at 1 site/s.    RESPONSE TO PROCEDURE:  tolerated the procedure well and there were no immediate complications.  Patient was allowed to recover for an appropriate period of time and was discharged home in stable condition.    FOLLOW UP:    Repeat Botox injections in 12 weeks      This procedure was performed under a hospital privileging agreement with Dr. Tapia          Again, thank you for allowing me to participate in the  care of your patient.      Sincerely,    HUGH Wright CNP

## 2023-09-21 NOTE — PROGRESS NOTES
"PROCEDURE NOTE:    [facility]  Botulinum Toxin Procedure    Headache Neurology    September 21, 2023    Procedure:  OnabotulinumtoxinA injections for chronic migraine  Indication:  Chronic migraine    Dinorah suffers from severe intractable headaches.  She was referred for onabotulinumtoxinA injections for headache.  Risks, benefits, and alternatives were discussed.  All questions were answered and consent given.  She decided to proceed with the injections.     Headache history, from initial consult note: \"9/14/2022 note\".    Prior to initiation of botulinum toxin injections, Ms. Mai reported 15 headache days per month, with 15 severe headache days per month. Her headaches are quite disabling and often interfere with her ability to function normally.    Date of last injections: 6/29/2023    Ms. Mai reports 5-10 headache days per month currently, with 5 severe migraines headache days per month.  She has noticed a wearing off phenomenon prior to this round of botulinum toxin injections, lasting 3 weeks.    Botulinum toxin injections have improved their functioning: lessen migraines a lot an before at least 15/month and now may be 5/month. Can work, exercise and be with her kids, more traveling    She has attempted other migraine prophylactic treatments in the past, which have included: amitriptyline, topiramate, naratriptan, rizatriptan, sumatriptan    She currently takes amitriptyline, topiramate, naratriptan for headache prevention.    Ms. Lindseys pain was assessed prior to the procedure.  She rated her pain today as 3 out of 10.    Procedural Pause: Procedural pause was conducted to verify correct patient identity, procedure to be performed, correct side and site, correct patient position, and special requirements. Appropriate hand hygiene was utilized, and each injection site was prepped with alcohol wipe or Chloraprep swab.     Procedure Details: 200 units of onabotulinumtoxinA was diluted in 4 " mL 0.9% normal saline. A total of 155 units of onabotulinumtoxinA were injected using 30 gauge 0.5 in needles into the muscles listed below. 45 units of onabotulinumtoxinA were wasted.       EQUIPMENT USED:  Needles-30 gauge, 0.5 inches for injections  Four 1-ml tuberculin syringes for injections  One sodium chloride 10 ml vial preservative free  Alcohol swabs    SKIN PREPARATION:  Skin preparation was performed using an alcohol wipe.      AREA/MUSCLE INJECTED:  155 units of Botox  Right upper Trapezius (upper cervical) - 5 units of Botox at 3 site/s.   Left upper Trapezius (upper cervical) - 5 units of Botox at 3 site/s.     Right cervical paraspinals - 5 units of Botox at 2 site/s.   Left cervical paraspinals - 5 units of Botox at 2 site/s.     Left occipitalis - 5 units of Botox at 3 site/s.  Right occipitalis -5 units of Botox at 3 site/s    Right Frontalis - 5 units of Botox at 2 site/s.  Left Frontalis - 5 units of Botox at 2 site/s.    Right Temporalis - 5 units of Botox at 4 site/s.  Left Temporalis - 5 units of Botox at 4 site/s.    Right  - 5 units of Botox at 1 site/s.              Left  - 5 units of Botox at 1 site/s.    Procerus - 5 units of Botox at 1 site/s.    RESPONSE TO PROCEDURE:  tolerated the procedure well and there were no immediate complications.  Patient was allowed to recover for an appropriate period of time and was discharged home in stable condition.    FOLLOW UP:    Repeat Botox injections in 12 weeks      This procedure was performed under a hospital privileging agreement with HUGH Carey, [facility]

## 2023-09-28 ENCOUNTER — E-VISIT (OUTPATIENT)
Dept: URGENT CARE | Facility: CLINIC | Age: 43
End: 2023-09-28
Payer: COMMERCIAL

## 2023-09-28 DIAGNOSIS — K21.00 GASTROESOPHAGEAL REFLUX DISEASE WITH ESOPHAGITIS WITHOUT HEMORRHAGE: Primary | ICD-10-CM

## 2023-09-28 PROCEDURE — 99421 OL DIG E/M SVC 5-10 MIN: CPT | Performed by: EMERGENCY MEDICINE

## 2023-09-29 ENCOUNTER — E-VISIT (OUTPATIENT)
Dept: URGENT CARE | Facility: URGENT CARE | Age: 43
End: 2023-09-29
Payer: COMMERCIAL

## 2023-09-29 DIAGNOSIS — N39.0 ACUTE UTI (URINARY TRACT INFECTION): Primary | ICD-10-CM

## 2023-09-29 PROCEDURE — 99421 OL DIG E/M SVC 5-10 MIN: CPT | Performed by: NURSE PRACTITIONER

## 2023-09-29 RX ORDER — NITROFURANTOIN 25; 75 MG/1; MG/1
100 CAPSULE ORAL 2 TIMES DAILY
Qty: 10 CAPSULE | Refills: 0 | Status: SHIPPED | OUTPATIENT
Start: 2023-09-29 | End: 2023-10-04

## 2023-09-29 NOTE — PATIENT INSTRUCTIONS
Dear Dinorah Mai    After reviewing your responses, I've been able to diagnose you with a urinary tract infection, which is a common infection of the bladder with bacteria.  This is not a sexually transmitted infection, though urinating immediately after intercourse can help prevent infections.  Drinking lots of fluids is also helpful to clear your current infection and prevent the next one.      I have sent a prescription for antibiotics to your pharmacy to treat this infection.    It is important that you take all of your prescribed medication even if your symptoms are improving after a few doses.  Taking all of your medicine helps prevent the symptoms from returning.     If your symptoms worsen, you develop pain in your back or stomach, develop fevers, or are not improving in 5 days, please contact your primary care provider for an appointment or visit any of our convenient Walk-in or Urgent Care Centers to be seen, which can be found on our website here.    Thanks again for choosing us as your health care partner,    HUGH Elaine CNP

## 2023-10-02 ENCOUNTER — VIRTUAL VISIT (OUTPATIENT)
Dept: INTERNAL MEDICINE | Facility: CLINIC | Age: 43
End: 2023-10-02
Payer: COMMERCIAL

## 2023-10-02 DIAGNOSIS — K21.00 GASTROESOPHAGEAL REFLUX DISEASE WITH ESOPHAGITIS WITHOUT HEMORRHAGE: Primary | ICD-10-CM

## 2023-10-02 DIAGNOSIS — Z00.00 PREVENTATIVE HEALTH CARE: ICD-10-CM

## 2023-10-02 DIAGNOSIS — G43.709 CHRONIC MIGRAINE WITHOUT AURA WITHOUT STATUS MIGRAINOSUS, NOT INTRACTABLE: ICD-10-CM

## 2023-10-02 PROCEDURE — 99214 OFFICE O/P EST MOD 30 MIN: CPT | Mod: 95 | Performed by: INTERNAL MEDICINE

## 2023-10-02 RX ORDER — TOPIRAMATE 25 MG/1
25 TABLET, FILM COATED ORAL 2 TIMES DAILY
Qty: 180 TABLET | Refills: 3 | Status: SHIPPED | OUTPATIENT
Start: 2023-10-02 | End: 2023-12-21

## 2023-10-02 NOTE — PATIENT INSTRUCTIONS
Continue famotidine up to 40 mg twice daily    Referral for upper GI endoscopy    Consider stool for Helicobacter and empiric treatment with omeprazole    Resume topiramate 25 mg twice daily with permission to increase to 50 mg twice daily    Prescription for omeprazole sent September 28    Avoid aspirin, naproxen, and ibuprofen until endoscopy    Okay to hold omeprazole until work-up complete    Let me know when endoscopy done to review

## 2023-10-02 NOTE — PROGRESS NOTES
Dinorah is a 43 year old female contacting the clinic today via video, who will use the platform: Jocoos for the visit.  Phone # for Doximity, or if Amwell drops:   Telephone Information:   Mobile [phone]          ASSESSMENT and PLAN:  1. Gastroesophageal reflux disease with esophagitis without hemorrhage  Okay to refer for endoscopy and biopsy for definitive evaluation given age and severity of symptoms and family history  - UPPER GI ENDOSCOPY; Future    2. Preventative health care  - REVIEW OF HEALTH MAINTENANCE PROTOCOL ORDERS    3. Chronic migraine without aura without status migrainosus, not intractable  Helpful, then tapered off.  Does not need to taper back on but may simply resume  - topiramate (TOPAMAX) 25 MG tablet; Take 1 tablet (25 mg) by mouth 2 times daily  Dispense: 180 tablet; Refill: 3       Patient Instructions   Continue famotidine up to 40 mg twice daily    Referral for upper GI endoscopy    Consider stool for Helicobacter and empiric treatment with omeprazole    Resume topiramate 25 mg twice daily with permission to increase to 50 mg twice daily    Prescription for omeprazole sent September 28    Avoid aspirin, naproxen, and ibuprofen until endoscopy    Okay to hold omeprazole until work-up complete    Let me know when endoscopy done to review            Return in about 4 months (around 2/2/2024) for using a video visit.       CHIEF COMPLAINT:  Chief Complaint   Patient presents with    Gastrophageal Reflux     Needs referral           10/2/2023     3:37 PM   Additional Questions   Roomed by Anabella   Accompanied by N/A         10/2/2023     3:37 PM   Patient Reported Additional Medications   Patient reports taking the following new medications N/A       HISTORY OF PRESENT ILLNESS:  Dinorah is a 43 year old female contacting the clinic today via video for concerns of acid reflux.  She emailed an E-visit a few weeks ago and was prescribed omeprazole for esophageal reflux.  She was hesitant to take  "it.  Her father, a physician, has advised her to be cautious of proton pump inhibitors and to undergo more thorough evaluation.  She has a strong family history of esophageal reflux and her grandfather  of acid reflux related carcinoma    She tried stopping the topiramate with worsening headaches, and is resuming it    History of Present Illness       Reason for visit:  Acid reflux    She eats 4 or more servings of fruits and vegetables daily.She consumes 0 sweetened beverage(s) daily.She exercises with enough effort to increase her heart rate 60 or more minutes per day.  She exercises with enough effort to increase her heart rate 7 days per week.   She is taking medications regularly.      REVIEW OF SYSTEMS:   Stable migraines    PFSH:  Social History     Social History Narrative    Not on file       TOBACCO USE:  History   Smoking Status    Never   Smokeless Tobacco    Never       VITALS:  There were no vitals filed for this visit.  LMP 2023 (Approximate)   Breastfeeding No  Estimated body mass index is 23.31 kg/m  as calculated from the following:    Height as of 23: 1.6 m (5' 3\").    Weight as of 23: 59.7 kg (131 lb 9.6 oz).    PHYSICAL EXAM:  (observations via Video)  Alert and oriented    MEDICATIONS:   Current Outpatient Medications   Medication Sig Dispense Refill    amitriptyline (ELAVIL) 10 MG tablet Take 1 tablet (10 mg) by mouth At Bedtime 90 tablet 3    baclofen (LIORESAL) 10 MG tablet Take 1 tablet (10 mg) by mouth 3 times daily as needed for muscle spasms 20 tablet 1    cholecalciferol 25 MCG (1000 UT) TABS       folic acid (FOLVITE) 1 MG tablet TAKE 1 TABLET(1 MG) BY MOUTH DAILY 90 tablet 3    levothyroxine (SYNTHROID/LEVOTHROID) 75 MCG tablet Take 1 tablet (75 mcg) by mouth daily 90 tablet 3    liothyronine (CYTOMEL) 5 MCG tablet TAKE 1 TABLET(5 MCG) BY MOUTH DAILY 90 tablet 3    MAGNESIUM GLYCINATE PO Take 4 capsules by mouth At Bedtime      naratriptan (AMERGE) 2.5 MG tablet " Take 1 tablet (2.5 mg) by mouth at onset of headache for migraine May repeat in 4 hours. Max 2 tablets/24 hours. 18 tablet 9    topiramate (TOPAMAX) 25 MG tablet Take 1 tablet (25 mg) by mouth 2 times daily 180 tablet 3    tranexamic acid (LYSTEDA) 650 MG tablet Take 2 tablets three times daily for up to 5 days, starting with onset of heavy menstruation. (Patient taking differently: as needed Take 2 tablets three times daily for up to 5 days, starting with onset of heavy menstruation.) 30 tablet 11    nitroFURantoin macrocrystal-monohydrate (MACROBID) 100 MG capsule Take 1 capsule (100 mg) by mouth 2 times daily for 5 days (Patient not taking: Reported on 10/2/2023) 10 capsule 0       Outside Notes summarized: MyChart communication September 28 with prescription for omeprazole  Labs, x-rays, cardiology, GI tests reviewed:   Recent Labs   Lab Test 09/06/23  1433 02/07/23  1054   TSH 2.45 2.63     No results found for: [id number]  No results found for: CHOL  New orders:   Orders Placed This Encounter   Procedures    UPPER GI ENDOSCOPY    REVIEW OF HEALTH MAINTENANCE PROTOCOL ORDERS       Independent review of:     Lucas Kennedy MD  [facility]    Video-Visit Details  Type of service:  Video Visit  Patient has given verbal consent to a Video visit?  Yes  How would you like to obtain your AVS?  MyChart  Will anyone else be joining your video visit, giving supplemental history? No    Originating location (pt location): Home    Distant Location (provider location):  Off-site    Video Start Time: 5:18 PM  Video End time:  5:37 PM  Face to face plus orders: 19 minutes  Documentation time:  3 minutes     The visit lasted a total of 22 minutes

## 2023-10-02 NOTE — PROGRESS NOTES
Dinorah is a 43 year old who is being evaluated via a billable video visit.      How would you like to obtain your AVS? MyChart  If the video visit is dropped, the invitation should be resent by: Send to e-mail at: [email or web address]  Will anyone else be joining your video visit? No  {If patient encounters technical issues they should call [phone] :333356}        {PROVIDER CHARTING PREFERENCE:605882}    Subjective   Dinorah is a 43 year old, presenting for the following health issues:  Gastrophageal Reflux (Needs referral)      10/2/2023     3:37 PM   Additional Questions   Roomed by Anabella   Accompanied by N/A         10/2/2023     3:37 PM   Patient Reported Additional Medications   Patient reports taking the following new medications N/A       History of Present Illness       Reason for visit:  Acid reflux    She eats 4 or more servings of fruits and vegetables daily.She consumes 0 sweetened beverage(s) daily.She exercises with enough effort to increase her heart rate 60 or more minutes per day.  She exercises with enough effort to increase her heart rate 7 days per week.   She is taking medications regularly.       {SUPERLIST (Optional):530041}  {additonal problems for provider to add (Optional):191492}      Review of Systems   {ROS COMP (Optional):062183}      Objective           Vitals:  No vitals were obtained today due to virtual visit.    Physical Exam   {video visit exam brief selected:199402}    {Diagnostic Test Results (Optional):195393}    {AMBULATORY ATTESTATION (Optional):240682}        Video-Visit Details    Type of service:  Video Visit     Originating Location (pt. Location): Home  {PROVIDER LOCATION On-site should be selected for visits conducted from your clinic location or adjoining [facility] hospital, academic office, or other nearby [facility] building. Off-site should be selected for all other provider locations, including home:869528}  Distant Location (provider location):  On-site  Platform used  for Video Visit: Marcelino

## 2023-10-03 ENCOUNTER — PATIENT OUTREACH (OUTPATIENT)
Dept: CARE COORDINATION | Facility: CLINIC | Age: 43
End: 2023-10-03
Payer: COMMERCIAL

## 2023-10-11 ENCOUNTER — TELEPHONE (OUTPATIENT)
Dept: NEUROLOGY | Facility: CLINIC | Age: 43
End: 2023-10-11
Payer: COMMERCIAL

## 2023-10-11 NOTE — TELEPHONE ENCOUNTER
Rx Authorization:  Requested Medication/ Dose naratriptan (AMERGE) 2.5 MG tablet   Date last refill ordered: 9/14/22  Quantity ordered: 18  # refills: 11  Date of last clinic visit with ordering provider: 3/20/23  Date of next clinic visit with ordering provider:   All pertinent protocol data (lab date/result):   Include pertinent information from patients message:

## 2023-10-12 ENCOUNTER — TELEPHONE (OUTPATIENT)
Dept: GASTROENTEROLOGY | Facility: CLINIC | Age: 43
End: 2023-10-12
Payer: COMMERCIAL

## 2023-10-12 ENCOUNTER — MYC MEDICAL ADVICE (OUTPATIENT)
Dept: ENDOCRINOLOGY | Facility: CLINIC | Age: 43
End: 2023-10-12
Payer: COMMERCIAL

## 2023-10-12 DIAGNOSIS — E03.9 HYPOTHYROIDISM, UNSPECIFIED TYPE: Primary | ICD-10-CM

## 2023-10-12 NOTE — TELEPHONE ENCOUNTER
Pre assessment completed for upcoming procedure.   (Please see previous telephone encounter notes for complete details)    Patient  returned call.       Procedure details:    Arrival time and facility location reviewed.    Pre op exam needed? N/A    Designated  policy reviewed. Instructed to have someone stay 6 hours post procedure.     COVID policy reviewed.      Medication review:    Medications reviewed. Please see supporting documentation below. Holding recommendations discussed (if applicable).       Prep for procedure:     Procedure prep instructions reviewed.        Additional information needed?  N/A      Patient  verbalized understanding and had no questions or concerns at this time.      Samra Mcfadden RN  Endoscopy Procedure Pre Assessment RN  [phone] option 4

## 2023-10-12 NOTE — TELEPHONE ENCOUNTER
Pre visit planning completed.      Procedure details:    Patient scheduled for Upper endoscopy (EGD) on 10/18/2023.     Arrival time: 1300. Procedure time 1400    Pre op exam needed? N/A    Facility location: [facility]; [address] [place] [address], 3rd Floor, [place] [address]    Sedation type: Conscious sedation     Indication for procedure: Gastroesophageal reflux disease with esophagitis without hemorrhage [K21.00]       Chart review:     Electronic implanted devices? No    Diabetic? No    Diabetic medication HOLDING recommendations: (if applicable)  Oral diabetic medications: N/A  Diabetic injectables: N/A  Insulin: N/A      Medication review:    Anticoagulants? No    NSAIDS? No NSAID medications per patient's medication list.  RN will verify with pre-assessment call.    Other medication HOLDING recommendations:  N/A      Prep for procedure:     Bowel prep recommendation: N/A     Prep instructions sent via darlene Wynn RN  Endoscopy Procedure Pre Assessment RN  [phone] option 4

## 2023-10-12 NOTE — TELEPHONE ENCOUNTER
"Endoscopy Scheduling Screen    Have you had a positive Covid test in the last 14 days?  No    Are you active on MyChart?   Yes    What insurance is in the chart?  Other:  [facility]    Ordering/Referring Provider:     JEVON ROSAS      (If ordering provider performs procedure, schedule with ordering provider unless otherwise instructed. )    BMI: Estimated body mass index is 23.31 kg/m  as calculated from the following:    Height as of 2/27/23: 1.6 m (5' 3\").    Weight as of 9/6/23: 59.7 kg (131 lb 9.6 oz).     Sedation Ordered  moderate sedation.   If patient BMI > 50 do not schedule in [facility].    If patient BMI > 45 do not schedule at [facility].    Are you taking methadone or Suboxone?  No    Are you taking any prescription medications for pain 3 or more times per week?   No    Do you have a history of malignant hyperthermia or adverse reaction to anesthesia?  No    (Females) Are you currently pregnant?   No     Have you been diagnosed or told you have pulmonary hypertension?   No    Do you have an LVAD?  No    Have you been told you have moderate to severe sleep apnea?  No    Have you been told you have COPD, asthma, or any other lung disease?  No    Do you have any heart conditions?  No     Have you ever had an organ transplant?   No    Have you ever had or are you awaiting a heart or lung transplant?   No    Have you had a stroke or transient ischemic attack (TIA aka \"mini stroke\" in the last 6 months?   No    Have you been diagnosed with or been told you have cirrhosis of the liver?   No    Are you currently on dialysis?   No    Do you need assistance transferring?   No    BMI: Estimated body mass index is 23.31 kg/m  as calculated from the following:    Height as of 2/27/23: 1.6 m (5' 3\").    Weight as of 9/6/23: 59.7 kg (131 lb 9.6 oz).     Is patients BMI > 40 and scheduling location [facility]?  No    Do you take an injectable medication for weight loss or diabetes (excluding insulin)?  No    Do you take the medication " Naltrexone?  No    Do you take blood thinners?  No       Prep   Are you currently on dialysis or do you have chronic kidney disease?  No    Do you have a diagnosis of diabetes?  No    Do you have a diagnosis of cystic fibrosis (CF)?  No    On a regular basis do you go 3 -5 days between bowel movements?  No    BMI > 40?  No    Preferred Pharmacy:    [id number] - [place], [place] - [address] AT [facility] [address]  [address]  [place] [address]  Phone: [phone] Fax: [phone]      Final Scheduling Details   Colonoscopy prep sent?  N/A    Procedure scheduled  Upper endoscopy (EGD)    Surgeon:  Suzette     Date of procedure:  10/18     Pre-OP / PAC:   No - Not required for this site.    Location  [facility] - Patient preference. Did not want [facility] as ordered    Sedation   Moderate Sedation - Per order.      Patient Reminders:   You will receive a call from a Nurse to review instructions and health history.  This assessment must be completed prior to your procedure.  Failure to complete the Nurse assessment may result in the procedure being cancelled.      On the day of your procedure, please designate an adult(s) who can drive you home stay with you for the next 24 hours. The medicines used in the exam will make you sleepy. You will not be able to drive.      You cannot take public transportation, ride share services, or non-medical taxi service without a responsible caregiver.  Medical transport services are allowed with the requirement that a responsible caregiver will receive you at your destination.  We require that drivers and caregivers are confirmed prior to your procedure.

## 2023-10-12 NOTE — TELEPHONE ENCOUNTER
Attempted to contact patient in order to complete pre assessment questions.     No answer. Left message to return call to [phone] option 4      Jessie Wynn RN  Endoscopy Procedure Pre Assessment RN

## 2023-10-13 DIAGNOSIS — G43.709 CHRONIC MIGRAINE WITHOUT AURA WITHOUT STATUS MIGRAINOSUS, NOT INTRACTABLE: ICD-10-CM

## 2023-10-13 RX ORDER — NARATRIPTAN 2.5 MG/1
2.5 TABLET ORAL
Qty: 18 TABLET | Refills: 9 | Status: SHIPPED | OUTPATIENT
Start: 2023-10-13

## 2023-10-14 NOTE — TELEPHONE ENCOUNTER
PA Initiation    Medication: NARATRIPTAN HCL 2.5 MG PO TABS  Insurance Company: [employer] - Phone [phone] Fax [phone]  Pharmacy Filling the Rx: [id number] - [place], [place] - [address] AT [facility] [address]  Filling Pharmacy Phone: [phone]  Filling Pharmacy Fax: [phone]  Start Date: 10/14/2023

## 2023-10-16 RX ORDER — LEVOTHYROXINE SODIUM 88 UG/1
88 TABLET ORAL DAILY
Qty: 90 TABLET | Refills: 3 | Status: SHIPPED | OUTPATIENT
Start: 2023-10-16 | End: 2024-10-04

## 2023-10-16 NOTE — TELEPHONE ENCOUNTER
Prior Authorization Approval    Medication: NARATRIPTAN HCL 2.5 MG PO TABS  Authorization Effective Date: 9/14/2023  Authorization Expiration Date: 11/11/2023  Approved Dose/Quantity:   Reference #:     Insurance Company: [employer] - Phone [phone] Fax [phone]  Expected CoPay: $    CoPay Card Available:      Financial Assistance Needed:   Which Pharmacy is filling the prescription: [facility] #[id number] - [place], [place] - [address] AT [facility] [address]  Pharmacy Notified: YES  Patient Notified: **Instructed pharmacy to notify patient when script is ready to /ship.**

## 2023-10-17 ENCOUNTER — PATIENT OUTREACH (OUTPATIENT)
Dept: CARE COORDINATION | Facility: CLINIC | Age: 43
End: 2023-10-17
Payer: COMMERCIAL

## 2023-10-18 ENCOUNTER — HOSPITAL ENCOUNTER (OUTPATIENT)
Facility: CLINIC | Age: 43
Discharge: HOME | End: 2023-10-18
Attending: INTERNAL MEDICINE | Admitting: INTERNAL MEDICINE
Payer: COMMERCIAL

## 2023-10-18 VITALS
OXYGEN SATURATION: 100 % | RESPIRATION RATE: 16 BRPM | HEART RATE: 95 BPM | SYSTOLIC BLOOD PRESSURE: 104 MMHG | DIASTOLIC BLOOD PRESSURE: 70 MMHG

## 2023-10-18 LAB — UPPER GI ENDOSCOPY: NORMAL

## 2023-10-18 PROCEDURE — 250N000011 HC RX IP 250 OP 636: Performed by: INTERNAL MEDICINE

## 2023-10-18 PROCEDURE — 88305 TISSUE EXAM BY PATHOLOGIST: CPT | Mod: TC | Performed by: INTERNAL MEDICINE

## 2023-10-18 PROCEDURE — G0500 MOD SEDAT ENDO SERVICE >5YRS: HCPCS | Performed by: INTERNAL MEDICINE

## 2023-10-18 PROCEDURE — 43239 EGD BIOPSY SINGLE/MULTIPLE: CPT | Performed by: INTERNAL MEDICINE

## 2023-10-18 PROCEDURE — 88305 TISSUE EXAM BY PATHOLOGIST: CPT | Mod: 26 | Performed by: PATHOLOGY

## 2023-10-18 PROCEDURE — 250N000009 HC RX 250: Performed by: INTERNAL MEDICINE

## 2023-10-18 RX ORDER — FENTANYL CITRATE 50 UG/ML
INJECTION, SOLUTION INTRAMUSCULAR; INTRAVENOUS PRN
Status: DISCONTINUED | OUTPATIENT
Start: 2023-10-18 | End: 2023-10-18 | Stop reason: HOSPADM

## 2023-10-18 RX ORDER — ONDANSETRON 2 MG/ML
4 INJECTION INTRAMUSCULAR; INTRAVENOUS
Status: DISCONTINUED | OUTPATIENT
Start: 2023-10-18 | End: 2023-10-18 | Stop reason: HOSPADM

## 2023-10-18 RX ORDER — LIDOCAINE 40 MG/G
CREAM TOPICAL
Status: DISCONTINUED | OUTPATIENT
Start: 2023-10-18 | End: 2023-10-18 | Stop reason: HOSPADM

## 2023-10-18 ASSESSMENT — ACTIVITIES OF DAILY LIVING (ADL)
ADLS_ACUITY_SCORE: 35
ADLS_ACUITY_SCORE: 35

## 2023-10-18 NOTE — H&P
Gastroenterology Pre-op History and Physical    Dinorah Mai MRN# [id number]   Age: 43 year old YOB: 1980      Date of Surgery: 10/18/23  [facility]      Date of Exam 10/18/2023 Facility Same Day       Primary care provider: Lucas Kennedy         Chief Complaint and/or Reason for Procedure:   44 yo female with chronic heartburn. Denies dysphagia or weight loss. Partial improvement with famotidine. Did not tolerate prilosec due to multiple side-effects.           Past Medical and Surgical History:     Past Medical History:   Diagnosis Date    Hypothyroidism due to Hashimoto's thyroiditis      History reviewed. No pertinent surgical history.         Medications (include herbals and vitamins):        Facility-Administered Medications Prior to Admission   Medication Dose Route Frequency Provider Last Rate Last Admin    Botulinum Toxin Type A (BOTOX) 200 units injection 155 Units  155 Units Intramuscular See Admin Instructions Ally Dyer APRN CNP   155 Units at 09/21/23 0613     Medications Prior to Admission   Medication Sig Dispense Refill Last Dose    amitriptyline (ELAVIL) 10 MG tablet Take 1 tablet (10 mg) by mouth At Bedtime 90 tablet 3 10/17/2023    baclofen (LIORESAL) 10 MG tablet Take 1 tablet (10 mg) by mouth 3 times daily as needed for muscle spasms 20 tablet 1 Past Month    cholecalciferol 25 MCG (1000 UT) TABS    10/18/2023    folic acid (FOLVITE) 1 MG tablet TAKE 1 TABLET(1 MG) BY MOUTH DAILY 90 tablet 3 10/18/2023    levothyroxine (SYNTHROID/LEVOTHROID) 88 MCG tablet Take 1 tablet (88 mcg) by mouth daily 90 tablet 3 10/17/2023    liothyronine (CYTOMEL) 5 MCG tablet TAKE 1 TABLET(5 MCG) BY MOUTH DAILY 90 tablet 3 10/18/2023    MAGNESIUM GLYCINATE PO Take 4 capsules by mouth At Bedtime   10/17/2023    naratriptan (AMERGE) 2.5 MG tablet Take 1 tablet (2.5 mg) by mouth at onset of headache for migraine May repeat in 4 hours.  Max 2 tablets/24 hours. 18 tablet 9 Past Week    topiramate (TOPAMAX) 25 MG tablet Take 1 tablet (25 mg) by mouth 2 times daily 180 tablet 3 10/18/2023    tranexamic acid (LYSTEDA) 650 MG tablet Take 2 tablets three times daily for up to 5 days, starting with onset of heavy menstruation. (Patient taking differently: as needed Take 2 tablets three times daily for up to 5 days, starting with onset of heavy menstruation.) 30 tablet 11 Past Week             Allergies:    No Known Allergies            Physical Exam:   All vitals have been reviewed  Patient Vitals for the past 8 hrs:   BP Pulse Resp SpO2   10/18/23 1312 110/73 93 16 100 %     No intake/output data recorded.  Airway assessment:   Patient is able to open mouth wide  Patient is able to stick out tongue  Mallampatti classification: Class II (visualization of the soft palate, fauces, and uvula)}      Lungs:   No increased work of breathing, good air exchange, clear to auscultation bilaterally, no crackles or wheezing     Cardiovascular:   regular rate and rhythm and normal S1 and S2                 Anesthetic risk and/or ASA classification:     ASA 1    Kostas Bradford MD

## 2023-10-19 LAB
PATH REPORT.COMMENTS IMP SPEC: NORMAL
PATH REPORT.FINAL DX SPEC: NORMAL
PATH REPORT.GROSS SPEC: NORMAL
PATH REPORT.MICROSCOPIC SPEC OTHER STN: NORMAL
PATH REPORT.RELEVANT HX SPEC: NORMAL
PHOTO IMAGE: NORMAL

## 2023-10-27 ENCOUNTER — MYC MEDICAL ADVICE (OUTPATIENT)
Dept: ENDOCRINOLOGY | Facility: CLINIC | Age: 43
End: 2023-10-27
Payer: COMMERCIAL

## 2023-10-27 DIAGNOSIS — E03.9 HYPOTHYROIDISM, UNSPECIFIED TYPE: ICD-10-CM

## 2023-10-27 RX ORDER — LEVOTHYROXINE SODIUM 75 UG/1
TABLET ORAL
Qty: 90 TABLET | Refills: 3 | Status: SHIPPED | OUTPATIENT
Start: 2023-10-27 | End: 2023-10-30

## 2023-12-17 ENCOUNTER — HEALTH MAINTENANCE LETTER (OUTPATIENT)
Age: 43
End: 2023-12-17

## 2023-12-19 ENCOUNTER — MYC MEDICAL ADVICE (OUTPATIENT)
Dept: INTERNAL MEDICINE | Facility: CLINIC | Age: 43
End: 2023-12-19
Payer: COMMERCIAL

## 2023-12-19 DIAGNOSIS — G43.709 CHRONIC MIGRAINE WITHOUT AURA WITHOUT STATUS MIGRAINOSUS, NOT INTRACTABLE: ICD-10-CM

## 2023-12-19 RX ORDER — TOPIRAMATE 50 MG/1
50 TABLET, FILM COATED ORAL 2 TIMES DAILY
Qty: 180 TABLET | Refills: 1 | Status: SHIPPED | OUTPATIENT
Start: 2023-12-19 | End: 2024-06-10

## 2023-12-19 NOTE — TELEPHONE ENCOUNTER
MD please review message and advise on dose increase requests from 25 mg bid to 50 mg bid.    I have pended NEW Rx for Topamax 50 mg bid to sign if agreeable, thank you.

## 2023-12-21 ENCOUNTER — VIRTUAL VISIT (OUTPATIENT)
Dept: NEUROLOGY | Facility: CLINIC | Age: 43
End: 2023-12-21
Payer: COMMERCIAL

## 2023-12-21 ENCOUNTER — OFFICE VISIT (OUTPATIENT)
Dept: NEUROLOGY | Facility: CLINIC | Age: 43
End: 2023-12-21
Payer: COMMERCIAL

## 2023-12-21 VITALS — WEIGHT: 125 LBS | BODY MASS INDEX: 22.15 KG/M2 | HEIGHT: 63 IN

## 2023-12-21 DIAGNOSIS — G43.709 CHRONIC MIGRAINE WITHOUT AURA WITHOUT STATUS MIGRAINOSUS, NOT INTRACTABLE: Primary | ICD-10-CM

## 2023-12-21 PROCEDURE — 99212 OFFICE O/P EST SF 10 MIN: CPT | Mod: 25 | Performed by: NURSE PRACTITIONER

## 2023-12-21 PROCEDURE — 64615 CHEMODENERV MUSC MIGRAINE: CPT | Performed by: NURSE PRACTITIONER

## 2023-12-21 ASSESSMENT — HEADACHE IMPACT TEST (HIT 6)
WHEN YOU HAVE HEADACHES HOW OFTEN IS THE PAIN SEVERE: VERY OFTEN
HOW OFTEN HAVE YOU FELT FED UP OR IRRITATED BECAUSE OF YOUR HEADACHES: SOMETIMES
HIT6 TOTAL SCORE: 63
HOW OFTEN HAVE YOU FELT TOO TIRED TO WORK BECAUSE OF YOUR HEADACHES: SOMETIMES
HOW OFTEN DID HEADACHS LIMIT CONCENTRATION ON WORK OR DAILY ACTIVITY: SOMETIMES
WHEN YOU HAVE A HEADACHE HOW OFTEN DO YOU WISH YOU COULD LIE DOWN: VERY OFTEN
HOW OFTEN DO HEADACHES LIMIT YOUR DAILY ACTIVITIES: VERY OFTEN

## 2023-12-21 ASSESSMENT — PAIN SCALES - GENERAL: PAINLEVEL: MILD PAIN (2)

## 2023-12-21 NOTE — NURSING NOTE
Is the patient currently in the state of [place]? YES    Visit mode:VIDEO    If the visit is dropped, the patient can be reconnected by: VIDEO VISIT: Text to cell phone:   Telephone Information:   Mobile [phone]       Will anyone else be joining the visit? NO  (If patient encounters technical issues they should call [phone])    How would you like to obtain your AVS? MyChart    Are changes needed to the allergy or medication list? No    Reason for visit: RECHECK   Bobbilynn Grossaint VVF

## 2023-12-21 NOTE — LETTER
12/21/2023       RE: Dinorah Mai  [address]  [place] [address]     Dear Colleague,    Thank you for referring your patient, Dinorah Mai, to the [facility] [place] at [facility]. Please see a copy of my visit note below.    Dinorah is a 43 year old who is being evaluated via a billable video visit.        Subjective  Dinorah is a 43 year old, presenting for the following health issues:  RECHECK  Initial Headache visit 9/14/2022, see note for details  Topiramate and Botox help. Headaches 5-6/month and severe 1-2/month and naratriptan helps before they they get severe. Thinks naratriptan is the best for rescue treatment.   Topiramate 50 mg twice daily -foggy brain and vision worsen and wears glasses now for reading but does not opened to change from topiramate because of benefits -less migraines in combination with Botox. Went off topiramate over summer and migraine started to recurr. Thinks that combination works   Amitriptyline 10 mg at bedtime and thinks it helps. Tried to go off   Thinks that combination topiramate+amitriptyline+Botox is the best.   Worse migraines around menstrual cycle.   Menstrual cycle and weather are still triggers along if has caffeine or alcohol.     Non Botox at least once a year or sooner if needed   Plan-  no new medications and continue with the same treatment plan  Botox today as scheduled    Objective   Vitals - Patient Reported  Pain Score: Mild Pain (2)  Pain Loc: Neck    Physical Exam   GENERAL: Healthy, alert and no distress  EYES: Eyes grossly normal to inspection.RESP: No audible wheeze, cough, or visible cyanosis.  No visible retractions or increased work of breathing.    NEURO: Face is symmetrical and symmetrical facial expressions, no weakness.  Mentation and speech appropriate for age.  PSYCH: Mentation appears normal, affect normal/bright, judgement and insight intact, normal speech and  appearance well-groomed.    I discussed all my recommendations with Dinorah Mai who verbalizes understanding and comfortable with the plan.      12 minutes spent on the date of the encounter doing video access, chart  review, meds review, treatment plan, documentation and further activities as noted above          Again, thank you for allowing me to participate in the care of your patient.      Sincerely,    HUGH Wright CNP

## 2023-12-21 NOTE — LETTER
"12/21/2023       RE: Dinorah Mai  [address]  [place] [address]       Dear Colleague,    Thank you for referring your patient, Dinorah Mai, to the [facility]UR[facility] [place] at [facility]. Please see a copy of my visit note below.    PROCEDURE NOTE:     [facility]  Botulinum Toxin Procedure     Headache Neurology    12/21/2023     Procedure:  OnabotulinumtoxinA injections for chronic migraine  Indication:  Chronic migraine     Dinorah suffers from severe intractable headaches.  She was referred for onabotulinumtoxinA injections for headache.  Risks, benefits, and alternatives were discussed.  All questions were answered and consent given.  She decided to proceed with the injections.      Headache history, from initial consult note: \"9/14/2022 note\".     Prior to initiation of botulinum toxin injections, Ms. Mai reported 15 headache days per month, with 15 severe headache days per month. Her headaches are quite disabling and often interfere with her ability to function normally.     Date of last injections: 9/21/2023     Ms. Mai reports 5-10 headache days per month currently, with 5 severe migraines headache days per month.  She has noticed a wearing off phenomenon prior to this round of botulinum toxin injections, lasting 3 weeks.     Botulinum toxin injections have improved their functioning: lessen migraines a lot an before at least 15/month and now may be 5/month. Can work, exercise and be with her kids, more traveling     She has attempted other migraine prophylactic treatments in the past, which have included: amitriptyline, topiramate, naratriptan, rizatriptan, sumatriptan     She currently takes amitriptyline, topiramate, naratriptan for headache prevention.     Ms. Mai's pain was assessed prior to the procedure.  She rated her pain today as 3 out of 10.     Procedural Pause: Procedural pause was conducted " to verify correct patient identity, procedure to be performed, correct side and site, correct patient position, and special requirements. Appropriate hand hygiene was utilized, and each injection site was prepped with alcohol wipe or Chloraprep swab.      Procedure Details: 200 units of onabotulinumtoxinA was diluted in 4 mL 0.9% normal saline. A total of 155 units of onabotulinumtoxinA were injected using 30 gauge 0.5 in needles into the muscles listed below. 45 units of onabotulinumtoxinA were wasted.         EQUIPMENT USED:  Needles-30 gauge, 0.5 inches for injections  Four 1-ml tuberculin syringes for injections  One sodium chloride 10 ml vial preservative free  Alcohol swabs     SKIN PREPARATION:  Skin preparation was performed using an alcohol wipe.        AREA/MUSCLE INJECTED:  155 units of Botox  Right upper Trapezius (upper cervical) - 5 units of Botox at 3 site/s.   Left upper Trapezius (upper cervical) - 5 units of Botox at 3 site/s.      Right cervical paraspinals - 5 units of Botox at 2 site/s.   Left cervical paraspinals - 5 units of Botox at 2 site/s.      Left occipitalis - 5 units of Botox at 3 site/s.  Right occipitalis -5 units of Botox at 3 site/s     Right Frontalis - 5 units of Botox at 2 site/s.  Left Frontalis - 5 units of Botox at 2 site/s.     Right Temporalis - 5 units of Botox at 4 site/s.  Left Temporalis - 5 units of Botox at 4 site/s.     Right  - 5 units of Botox at 1 site/s.              Left  - 5 units of Botox at 1 site/s.     Procerus - 5 units of Botox at 1 site/s.     RESPONSE TO PROCEDURE:  tolerated the procedure well and there were no immediate complications.  Patient was allowed to recover for an appropriate period of time and was discharged home in stable condition.     FOLLOW UP:     Repeat Botox injections in 12 weeks        This procedure was performed under a hospital privileging agreement with Dr. Tapia            Again, thank you for allowing me  to participate in the care of your patient.      Sincerely,    HUGH Wright CNP

## 2023-12-21 NOTE — PROGRESS NOTES
Dinorah is a 43 year old who is being evaluated via a billable video visit.        Subjective   Dinorah is a 43 year old, presenting for the following health issues:  RECHECK  Initial Headache visit 9/14/2022, see note for details  Topiramate and Botox help. Headaches 5-6/month and severe 1-2/month and naratriptan helps before they they get severe. Thinks naratriptan is the best for rescue treatment.   Topiramate 50 mg twice daily -foggy brain and vision worsen and wears glasses now for reading but does not opened to change from topiramate because of benefits -less migraines in combination with Botox. Went off topiramate over summer and migraine started to recurr. Thinks that combination works   Amitriptyline 10 mg at bedtime and thinks it helps. Tried to go off   Thinks that combination topiramate+amitriptyline+Botox is the best.   Worse migraines around menstrual cycle.   Menstrual cycle and weather are still triggers along if has caffeine or alcohol.     Non Botox at least once a year or sooner if needed   Plan-  no new medications and continue with the same treatment plan  Botox today as scheduled    Objective    Vitals - Patient Reported  Pain Score: Mild Pain (2)  Pain Loc: Neck    Physical Exam   GENERAL: Healthy, alert and no distress  EYES: Eyes grossly normal to inspection.RESP: No audible wheeze, cough, or visible cyanosis.  No visible retractions or increased work of breathing.    NEURO: Face is symmetrical and symmetrical facial expressions, no weakness.  Mentation and speech appropriate for age.  PSYCH: Mentation appears normal, affect normal/bright, judgement and insight intact, normal speech and appearance well-groomed.    I discussed all my recommendations with Dinorah Mai who verbalizes understanding and comfortable with the plan.      12 minutes spent on the date of the encounter doing video access, chart  review, meds review, treatment plan, documentation and further activities as noted  above    HUGH Perez, CNP [place]  Headache certified  [facility]      Video-Visit Details    Type of service:  Video Visit   Originating Location (pt. Location): Home  Distant Location (provider location):  Off-site  Platform used for Video Visit: Marcelino

## 2023-12-21 NOTE — PROGRESS NOTES
"PROCEDURE NOTE:     [facility]  Botulinum Toxin Procedure     Headache Neurology    12/21/2023     Procedure:  OnabotulinumtoxinA injections for chronic migraine  Indication:  Chronic migraine     Dinorah suffers from severe intractable headaches.  She was referred for onabotulinumtoxinA injections for headache.  Risks, benefits, and alternatives were discussed.  All questions were answered and consent given.  She decided to proceed with the injections.      Headache history, from initial consult note: \"9/14/2022 note\".     Prior to initiation of botulinum toxin injections, Ms. Mai reported 15 headache days per month, with 15 severe headache days per month. Her headaches are quite disabling and often interfere with her ability to function normally.     Date of last injections: 9/21/2023     Ms. Mai reports 5-10 headache days per month currently, with 5 severe migraines headache days per month.  She has noticed a wearing off phenomenon prior to this round of botulinum toxin injections, lasting 3 weeks.     Botulinum toxin injections have improved their functioning: lessen migraines a lot an before at least 15/month and now may be 5/month. Can work, exercise and be with her kids, more traveling     She has attempted other migraine prophylactic treatments in the past, which have included: amitriptyline, topiramate, naratriptan, rizatriptan, sumatriptan     She currently takes amitriptyline, topiramate, naratriptan for headache prevention.     Ms. Lindseys pain was assessed prior to the procedure.  She rated her pain today as 3 out of 10.     Procedural Pause: Procedural pause was conducted to verify correct patient identity, procedure to be performed, correct side and site, correct patient position, and special requirements. Appropriate hand hygiene was utilized, and each injection site was prepped with alcohol wipe or Chloraprep swab.      Procedure Details: 200 units of onabotulinumtoxinA was diluted " in 4 mL 0.9% normal saline. A total of 155 units of onabotulinumtoxinA were injected using 30 gauge 0.5 in needles into the muscles listed below. 45 units of onabotulinumtoxinA were wasted.         EQUIPMENT USED:  Needles-30 gauge, 0.5 inches for injections  Four 1-ml tuberculin syringes for injections  One sodium chloride 10 ml vial preservative free  Alcohol swabs     SKIN PREPARATION:  Skin preparation was performed using an alcohol wipe.        AREA/MUSCLE INJECTED:  155 units of Botox  Right upper Trapezius (upper cervical) - 5 units of Botox at 3 site/s.   Left upper Trapezius (upper cervical) - 5 units of Botox at 3 site/s.      Right cervical paraspinals - 5 units of Botox at 2 site/s.   Left cervical paraspinals - 5 units of Botox at 2 site/s.      Left occipitalis - 5 units of Botox at 3 site/s.  Right occipitalis -5 units of Botox at 3 site/s     Right Frontalis - 5 units of Botox at 2 site/s.  Left Frontalis - 5 units of Botox at 2 site/s.     Right Temporalis - 5 units of Botox at 4 site/s.  Left Temporalis - 5 units of Botox at 4 site/s.     Right  - 5 units of Botox at 1 site/s.              Left  - 5 units of Botox at 1 site/s.     Procerus - 5 units of Botox at 1 site/s.     RESPONSE TO PROCEDURE:  tolerated the procedure well and there were no immediate complications.  Patient was allowed to recover for an appropriate period of time and was discharged home in stable condition.     FOLLOW UP:     Repeat Botox injections in 12 weeks        This procedure was performed under a hospital privileging agreement with HUGH Carey, [facility]

## 2024-01-10 NOTE — PROGRESS NOTES
DERMATOLOGIC SURGERY REPORT    NAME OF PROCEDURE:  EXCISION AND INTERMEDIATE CLOSURE    Surgeon:  Bang Puentes MD    PREOPERATIVE DIAGNOSIS: atypical nevus  POSTOPERATIVE DIAGNOSIS: Same  FINAL EXCISION SIZE: 6mm lesion with 3mm margins  Repair type: complex  FINAL REPAIR LENGTH:  37mm  Location: L thigh   Prior Biopsy Accession #: [id number]    INDICATIONS:Excision was indicated for treatment. We discussed the principles of treatment and most likely complications including bleeding, infection, wound dehiscence, pain, nerve damage, and scarring. Informed consent was obtained and the patient underwent the procedure as follows.    PROCEDURE:  The patient was taken to the operative suite. The treatment area was anesthetized with 1% lidocaine with [id number] epinephrine buffered with bicarbonate. The area was washed with Hibiclens, rinsed with saline and draped with sterile towels. The lesion was delineated and excised down to subcutaneous fat. Hemostasis was obtained by electrocoagulation. With a margin      REPAIR:  In order to repair this defect while maintaining the normal anatomic relations and function, we elected to utilize a linear closure. Closure was oriented so that the wound was in the patient's natural skin tension lines. Two redundant cones were removed by triangulation. Due to tightness of the surrounding skin deeper layers of the subcutaneous tissue were undermined extensively to a distance  greater than width of the defect on both sides by dissection in the subcutaneous plane until adequate tissue mobility was obtained. Deep dermal and subcutaneous layer closure was performed using 4-0 monocryl deep, intradermal and subcutaneous sutures.  Final cutaneous approximation was achieved with 4-0 monocryl simple running sutures.      A total of 5mL of anesthesia was administered for all surgical sites. Estimated blood loss was less than 5mL for all surgical sites. A sterile pressure dressing was applied and  Quality 47: Advance Care Plan: Advance Care Planning discussed and documented; advance care plan or surrogate decision maker documented in the medical record. Quality 110: Preventive Care And Screening: Influenza Immunization: Influenza Immunization Administered during Influenza season wound care instructions, with a written handout, were given. The [facility] was [facility] alert and ambulatory.    Bang Puentes M.D.      Department of Dermatology        Quality 111:Pneumonia Vaccination Status For Older Adults: Patient received any pneumococcal conjugate or polysaccharide vaccine on or after their 60th birthday and before the end of the measurement period Detail Level: Detailed

## 2024-01-18 DIAGNOSIS — G43.709 CHRONIC MIGRAINE WITHOUT AURA WITHOUT STATUS MIGRAINOSUS, NOT INTRACTABLE: ICD-10-CM

## 2024-01-18 NOTE — TELEPHONE ENCOUNTER
RX Authorization    Medication: Amitriptyline 10 MG tablets    Date last refill ordered: 12/29/2022    Quantity ordered: 90    # refills: 3    Date of last clinic visit with ordering provider: 12/21/2023    Date of next clinic visit with ordering provider: 3/18/2024    All pertinent protocol data (lab date/result):    Include pertinent information from patients message:

## 2024-01-19 RX ORDER — AMITRIPTYLINE HYDROCHLORIDE 10 MG/1
10 TABLET ORAL AT BEDTIME
Qty: 90 TABLET | Refills: 4 | Status: SHIPPED | OUTPATIENT
Start: 2024-01-19

## 2024-02-23 ENCOUNTER — OFFICE VISIT (OUTPATIENT)
Dept: DERMATOLOGY | Facility: CLINIC | Age: 44
End: 2024-02-23
Payer: COMMERCIAL

## 2024-02-23 DIAGNOSIS — D22.9 MULTIPLE BENIGN NEVI: ICD-10-CM

## 2024-02-23 DIAGNOSIS — Z87.898 HISTORY OF ATYPICAL NEVUS: Primary | ICD-10-CM

## 2024-02-23 DIAGNOSIS — L81.4 LENTIGINES: ICD-10-CM

## 2024-02-23 DIAGNOSIS — Z12.83 ENCOUNTER FOR SCREENING FOR MALIGNANT NEOPLASM OF SKIN: ICD-10-CM

## 2024-02-23 DIAGNOSIS — D18.01 CHERRY ANGIOMA: ICD-10-CM

## 2024-02-23 PROCEDURE — 99213 OFFICE O/P EST LOW 20 MIN: CPT | Performed by: NURSE PRACTITIONER

## 2024-02-23 NOTE — LETTER
2/23/2024         RE: Dinorah Mai  [address] [place] [address]        Dear Colleague,    Thank you for referring your patient, Dinorah Mai, to the [facility]. Please see a copy of my visit note below.    [facility] Dermatology Note  Encounter Date: Feb 23, 2024  Office Visit     Reviewed patients past medical history and pertinent chart review prior to patients visit today.     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 excision 10/17/22     History of actinic keratosis on the nasal tip treated with cryotherapy 8/19/2022     ____________________________________________    Assessment & Plan:     # history of atypical nevus, left upper thigh. Well healed scar without signs of recurrent malignancy.      # Benign skin findings including: cherry angioma, lentigines and benign nevi.   - No further intervention required. Patient to report changes.   - Patient reassured of the benign nature of these lesions.    #Signs and Symptoms of non-melanoma skin cancer and ABCDEs of melanoma reviewed with patient. Patient encouraged to perform monthly self skin exams and educated on how to perform them. UV precautions reviewed with patient. Patient was asked about new or changing moles/lesions on body.     #Reviewed Sunscreen: Apply 20 minutes prior to going outdoors and reapply every two hours, when wet or sweating. We recommend using an SPF 30 or higher, and to use one that is water resistant.       Follow-up:  1 years for follow up full body skin exam, prn for new or changing lesions or new concerns    Talita Finney CNP  Dermatology     ____________________________________________    CC: Skin Check (No new concerns /HX of border line melanoma insitiu)    HPI:  Ms. Dinorah Mai is a(n) 44 year old female who presents today as a return patient for a full body skin cancer screening.  Patient has no specific concerns today.     Patient is otherwise feeling well, without additional skin concerns.     Physical Exam:  Vitals: There were no vitals taken for this visit.  SKIN: Total skin excluding the genitalia areas was performed. The exam included the head/face, neck, both arms, chest, back, abdomen, both legs, digits, mons pubis, buttock and nails.   -left lateral thigh, Well healed scar without signs of recurrent malignancy.   -several 1-2mm red dome shaped symmetric papules scattered on the trunk  -multiple tan/brown flat round macules and raised papules scattered throughout trunk, extremities and head. No worrisome features for malignancy noted on examination.  -scattered tan, homogenous macules scattered on sun exposed areas of trunk, extremities and face.     - No other lesions of concern on areas examined.     Medications:  Current Outpatient Medications   Medication     amitriptyline (ELAVIL) 10 MG tablet     baclofen (LIORESAL) 10 MG tablet     cholecalciferol 25 MCG (1000 UT) TABS     folic acid (FOLVITE) 1 MG tablet     levothyroxine (SYNTHROID/LEVOTHROID) 88 MCG tablet     liothyronine (CYTOMEL) 5 MCG tablet     MAGNESIUM GLYCINATE PO     naratriptan (AMERGE) 2.5 MG tablet     topiramate (TOPAMAX) 50 MG tablet     tranexamic acid (LYSTEDA) 650 MG tablet     Current Facility-Administered Medications   Medication     Botulinum Toxin Type A (BOTOX) 200 units injection 155 Units      Past Medical History:   Patient Active Problem List   Diagnosis     PMDD (premenstrual dysphoric disorder)     Menorrhagia with regular cycle     Hypothyroidism due to Hashimoto's thyroiditis     Chronic migraine without aura without status migrainosus, not intractable     Past Medical History:   Diagnosis Date     Hypothyroidism due to Hashimoto's thyroiditis        CC Referred Self, MD  No address on file on close of this encounter.      Again, thank you for allowing me to participate in the care of your  patient.        Sincerely,        HUGH Franco CNP

## 2024-02-23 NOTE — PROGRESS NOTES
[facility] Dermatology Note  Encounter Date: Feb 23, 2024  Office Visit     Reviewed patients past medical history and pertinent chart review prior to patients visit today.     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 excision 10/17/22     History of actinic keratosis on the nasal tip treated with cryotherapy 8/19/2022     ____________________________________________    Assessment & Plan:     # history of atypical nevus, left upper thigh. Well healed scar without signs of recurrent malignancy.      # Benign skin findings including: cherry angioma, lentigines and benign nevi.   - No further intervention required. Patient to report changes.   - Patient reassured of the benign nature of these lesions.    #Signs and Symptoms of non-melanoma skin cancer and ABCDEs of melanoma reviewed with patient. Patient encouraged to perform monthly self skin exams and educated on how to perform them. UV precautions reviewed with patient. Patient was asked about new or changing moles/lesions on body.     #Reviewed Sunscreen: Apply 20 minutes prior to going outdoors and reapply every two hours, when wet or sweating. We recommend using an SPF 30 or higher, and to use one that is water resistant.       Follow-up:  1 years for follow up full body skin exam, prn for new or changing lesions or new concerns    Talita Finney CNP  Dermatology     ____________________________________________    CC: Skin Check (No new concerns /HX of border line melanoma insitiu)    HPI:  Ms. Dinorah Mai is a(n) 44 year old female who presents today as a return patient for a full body skin cancer screening. Patient has no specific concerns today.     Patient is otherwise feeling well, without additional skin concerns.     Physical Exam:  Vitals: There were no vitals taken for this visit.  SKIN: Total skin excluding the genitalia areas was performed. The exam  included the head/face, neck, both arms, chest, back, abdomen, both legs, digits, mons pubis, buttock and nails.   -left lateral thigh, Well healed scar without signs of recurrent malignancy.   -several 1-2mm red dome shaped symmetric papules scattered on the trunk  -multiple tan/brown flat round macules and raised papules scattered throughout trunk, extremities and head. No worrisome features for malignancy noted on examination.  -scattered tan, homogenous macules scattered on sun exposed areas of trunk, extremities and face.     - No other lesions of concern on areas examined.     Medications:  Current Outpatient Medications   Medication    amitriptyline (ELAVIL) 10 MG tablet    baclofen (LIORESAL) 10 MG tablet    cholecalciferol 25 MCG (1000 UT) TABS    folic acid (FOLVITE) 1 MG tablet    levothyroxine (SYNTHROID/LEVOTHROID) 88 MCG tablet    liothyronine (CYTOMEL) 5 MCG tablet    MAGNESIUM GLYCINATE PO    naratriptan (AMERGE) 2.5 MG tablet    topiramate (TOPAMAX) 50 MG tablet    tranexamic acid (LYSTEDA) 650 MG tablet     Current Facility-Administered Medications   Medication    Botulinum Toxin Type A (BOTOX) 200 units injection 155 Units      Past Medical History:   Patient Active Problem List   Diagnosis    PMDD (premenstrual dysphoric disorder)    Menorrhagia with regular cycle    Hypothyroidism due to Hashimoto's thyroiditis    Chronic migraine without aura without status migrainosus, not intractable     Past Medical History:   Diagnosis Date    Hypothyroidism due to Hashimoto's thyroiditis        CC Referred Self, MD  No address on file on close of this encounter.

## 2024-03-11 ASSESSMENT — HEADACHE IMPACT TEST (HIT 6)
HOW OFTEN DID HEADACHS LIMIT CONCENTRATION ON WORK OR DAILY ACTIVITY: SOMETIMES
HOW OFTEN DO HEADACHES LIMIT YOUR DAILY ACTIVITIES: SOMETIMES
WHEN YOU HAVE HEADACHES HOW OFTEN IS THE PAIN SEVERE: VERY OFTEN
WHEN YOU HAVE A HEADACHE HOW OFTEN DO YOU WISH YOU COULD LIE DOWN: ALWAYS
HOW OFTEN HAVE YOU FELT FED UP OR IRRITATED BECAUSE OF YOUR HEADACHES: SOMETIMES
HIT6 TOTAL SCORE: 64
HOW OFTEN HAVE YOU FELT TOO TIRED TO WORK BECAUSE OF YOUR HEADACHES: SOMETIMES

## 2024-03-18 ENCOUNTER — OFFICE VISIT (OUTPATIENT)
Dept: NEUROLOGY | Facility: CLINIC | Age: 44
End: 2024-03-18
Payer: COMMERCIAL

## 2024-03-18 DIAGNOSIS — G43.709 CHRONIC MIGRAINE WITHOUT AURA WITHOUT STATUS MIGRAINOSUS, NOT INTRACTABLE: Primary | ICD-10-CM

## 2024-03-18 PROCEDURE — 64615 CHEMODENERV MUSC MIGRAINE: CPT | Performed by: NURSE PRACTITIONER

## 2024-03-18 NOTE — PROGRESS NOTES
"PROCEDURE NOTE:     [facility]  Botulinum Toxin Procedure     Headache Neurology     03/18/24    Procedure:  OnabotulinumtoxinA injections for chronic migraine  Indication:  Chronic migraine     Dinorah suffers from severe intractable headaches.  She was referred for onabotulinumtoxinA injections for headache.  Risks, benefits, and alternatives were discussed.  All questions were answered and consent given.  She decided to proceed with the injections.      Headache history, from initial consult note: \"9/14/2022 note\".     Prior to initiation of botulinum toxin injections, Ms. Mai reported 15 headache days per month, with 15 severe headache days per month. Her headaches are quite disabling and often interfere with her ability to function normally.     Date of last injections:  12/21/2023     Ms. Mai reports 12 headache days in last 12 weeks currently, with 7 severe migraines headache days in 12 weeks .  She has noticed a wearing off phenomenon prior to this round of botulinum toxin injections, lasting 0-1 weeks.     Botulinum toxin injections have improved their functioning: lessen migraines a lot an before at least 15/month and now may be 5/month. Can work, exercise and be with her kids, more traveling     She has attempted other migraine prophylactic treatments in the past, which have included: amitriptyline, topiramate, naratriptan, rizatriptan, sumatriptan     She currently takes amitriptyline, topiramate, naratriptan for headache prevention.     Ms. Lindseys pain was assessed prior to the procedure.  She rated her pain today as 2 out of 10.     Procedural Pause: Procedural pause was conducted to verify correct patient identity, procedure to be performed, correct side and site, correct patient position, and special requirements. Appropriate hand hygiene was utilized, and each injection site was prepped with alcohol wipe or Chloraprep swab.      Procedure Details: 200 units of onabotulinumtoxinA " was diluted in 4 mL 0.9% normal saline. A total of 155 units of onabotulinumtoxinA were injected using 30 gauge 0.5 in needles into the muscles listed below. 45 units of onabotulinumtoxinA were wasted.         EQUIPMENT USED:  Needles-30 gauge, 0.5 inches for injections  Four 1-ml tuberculin syringes for injections  One sodium chloride 10 ml vial preservative free  Alcohol swabs     SKIN PREPARATION:  Skin preparation was performed using an alcohol wipe.        AREA/MUSCLE INJECTED:  155 units of Botox  Right upper Trapezius (upper cervical) - 5 units of Botox at 3 site/s.   Left upper Trapezius (upper cervical) - 5 units of Botox at 3 site/s.      Right cervical paraspinals - 5 units of Botox at 2 site/s.   Left cervical paraspinals - 5 units of Botox at 2 site/s.      Left occipitalis - 5 units of Botox at 3 site/s.  Right occipitalis -5 units of Botox at 3 site/s     Right Frontalis - 5 units of Botox at 2 site/s.  Left Frontalis - 5 units of Botox at 2 site/s.     Right Temporalis - 5 units of Botox at 4 site/s.  Left Temporalis - 5 units of Botox at 4 site/s.     Right  - 5 units of Botox at 1 site/s.              Left  - 5 units of Botox at 1 site/s.     Procerus - 5 units of Botox at 1 site/s.     RESPONSE TO PROCEDURE:  tolerated the procedure well and there were no immediate complications.  Patient was allowed to recover for an appropriate period of time and was discharged home in stable condition.     FOLLOW UP:     Repeat Botox injections in 12 weeks        This procedure was performed under a hospital privileging agreement with Dr. Willie Dyer, HUGH, [facility]

## 2024-03-18 NOTE — LETTER
"3/18/2024       RE: Dinorah Mai  [address]  [place] [address]     Dear Colleague,    Thank you for referring your patient, Dinorah Mai, to the [facility] [place] at [facility]. Please see a copy of my visit note below.    PROCEDURE NOTE:     [facility]  Botulinum Toxin Procedure     Headache Neurology     03/18/24    Procedure:  OnabotulinumtoxinA injections for chronic migraine  Indication:  Chronic migraine     Dinorah suffers from severe intractable headaches.  She was referred for onabotulinumtoxinA injections for headache.  Risks, benefits, and alternatives were discussed.  All questions were answered and consent given.  She decided to proceed with the injections.      Headache history, from initial consult note: \"9/14/2022 note\".     Prior to initiation of botulinum toxin injections, Ms. Mai reported 15 headache days per month, with 15 severe headache days per month. Her headaches are quite disabling and often interfere with her ability to function normally.     Date of last injections:  12/21/2023     Ms. Mai reports 12 headache days in last 12 weeks currently, with 7 severe migraines headache days in 12 weeks .  She has noticed a wearing off phenomenon prior to this round of botulinum toxin injections, lasting 0-1 weeks.     Botulinum toxin injections have improved their functioning: lessen migraines a lot an before at least 15/month and now may be 5/month. Can work, exercise and be with her kids, more traveling     She has attempted other migraine prophylactic treatments in the past, which have included: amitriptyline, topiramate, naratriptan, rizatriptan, sumatriptan     She currently takes amitriptyline, topiramate, naratriptan for headache prevention.     Ms. Lindseys pain was assessed prior to the procedure.  She rated her pain today as 2 out of 10.     Procedural Pause: Procedural pause was " conducted to verify correct patient identity, procedure to be performed, correct side and site, correct patient position, and special requirements. Appropriate hand hygiene was utilized, and each injection site was prepped with alcohol wipe or Chloraprep swab.      Procedure Details: 200 units of onabotulinumtoxinA was diluted in 4 mL 0.9% normal saline. A total of 155 units of onabotulinumtoxinA were injected using 30 gauge 0.5 in needles into the muscles listed below. 45 units of onabotulinumtoxinA were wasted.         EQUIPMENT USED:  Needles-30 gauge, 0.5 inches for injections  Four 1-ml tuberculin syringes for injections  One sodium chloride 10 ml vial preservative free  Alcohol swabs     SKIN PREPARATION:  Skin preparation was performed using an alcohol wipe.        AREA/MUSCLE INJECTED:  155 units of Botox  Right upper Trapezius (upper cervical) - 5 units of Botox at 3 site/s.   Left upper Trapezius (upper cervical) - 5 units of Botox at 3 site/s.      Right cervical paraspinals - 5 units of Botox at 2 site/s.   Left cervical paraspinals - 5 units of Botox at 2 site/s.      Left occipitalis - 5 units of Botox at 3 site/s.  Right occipitalis -5 units of Botox at 3 site/s     Right Frontalis - 5 units of Botox at 2 site/s.  Left Frontalis - 5 units of Botox at 2 site/s.     Right Temporalis - 5 units of Botox at 4 site/s.  Left Temporalis - 5 units of Botox at 4 site/s.     Right  - 5 units of Botox at 1 site/s.              Left  - 5 units of Botox at 1 site/s.     Procerus - 5 units of Botox at 1 site/s.     RESPONSE TO PROCEDURE:  tolerated the procedure well and there were no immediate complications.  Patient was allowed to recover for an appropriate period of time and was discharged home in stable condition.     FOLLOW UP:     Repeat Botox injections in 12 weeks        This procedure was performed under a hospital privileging agreement with Dr. Tapia            Again, thank you for  allowing me to participate in the care of your patient.      Sincerely,    HUGH Wright CNP

## 2024-04-03 ENCOUNTER — MYC REFILL (OUTPATIENT)
Dept: OBGYN | Facility: CLINIC | Age: 44
End: 2024-04-03
Payer: COMMERCIAL

## 2024-04-03 DIAGNOSIS — N92.0 MENORRHAGIA WITH REGULAR CYCLE: ICD-10-CM

## 2024-04-03 RX ORDER — TRANEXAMIC ACID 650 MG/1
TABLET ORAL
Qty: 30 TABLET | Refills: 11 | OUTPATIENT
Start: 2024-04-03

## 2024-04-03 RX ORDER — TRANEXAMIC ACID 650 MG/1
TABLET ORAL
Qty: 30 TABLET | Refills: 0 | Status: SHIPPED | OUTPATIENT
Start: 2024-04-03 | End: 2024-05-06

## 2024-04-15 DIAGNOSIS — G43.709 CHRONIC MIGRAINE WITHOUT AURA WITHOUT STATUS MIGRAINOSUS, NOT INTRACTABLE: ICD-10-CM

## 2024-04-15 RX ORDER — FOLIC ACID 1 MG/1
TABLET ORAL
Qty: 90 TABLET | Refills: 3 | Status: SHIPPED | OUTPATIENT
Start: 2024-04-15

## 2024-04-25 DIAGNOSIS — G43.709 CHRONIC MIGRAINE WITHOUT AURA WITHOUT STATUS MIGRAINOSUS, NOT INTRACTABLE: Primary | ICD-10-CM

## 2024-04-26 DIAGNOSIS — G43.709 CHRONIC MIGRAINE WITHOUT AURA WITHOUT STATUS MIGRAINOSUS, NOT INTRACTABLE: ICD-10-CM

## 2024-05-06 ENCOUNTER — OFFICE VISIT (OUTPATIENT)
Dept: OBGYN | Facility: CLINIC | Age: 44
End: 2024-05-06
Payer: COMMERCIAL

## 2024-05-06 VITALS
DIASTOLIC BLOOD PRESSURE: 71 MMHG | SYSTOLIC BLOOD PRESSURE: 101 MMHG | HEIGHT: 63 IN | OXYGEN SATURATION: 95 % | BODY MASS INDEX: 21.62 KG/M2 | WEIGHT: 122 LBS | HEART RATE: 88 BPM

## 2024-05-06 DIAGNOSIS — F41.8 DEPRESSION WITH ANXIETY: ICD-10-CM

## 2024-05-06 DIAGNOSIS — N92.0 MENORRHAGIA WITH REGULAR CYCLE: ICD-10-CM

## 2024-05-06 DIAGNOSIS — Z00.00 ANNUAL PHYSICAL EXAM: Primary | ICD-10-CM

## 2024-05-06 PROCEDURE — 99396 PREV VISIT EST AGE 40-64: CPT | Performed by: OBSTETRICS & GYNECOLOGY

## 2024-05-06 PROCEDURE — 99214 OFFICE O/P EST MOD 30 MIN: CPT | Mod: 25 | Performed by: OBSTETRICS & GYNECOLOGY

## 2024-05-06 PROCEDURE — G0145 SCR C/V CYTO,THINLAYER,RESCR: HCPCS | Performed by: OBSTETRICS & GYNECOLOGY

## 2024-05-06 PROCEDURE — 99459 PELVIC EXAMINATION: CPT | Performed by: OBSTETRICS & GYNECOLOGY

## 2024-05-06 PROCEDURE — 87624 HPV HI-RISK TYP POOLED RSLT: CPT | Performed by: OBSTETRICS & GYNECOLOGY

## 2024-05-06 RX ORDER — ESCITALOPRAM OXALATE 10 MG/1
TABLET ORAL
Qty: 90 TABLET | Refills: 5 | Status: SHIPPED | OUTPATIENT
Start: 2024-05-06 | End: 2024-05-21

## 2024-05-06 RX ORDER — TRANEXAMIC ACID 650 MG/1
TABLET ORAL
Qty: 30 TABLET | Refills: 11 | Status: SHIPPED | OUTPATIENT
Start: 2024-05-06

## 2024-05-06 ASSESSMENT — PATIENT HEALTH QUESTIONNAIRE - PHQ9: SUM OF ALL RESPONSES TO PHQ QUESTIONS 1-9: 11

## 2024-05-06 NOTE — PROGRESS NOTES
"Dinorah is a 44 year old  female who presents for annual exam.     Menses are regular q 28-30 days and normal lasting 5 days.  Menses flow: normal.  Patient's last menstrual period was 2024.. Using none for contraception.  She is not currently considering pregnancy.  Besides routine health maintenance, she has no other health concerns today .  Had been feeling like she was hormonal around her cycle, but then noticed it's more consistent.  Started 5-6 months ago.  NO significant history of depression, but was on Zoloft during the pandemic (mom was dying, kids were home).  Has always felt she was generally a happy person.  Also healthy and has natural coping mechanisms, but they're not helping now.  Has some anxiety too.  Thinking it would be helpful to start an antidepressant.   No SI/HI  Still takes TXA, but not as much as prescribed.  Takes usually 2 doses of two pills.  Otherwise, it seems to worsen her reflux.  GYNECOLOGIC HISTORY:  Menarche: 13  Age at first intercourse: 17 Number of lifetime partners: less than 6  Dinorah is sexually active with 1male partner(s) and is currently in monogamous relationship with .    History sexually transmitted infections:No STD history  STI testing offered?  Declined  CHANTELLE exposure: No  History of abnormal Pap smear: NO - age 30-65 PAP every 5 years with negative HPV co-testing recommended  Family history of breast CA: m grandmother  Family history of uterine/ovarian CA: No    Family history of colon CA: No    HEALTH MAINTENANCE:  Cholesterol: (No results found for: \"CHOL\" History of abnormal lipids: No  Mammo: 2023 . History of abnormal Mammo: Not applicable.  Regular Self Breast Exams: Yes  Calcium/Vitamin D intake: source:  dairy, dietary supplement(s) Adequate? Yes  TSH: (  TSH   Date Value Ref Range Status   2023 2.45 0.30 - 4.20 uIU/mL Final   2022 2.23 0.40 - 4.00 mU/L Final    )  Pap; (No results found for: \"PAP\" )    HISTORY:  OB " History    Para Term  AB Living   3 3 3 0 0 3   SAB IAB Ectopic Multiple Live Births   0 0 0 0 3      # Outcome Date GA Lbr Mejia/2nd Weight Sex Type Anes PTL Lv   3 Term      Vag-Spont   SUDARSHAN   2 Term      Vag-Spont   SUDARSHAN   1 Term      Vag-Spont   SUDARSHAN     Past Medical History:   Diagnosis Date    Hypothyroidism due to Hashimoto's thyroiditis      Past Surgical History:   Procedure Laterality Date    ESOPHAGOSCOPY, GASTROSCOPY, DUODENOSCOPY (EGD), COMBINED N/A 10/18/2023    Procedure: ESOPHAGOGASTRODUODENOSCOPY, WITH BIOPSY;  Surgeon: Kostas Bradford MD;  Location:  GI     Family History   Problem Relation Age of Onset    ALS Mother     Skin Cancer Father      Social History     Socioeconomic History    Marital status:      Spouse name: None    Number of children: None    Years of education: None    Highest education level: None   Tobacco Use    Smoking status: Never    Smokeless tobacco: Never   Vaping Use    Vaping status: Never Used   Substance and Sexual Activity    Alcohol use: Yes     Comment: 2 drinks a week     Drug use: Never    Sexual activity: Yes     Partners: Male     Birth control/protection: None   Other Topics Concern    Parent/sibling w/ CABG, MI or angioplasty before 65F 55M? No     Social Determinants of Health     Financial Resource Strain: Low Risk  (10/2/2023)    Financial Resource Strain     Within the past 12 months, have you or your family members you live with been unable to get utilities (heat, electricity) when it was really needed?: No   Food Insecurity: Low Risk  (10/2/2023)    Food Insecurity     Within the past 12 months, did you worry that your food would run out before you got money to buy more?: No     Within the past 12 months, did the food you bought just not last and you didn t have money to get more?: No   Transportation Needs: Low Risk  (10/2/2023)    Transportation Needs     Within the past 12 months, has lack of transportation kept you from medical  appointments, getting your medicines, non-medical meetings or appointments, work, or from getting things that you need?: No   Housing Stability: Low Risk  (10/2/2023)    Housing Stability     Do you have housing? : Yes     Are you worried about losing your housing?: No       Current Outpatient Medications:     amitriptyline (ELAVIL) 10 MG tablet, Take 1 tablet (10 mg) by mouth at bedtime, Disp: 90 tablet, Rfl: 4    baclofen (LIORESAL) 10 MG tablet, Take 1 tablet (10 mg) by mouth 3 times daily as needed for muscle spasms, Disp: 20 tablet, Rfl: 1    cholecalciferol 25 MCG (1000 UT) TABS, , Disp: , Rfl:     escitalopram (LEXAPRO) 10 MG tablet, Take 5mg (one half tablet) daily for the first 1-2 weeks.  Then increase to a full 10mg daily., Disp: 90 tablet, Rfl: 5    folic acid (FOLVITE) 1 MG tablet, TAKE 1 TABLET(1 MG) BY MOUTH DAILY, Disp: 90 tablet, Rfl: 3    levothyroxine (SYNTHROID/LEVOTHROID) 88 MCG tablet, Take 1 tablet (88 mcg) by mouth daily, Disp: 90 tablet, Rfl: 3    liothyronine (CYTOMEL) 5 MCG tablet, TAKE 1 TABLET(5 MCG) BY MOUTH DAILY, Disp: 90 tablet, Rfl: 3    MAGNESIUM GLYCINATE PO, Take 4 capsules by mouth At Bedtime, Disp: , Rfl:     naratriptan (AMERGE) 2.5 MG tablet, Take 1 tablet (2.5 mg) by mouth at onset of headache for migraine May repeat in 4 hours. Max 2 tablets/24 hours., Disp: 18 tablet, Rfl: 9    rimegepant (NURTEC) 75 MG ODT tablet, Place 1 tablet (75 mg) under the tongue daily as needed for migraine Maximum of 1 tablet every 24 hours., Disp: 8 tablet, Rfl: 11    topiramate (TOPAMAX) 50 MG tablet, Take 1 tablet (50 mg) by mouth 2 times daily, Disp: 180 tablet, Rfl: 1    tranexamic acid (LYSTEDA) 650 MG tablet, Take 2 tablets three times daily for up to 5 days, starting with onset of heavy menstruation., Disp: 30 tablet, Rfl: 11    Current Facility-Administered Medications:     Botulinum Toxin Type A (BOTOX) 200 units injection 155 Units, 155 Units, Intramuscular, See Admin Instructions,  "Ally Dyer, HUGH CNP, 155 Units at 03/18/24 1409   No Known Allergies    Past medical, surgical, social and family history were reviewed and updated in EPIC.    ROS:   C:     NEGATIVE for fever, chills, change in weight  I:       NEGATIVE for worrisome rashes, moles or lesions  E:     NEGATIVE for vision changes or irritation  E/M: NEGATIVE for ear, mouth and throat problems  R:     NEGATIVE for significant cough or SOB  CV:   NEGATIVE for chest pain, palpitations or peripheral edema  GI:     NEGATIVE for nausea, abdominal pain, heartburn, or change in bowel habits  :   NEGATIVE for frequency, dysuria, hematuria, vaginal discharge, or irregular bleeding  M:     NEGATIVE for significant arthralgias or myalgia  N:      NEGATIVE for weakness, dizziness or paresthesias  E:      NEGATIVE for temperature intolerance, skin/hair changes  P:      NEGATIVE for changes in mood or affect.    EXAM:  /71   Pulse 88   Ht 1.6 m (5' 3\")   Wt 55.3 kg (122 lb)   LMP 04/28/2024   SpO2 95%   BMI 21.61 kg/m     BMI: Body mass index is 21.61 kg/m .  Constitutional: healthy, alert and no distress  Head: Normocephalic. No masses, lesions, tenderness or abnormalities  Neck: Neck supple. Trachea midline. No adenopathy. Thyroid symmetric, normal size.   Cardiovascular: RRR.   Respiratory: Negative.   Breast: No nodularity, asymmetry or nipple discharge bilaterally.  Gastrointestinal: Abdomen soft, non-tender, non-distended. No masses, organomegaly.  :  Vulva:  No external lesions, normal female hair distribution, no inguinal adenopathy.    Urethra:  Midline, non-tender, well supported, no discharge  Vagina:  Moist, pink, no abnormal discharge, no lesions  Cervix:  visually WNL.  No lesions or CMT  Musculoskeletal: extremities normal  Skin: no suspicious lesions or rashes  Psychiatric: Affect appropriate, cooperative,mentation appears normal.     COUNSELING:   Reviewed preventive health counseling, as " reflected in patient instructions   reports that she has never smoked. She has never used smokeless tobacco.    Body mass index is 21.61 kg/m .    FRAX Risk Assessment    ASSESSMENT:  44 year old female with satisfactory annual exam  (Z00.00) Annual physical exam  (primary encounter diagnosis)  Comment: Routine pap obtained.  Will complete mammogram this year.  Plan: Pap screen with HPV - recommended age 30 - 65         years    (F41.8) Depression with anxiety  Comment: Discussed treatment options and feel she is a good candidate for selective serotonin reuptake inhibitor.  [place] some SSRIs, like Lexapro and Celexa can help with reflux, so interested in one of those.  Lexapro is a good option.  Discussed potential side effects, as well as potential interaction with her amitriptyline.  Will start at 5mg to monitor for sedation/side effects.  If doing well, will increase to 10mg after 1-2 weeks. She also stated she may stop her amitriptyline after starting Lexapro.  She'll let me know if she has any side effects, and will reach out after the first month about how things are going.  Plan: escitalopram (LEXAPRO) 10 MG tablet    (N92.0) Menorrhagia with regular cycle  Comment: Can continue TXA as she's been taking it.  Declines any hormonal contraceptive options and repeat US, as her bleeding has improved since her last US.  Plan: tranexamic acid (LYSTEDA) 650 MG tablet    RTC for annual and prn.    Ana María Alicea MD

## 2024-05-09 LAB
BKR LAB AP GYN ADEQUACY: NORMAL
BKR LAB AP GYN INTERPRETATION: NORMAL
BKR LAB AP HPV REFLEX: NORMAL
BKR LAB AP PREVIOUS ABNORMAL: NORMAL
PATH REPORT.COMMENTS IMP SPEC: NORMAL
PATH REPORT.COMMENTS IMP SPEC: NORMAL
PATH REPORT.RELEVANT HX SPEC: NORMAL

## 2024-05-13 LAB
HUMAN PAPILLOMA VIRUS 16 DNA: NEGATIVE
HUMAN PAPILLOMA VIRUS 18 DNA: NEGATIVE
HUMAN PAPILLOMA VIRUS FINAL DIAGNOSIS: NORMAL
HUMAN PAPILLOMA VIRUS OTHER HR: NEGATIVE

## 2024-05-16 SDOH — HEALTH STABILITY: PHYSICAL HEALTH: ON AVERAGE, HOW MANY DAYS PER WEEK DO YOU ENGAGE IN MODERATE TO STRENUOUS EXERCISE (LIKE A BRISK WALK)?: 6 DAYS

## 2024-05-16 SDOH — HEALTH STABILITY: PHYSICAL HEALTH: ON AVERAGE, HOW MANY MINUTES DO YOU ENGAGE IN EXERCISE AT THIS LEVEL?: 40 MIN

## 2024-05-16 ASSESSMENT — SOCIAL DETERMINANTS OF HEALTH (SDOH): HOW OFTEN DO YOU GET TOGETHER WITH FRIENDS OR RELATIVES?: MORE THAN THREE TIMES A WEEK

## 2024-05-21 ENCOUNTER — OFFICE VISIT (OUTPATIENT)
Dept: FAMILY MEDICINE | Facility: CLINIC | Age: 44
End: 2024-05-21
Payer: COMMERCIAL

## 2024-05-21 VITALS
OXYGEN SATURATION: 98 % | DIASTOLIC BLOOD PRESSURE: 65 MMHG | TEMPERATURE: 97.7 F | BODY MASS INDEX: 21.26 KG/M2 | HEART RATE: 95 BPM | HEIGHT: 63 IN | RESPIRATION RATE: 15 BRPM | WEIGHT: 120 LBS | SYSTOLIC BLOOD PRESSURE: 95 MMHG

## 2024-05-21 DIAGNOSIS — K21.00 GASTROESOPHAGEAL REFLUX DISEASE WITH ESOPHAGITIS WITHOUT HEMORRHAGE: ICD-10-CM

## 2024-05-21 DIAGNOSIS — Z00.00 ROUTINE GENERAL MEDICAL EXAMINATION AT A HEALTH CARE FACILITY: Primary | ICD-10-CM

## 2024-05-21 DIAGNOSIS — Z13.220 SCREENING FOR HYPERLIPIDEMIA: ICD-10-CM

## 2024-05-21 DIAGNOSIS — Z76.89 ENCOUNTER TO ESTABLISH CARE WITH NEW DOCTOR: ICD-10-CM

## 2024-05-21 DIAGNOSIS — F41.1 GAD (GENERALIZED ANXIETY DISORDER): ICD-10-CM

## 2024-05-21 DIAGNOSIS — G43.709 CHRONIC MIGRAINE WITHOUT AURA WITHOUT STATUS MIGRAINOSUS, NOT INTRACTABLE: ICD-10-CM

## 2024-05-21 DIAGNOSIS — M54.2 CERVICALGIA: ICD-10-CM

## 2024-05-21 PROCEDURE — 90480 ADMN SARSCOV2 VAC 1/ONLY CMP: CPT | Performed by: NURSE PRACTITIONER

## 2024-05-21 PROCEDURE — 91320 SARSCV2 VAC 30MCG TRS-SUC IM: CPT | Performed by: NURSE PRACTITIONER

## 2024-05-21 PROCEDURE — 99215 OFFICE O/P EST HI 40 MIN: CPT | Mod: 25 | Performed by: NURSE PRACTITIONER

## 2024-05-21 PROCEDURE — 99396 PREV VISIT EST AGE 40-64: CPT | Mod: 25 | Performed by: NURSE PRACTITIONER

## 2024-05-21 PROCEDURE — 90715 TDAP VACCINE 7 YRS/> IM: CPT | Performed by: NURSE PRACTITIONER

## 2024-05-21 PROCEDURE — 90471 IMMUNIZATION ADMIN: CPT | Performed by: NURSE PRACTITIONER

## 2024-05-21 RX ORDER — PROPRANOLOL HYDROCHLORIDE 20 MG/1
20 TABLET ORAL 2 TIMES DAILY
Qty: 112 TABLET | Refills: 0 | Status: SHIPPED | OUTPATIENT
Start: 2024-05-21 | End: 2024-06-10

## 2024-05-21 RX ORDER — LANSOPRAZOLE 30 MG/1
30 CAPSULE, DELAYED RELEASE ORAL DAILY
Qty: 56 CAPSULE | Refills: 0 | Status: SHIPPED | OUTPATIENT
Start: 2024-05-21 | End: 2024-06-10

## 2024-05-21 RX ORDER — TOPIRAMATE 50 MG/1
50 TABLET, FILM COATED ORAL 2 TIMES DAILY
Qty: 180 TABLET | Refills: 1 | Status: CANCELLED | OUTPATIENT
Start: 2024-05-21

## 2024-05-21 RX ORDER — PROPRANOLOL HYDROCHLORIDE 20 MG/1
TABLET ORAL
Qty: 272 TABLET | OUTPATIENT
Start: 2024-05-21

## 2024-05-21 RX ORDER — BACLOFEN 10 MG/1
10 TABLET ORAL 3 TIMES DAILY PRN
Qty: 20 TABLET | Refills: 1 | Status: CANCELLED | OUTPATIENT
Start: 2024-05-21

## 2024-05-21 RX ORDER — FOLIC ACID 1 MG/1
1 TABLET ORAL DAILY
Qty: 90 TABLET | Refills: 3 | Status: CANCELLED | OUTPATIENT
Start: 2024-05-21

## 2024-05-21 RX ORDER — LANSOPRAZOLE 30 MG/1
CAPSULE, DELAYED RELEASE ORAL
Qty: 90 CAPSULE | OUTPATIENT
Start: 2024-05-21

## 2024-05-21 ASSESSMENT — PAIN SCALES - GENERAL: PAINLEVEL: NO PAIN (0)

## 2024-05-21 NOTE — PROGRESS NOTES
Preventive Care Visit  [facility]  HUGH Barajas CNP, Family Medicine  May 21, 2024      Assessment & Plan     Routine general medical examination at a health care facility  Preventative exam w/no abnormalities and/or concerns listed in diagnoses; discussed health maintenance screenings including prostate, breast, cervical and colorectal ca screenings related to gender;  reviewed and reconciled medication, medical history and patient related health concerns  Plan: reviewed metabolic labs; recent Gyn annual exam completed 5/6/2024    Cervicalgia  Pap completed 5/6/2024 with GYN  Results pending next screening per history and ACCSP guidelines     Encounter to establish care with new doctor   Reviewed chronic health conditions; medications, labs and pertinent health concerns today    Chronic migraine without aura without status migrainosus, not intractable  Stable; continue current regimen;     Gastroesophageal reflux disease with esophagitis without hemorrhage  Chronic; PPI for 6-8 weeks; follow up effectiveness; H plyori testing; ? EGD; ? R/t anxiety IBS  - Helicobacter pylori Antigen Stool  - LANsoprazole (PREVACID) 30 MG DR capsule  Dispense: 56 capsule; Refill: 0  - Helicobacter pylori Antigen Stool    Screening for hyperlipidemia  Assess ASCVD risk for statin therapy to reduce CAD/CVA ristk  Plan: fasting/non-fasting test  -     Lipid panel reflex to direct LDL Fasting; Future  - Lipid Profile  - Lipid Profile    JOHN (generalized anxiety disorder)      5/12/2021    12:00 PM 5/5/2024     9:43 PM   JOHN-7 SCORE   Total Score  6 (mild anxiety)   Total Score 3 6         5/12/2021    12:00 PM 5/6/2024     1:33 PM   PHQ   PHQ-9 Total Score 3 11   Q9: Thoughts of better off dead/self-harm past 2 weeks Not at all Not at all   Stable; no SI, intent or plan; continue current mood stabilizing medication without changes; plan follow up in 6-8 weeks as needed with changes/concerns  -  propranolol (INDERAL) 20 MG tablet  Dispense: 112 tablet; Refill: 0              Counseling  Appropriate preventive services were discussed with this patient, including applicable screening as appropriate for fall prevention, nutrition, physical activity, Tobacco-use cessation, weight loss and cognition.  Checklist reviewing preventive services available has been given to the patient.          Tani Copeland is a 44 year old, presenting for the following:  Physical (Physical )        5/21/2024    11:26 AM   Additional Questions   Roomed by Laura Be        Health Care Directive  Patient does not have a Health Care Directive or Living Will: Discussed advance care planning with patient; however, patient declined at this time.    HPI  44 year old year old female  with PMH   Patient Active Problem List   Diagnosis    PMDD (premenstrual dysphoric disorder)    Menorrhagia with regular cycle    Hypothyroidism due to Hashimoto's thyroiditis    Chronic migraine without aura without status migrainosus, not intractable    in clinic for preventive health care exam.     - Depression:  stopped lexapro due to side effects; fatigue; zoloft for 3 weeks fatigue;       5/12/2021    12:00 PM 5/6/2024     1:33 PM   PHQ   PHQ-9 Total Score 3 11   Q9: Thoughts of better off dead/self-harm past 2 weeks Not at all Not at all         5/12/2021    12:00 PM 5/5/2024     9:43 PM   JOHN-7 SCORE   Total Score  6 (mild anxiety)   Total Score 3 6       - GERD: recent scope 10/2023; more symptoms at night sore throat, ear ache;    Failed omeprazole side effects anxiety, bloating, constipation; increase urination;   Was taking famotidine 20 mg not sure how often; not ppi right now    In addition to the preventive visit, 40  minutes of the appointment were spent evaluating and developing a treatment plan for her additional concern(s).                  5/16/2024   General Health   How would you rate your overall physical health? Good   Feel stress  (tense, anxious, or unable to sleep) Only a little   (!) STRESS CONCERN      5/16/2024   Nutrition   Three or more servings of calcium each day? Yes   Diet: Regular (no restrictions)   How many servings of fruit and vegetables per day? 4 or more   How many sweetened beverages each day? 0-1         5/16/2024   Exercise   Days per week of moderate/strenous exercise 6 days   Average minutes spent exercising at this level 40 min         5/16/2024   Social Factors   Frequency of gathering with friends or relatives More than three times a week   Worry food won't last until get money to buy more No   Food not last or not have enough money for food? No   Do you have housing?  Yes   Are you worried about losing your housing? No   Lack of transportation? No   Unable to get utilities (heat,electricity)? No         5/16/2024   Dental   Dentist two times every year? Yes         5/16/2024   TB Screening   Were you born outside of the [place]? No           Today's PHQ-2 Score:       5/6/2024     1:33 PM   PHQ-2 ( 1999 [employer])   Q1: Little interest or pleasure in doing things 1   Q2: Feeling down, depressed or hopeless 2   PHQ-2 Score 3         5/16/2024   Substance Use   Alcohol more than 3/day or more than 7/wk No   Do you use any other substances recreationally? No     Social History     Tobacco Use    Smoking status: Never    Smokeless tobacco: Never   Vaping Use    Vaping status: Never Used   Substance Use Topics    Alcohol use: Yes     Comment: 2 drinks a week     Drug use: Never           3/2/2023   LAST FHS-7 RESULTS   1st degree relative breast or ovarian cancer No   Any relative bilateral breast cancer No   Any male have breast cancer No   Any ONE woman have BOTH breast AND ovarian cancer No   Any woman with breast cancer before 50yrs No   2 or more relatives with breast AND/OR ovarian cancer No   2 or more relatives with breast AND/OR bowel cancer No        Mammogram Screening - Annual screen due to greater than 20% lifetime  "risk as estimated by Breast Cancer Risk Calculator          5/16/2024   One time HIV Screening   Previous HIV test? Yes         5/16/2024   STI Screening   New sexual partner(s) since last STI/HIV test? No     History of abnormal Pap smear: No - age 30- 64 PAP with HPV every 5 years recommended        Latest Ref Rng & Units 5/6/2024     1:22 PM 6/10/2019     5:18 PM   PAP / HPV   PAP  Negative for Intraepithelial Lesion or Malignancy (NILM)     HPV 16 DNA Negative Negative     HPV 18 DNA Negative Negative     Other HR HPV Negative Negative     PAP-ABSTRACT   See Scanned Document           This result is from an external source.     ASCVD Risk   The ASCVD Risk score (Shyanne ROSADO, et al., 2019) failed to calculate for the following reasons:    Cannot find a previous HDL lab    Cannot find a previous total cholesterol lab        5/16/2024   Contraception/Family Planning   Questions about contraception or family planning No        Reviewed and updated as needed this visit by Provider                    Lab work is in process  Labs reviewed in EPIC  BP Readings from Last 3 Encounters:   05/21/24 95/65   05/06/24 101/71   10/18/23 104/70    Wt Readings from Last 3 Encounters:   05/21/24 54.4 kg (120 lb)   05/06/24 55.3 kg (122 lb)   12/21/23 56.7 kg (125 lb)           Review of Systems  Constitutional, HEENT, cardiovascular, pulmonary, gi and gu systems are negative, except as otherwise noted.     Objective    Exam  BP 95/65 (BP Location: Right arm, Patient Position: Sitting, Cuff Size: Adult Regular)   Pulse 95   Temp 97.7  F (36.5  C) (Temporal)   Resp 15   Ht 1.6 m (5' 3\")   Wt 54.4 kg (120 lb)   LMP 04/28/2024   SpO2 98%   BMI 21.26 kg/m     Estimated body mass index is 21.26 kg/m  as calculated from the following:    Height as of this encounter: 1.6 m (5' 3\").    Weight as of this encounter: 54.4 kg (120 lb).    Physical Exam  GENERAL: alert and no distress  EYES: Eyes grossly normal to inspection, " PERRL and conjunctivae and sclerae normal  HENT: ear canals and TM's normal, nose and mouth without ulcers or lesions  NECK: no adenopathy, no asymmetry, masses, or scars  RESP: lungs clear to auscultation - no rales, rhonchi or wheezes  BREAST: deferred  CV: regular rate and rhythm, normal S1 S2, no S3 or S4, no murmur, click or rub, no peripheral edema  ABDOMEN: soft, nontender, no hepatosplenomegaly, no masses and bowel sounds normal  MS: no gross musculoskeletal defects noted, no edema  SKIN: no suspicious lesions or rashes  NEURO: Normal strength and tone, mentation intact and speech normal  PSYCH: mentation appears normal, affect normal/bright        Signed Electronically by: HUGH Barajas CNP

## 2024-05-21 NOTE — PATIENT INSTRUCTIONS
"Hold amitriptyline  Start Propranolol 20 mg twice a day make take additional 20mg tablet for anxiety as needed  Prevacid 30 mg nightly   Preventive Care Advice   This is general advice we often give to help people stay healthy. Your care team may have specific advice just for you. Please talk to your care team about your own preventive care needs.  Lifestyle  Exercise at least 150 minutes each week (30 minutes a day, 5 days a week).  Do muscle strengthening activities 2 days a week. These help control your weight and prevent disease.  No smoking.  Wear sunscreen to prevent skin cancer.  Have your home tested for radon every 2 to 5 years. Radon is a colorless, odorless gas that can harm your lungs. To learn more, go to www.health.[facility].mn.us and search for \"Radon in Homes.\"  Keep guns unloaded and locked up in a safe place like a safe or gun vault, or, use a gun lock and hide the keys. Always lock away bullets separately. To learn more, visit [employer].gov and search for \"safe gun storage.\"  Nutrition  Eat 5 or more servings of fruits and vegetables each day.  Try wheat bread, brown rice and whole grain pasta (instead of white bread, rice, and pasta).  Get enough calcium and vitamin D. Check the label on foods and aim for 100% of the RDA (recommended daily allowance).  Regular exams  Have a dental exam and cleaning every 6 months.  See your health care team every year to talk about:  Any changes in your health.  Any medicines your care team has prescribed.  Preventive care, family planning, and ways to prevent chronic diseases.  Shots (vaccines)   HPV shots (up to age 26), if you've never had them before.  Hepatitis B shots (up to age 59), if you've never had them before.  COVID-19 shot: Get this shot when it's due.  Flu shot: Get a flu shot every year.  Tetanus shot: Get a tetanus shot every 10 years.  Pneumococcal, hepatitis A, and RSV shots: Ask your care team if you need these based on your risk.  Shingles shot (for " age 50 and up).  General health tests  Diabetes screening:  Starting at age 35, Get screened for diabetes at least every 3 years.  If you are younger than age 35, ask your care team if you should be screened for diabetes.  Cholesterol test: At age 39, start having a cholesterol test every 5 years, or more often if advised.  Bone density scan (DEXA): At age 50, ask your care team if you should have this scan for osteoporosis (brittle bones).  Hepatitis C: Get tested at least once in your life.  Abdominal aortic aneurysm screening: Talk to your doctor about having this screening if you:  Have ever smoked; and  Are biologically male; and  Are between the ages of 65 and 75.  STIs (sexually transmitted infections)  Before age 24: Ask your care team if you should be screened for STIs.  After age 24: Get screened for STIs if you're at risk. You are at risk for STIs (including HIV) if:  You are sexually active with more than one person.  You don't use condoms every time.  You or a partner was diagnosed with a sexually transmitted infection.  If you are at risk for HIV, ask about PrEP medicine to prevent HIV.  Get tested for HIV at least once in your life, whether you are at risk for HIV or not.  Cancer screening tests  Cervical cancer screening: If you have a cervix, begin getting regular cervical cancer screening tests at age 21. Most people who have regular screenings with normal results can stop after age 65. Talk about this with your provider.  Breast cancer scan (mammogram): If you've ever had breasts, begin having regular mammograms starting at age 40. This is a scan to check for breast cancer.  Colon cancer screening: It is important to start screening for colon cancer at age 45.  Have a colonoscopy test every 10 years (or more often if you're at risk) Or, ask your provider about stool tests like a FIT test every year or Cologuard test every 3 years.  To learn more about your testing options, visit:  www."[employer]"/884352.pdf.  For help making a decision, visit: rosalio.aleta/rv30661.  Prostate cancer screening test: If you have a prostate and are age 55 to 69, ask your provider if you would benefit from a yearly prostate cancer screening test.  Lung cancer screening: If you are a current or former smoker age 50 to 80, ask your care team if ongoing lung cancer screenings are right for you.  For informational purposes only. Not to replace the advice of your health care provider. Copyright   2023 [facility]. All rights reserved. Clinically reviewed by the [facility] Transitions Program. Ge.tt 282030 - REV 04/24.

## 2024-05-21 NOTE — NURSING NOTE
Prior to immunization administration, verified patients identity using patient s name and date of birth. Please see Immunization Activity for additional information.     Screening Questionnaire for Adult Immunization    Are you sick today?   No   Do you have allergies to medications, food, a vaccine component or latex?   No   Have you ever had a serious reaction after receiving a vaccination?   No   Do you have a long-term health problem with heart, lung, kidney, or metabolic disease (e.g., diabetes), asthma, a blood disorder, no spleen, complement component deficiency, a cochlear implant, or a spinal fluid leak?  Are you on long-term aspirin therapy?   No   Do you have cancer, leukemia, HIV/AIDS, or any other immune system problem?   No   Do you have a parent, brother, or sister with an immune system problem?   No   In the past 3 months, have you taken medications that affect  your immune system, such as prednisone, other steroids, or anticancer drugs; drugs for the treatment of rheumatoid arthritis, Crohn s disease, or psoriasis; or have you had radiation treatments?   No   Have you had a seizure, or a brain or other nervous system problem?   No   During the past year, have you received a transfusion of blood or blood    products, or been given immune (gamma) globulin or antiviral drug?   No   For women: Are you pregnant or is there a chance you could become       pregnant during the next month?   No   Have you received any vaccinations in the past 4 weeks?   No     Immunization questionnaire answers were all negative.      Patient instructed to remain in clinic for 15 minutes afterwards, and to report any adverse reactions.     Screening performed by Karon Shetty MA on 5/21/2024 at 12:39 PM.

## 2024-05-22 PROCEDURE — 87338 HPYLORI STOOL AG IA: CPT | Performed by: NURSE PRACTITIONER

## 2024-05-23 LAB — H PYLORI AG STL QL IA: NEGATIVE

## 2024-05-23 NOTE — RESULT ENCOUNTER NOTE
Maximilian Copeland,    Your recent results are normal. Any results slightly above or below the normal range have been evaluated as clinically stable.     Let me know if you have any questions or concerns.    Sincerely,  HUGH Barajas CNP

## 2024-06-02 DIAGNOSIS — G43.709 CHRONIC MIGRAINE WITHOUT AURA WITHOUT STATUS MIGRAINOSUS, NOT INTRACTABLE: ICD-10-CM

## 2024-06-04 RX ORDER — TOPIRAMATE 50 MG/1
50 TABLET, FILM COATED ORAL 2 TIMES DAILY
Qty: 180 TABLET | Refills: 1 | OUTPATIENT
Start: 2024-06-04

## 2024-06-05 ASSESSMENT — HEADACHE IMPACT TEST (HIT 6)
HOW OFTEN HAVE YOU FELT FED UP OR IRRITATED BECAUSE OF YOUR HEADACHES: VERY OFTEN
WHEN YOU HAVE HEADACHES HOW OFTEN IS THE PAIN SEVERE: VERY OFTEN
WHEN YOU HAVE A HEADACHE HOW OFTEN DO YOU WISH YOU COULD LIE DOWN: VERY OFTEN
HOW OFTEN DID HEADACHS LIMIT CONCENTRATION ON WORK OR DAILY ACTIVITY: VERY OFTEN
HOW OFTEN DO HEADACHES LIMIT YOUR DAILY ACTIVITIES: VERY OFTEN
HIT6 TOTAL SCORE: 66
HOW OFTEN HAVE YOU FELT TOO TIRED TO WORK BECAUSE OF YOUR HEADACHES: VERY OFTEN

## 2024-06-10 ENCOUNTER — TELEPHONE (OUTPATIENT)
Dept: NEUROLOGY | Facility: CLINIC | Age: 44
End: 2024-06-10

## 2024-06-10 ENCOUNTER — OFFICE VISIT (OUTPATIENT)
Dept: FAMILY MEDICINE | Facility: CLINIC | Age: 44
End: 2024-06-10
Payer: COMMERCIAL

## 2024-06-10 ENCOUNTER — OFFICE VISIT (OUTPATIENT)
Dept: NEUROLOGY | Facility: CLINIC | Age: 44
End: 2024-06-10
Payer: COMMERCIAL

## 2024-06-10 VITALS
HEART RATE: 74 BPM | TEMPERATURE: 98.1 F | BODY MASS INDEX: 22.01 KG/M2 | RESPIRATION RATE: 20 BRPM | SYSTOLIC BLOOD PRESSURE: 102 MMHG | WEIGHT: 124.2 LBS | OXYGEN SATURATION: 98 % | HEIGHT: 63 IN | DIASTOLIC BLOOD PRESSURE: 64 MMHG

## 2024-06-10 DIAGNOSIS — F32.81 PMDD (PREMENSTRUAL DYSPHORIC DISORDER): ICD-10-CM

## 2024-06-10 DIAGNOSIS — K59.04 CHRONIC IDIOPATHIC CONSTIPATION: ICD-10-CM

## 2024-06-10 DIAGNOSIS — K20.0 EOSINOPHILIC ESOPHAGITIS: Primary | ICD-10-CM

## 2024-06-10 DIAGNOSIS — K21.00 GASTROESOPHAGEAL REFLUX DISEASE WITH ESOPHAGITIS WITHOUT HEMORRHAGE: ICD-10-CM

## 2024-06-10 DIAGNOSIS — G43.709 CHRONIC MIGRAINE WITHOUT AURA WITHOUT STATUS MIGRAINOSUS, NOT INTRACTABLE: ICD-10-CM

## 2024-06-10 PROCEDURE — 99214 OFFICE O/P EST MOD 30 MIN: CPT | Performed by: NURSE PRACTITIONER

## 2024-06-10 PROCEDURE — 64615 CHEMODENERV MUSC MIGRAINE: CPT | Performed by: NURSE PRACTITIONER

## 2024-06-10 RX ORDER — LANSOPRAZOLE 30 MG/1
30 CAPSULE, DELAYED RELEASE ORAL 2 TIMES DAILY
Qty: 180 CAPSULE | Refills: 0 | Status: SHIPPED | OUTPATIENT
Start: 2024-06-10 | End: 2024-08-12 | Stop reason: SINTOL

## 2024-06-10 RX ORDER — TOPIRAMATE 50 MG/1
50 TABLET, FILM COATED ORAL 2 TIMES DAILY
Qty: 180 TABLET | Refills: 3 | Status: SHIPPED | OUTPATIENT
Start: 2024-06-10

## 2024-06-10 RX ORDER — LANSOPRAZOLE 30 MG/1
CAPSULE, DELAYED RELEASE ORAL
Qty: 264 CAPSULE | Refills: 0 | Status: SHIPPED | OUTPATIENT
Start: 2024-06-10 | End: 2024-06-10

## 2024-06-10 RX ORDER — FLUOXETINE 10 MG/1
CAPSULE ORAL
Qty: 30 CAPSULE | Refills: 3 | Status: SHIPPED | OUTPATIENT
Start: 2024-06-10 | End: 2024-08-23

## 2024-06-10 ASSESSMENT — PAIN SCALES - GENERAL: PAINLEVEL: NO PAIN (0)

## 2024-06-10 NOTE — TELEPHONE ENCOUNTER
Prior Authorization Retail Medication Request    Medication/Dose: Nurtec 75 mg  Diagnosis and ICD code (if different than what is on RX):    New/renewal/insurance change PA/secondary ins. PA:  Previously Tried and Failed:   amitriptyline, topiramate, naratriptan, rizatriptan, sumatriptan     She currently takes amitriptyline, topiramate, naratriptan for headache prevention.    Rationale:  Migraine    Insurance   Primary: [facility] [id number]  Insurance ID:

## 2024-06-10 NOTE — LETTER
"6/10/2024       RE: Dinorah Mai  [address]  [place] [address]       Dear Colleague,    Thank you for referring your patient, Dinorah Mai, to the [facility] [place] at [facility]. Please see a copy of my visit note below.    PROCEDURE NOTE:     [facility]  Botulinum Toxin Procedure     Headache Neurology     06/10/24     Procedure:  OnabotulinumtoxinA injections for chronic migraine  Indication:  Chronic migraine     Dinorah suffers from severe intractable headaches.  She was referred for onabotulinumtoxinA injections for headache.  Risks, benefits, and alternatives were discussed.  All questions were answered and consent given.  She decided to proceed with the injections.      Headache history, from initial consult note: \"9/14/2022 note\".     Prior to initiation of botulinum toxin injections, Ms. Mai reported 15 headache days per month, with 15 severe headache days per month. Her headaches are quite disabling and often interfere with her ability to function normally.     Date of last injections:   03/18/24    Ms. Mai moderate 8-10/month and may be one/month more severe  of severe migraines headache days in the past month and before that headaches were ok.  She has noticed a wearing off phenomenon prior to this round of botulinum toxin injections, lasting 0-1 weeks.     Botulinum toxin injections have improved their functioning: lessen migraines a lot an before at least 15/month and now may be 5/month. Can work, exercise and be with her kids, more traveling     She has attempted other migraine prophylactic treatments in the past, which have included: amitriptyline, topiramate, naratriptan, rizatriptan, sumatriptan     She currently takes amitriptyline, topiramate, naratriptan for headache prevention.     Ms. Lindseys pain was assessed prior to the procedure.  She rated her pain today as 2 out of 10.   "   Procedural Pause: Procedural pause was conducted to verify correct patient identity, procedure to be performed, correct side and site, correct patient position, and special requirements. Appropriate hand hygiene was utilized, and each injection site was prepped with alcohol wipe or Chloraprep swab.      Procedure Details: 200 units of onabotulinumtoxinA was diluted in 4 mL 0.9% normal saline. A total of 155 units of onabotulinumtoxinA were injected using 30 gauge 0.5 in needles into the muscles listed below. 45 units of onabotulinumtoxinA were wasted.         EQUIPMENT USED:  Needles-30 gauge, 0.5 inches for injections  Four 1-ml tuberculin syringes for injections  One sodium chloride 10 ml vial preservative free  Alcohol swabs     SKIN PREPARATION:  Skin preparation was performed using an alcohol wipe.        AREA/MUSCLE INJECTED:  155 units of Botox  Right upper Trapezius (upper cervical) - 5 units of Botox at 3 site/s.   Left upper Trapezius (upper cervical) - 5 units of Botox at 3 site/s.      Right cervical paraspinals - 5 units of Botox at 2 site/s.   Left cervical paraspinals - 5 units of Botox at 2 site/s.      Left occipitalis - 5 units of Botox at 3 site/s.  Right occipitalis -5 units of Botox at 3 site/s     Right Frontalis - 5 units of Botox at 2 site/s.  Left Frontalis - 5 units of Botox at 2 site/s.     Right Temporalis - 5 units of Botox at 4 site/s.  Left Temporalis - 5 units of Botox at 4 site/s.     Right  - 5 units of Botox at 1 site/s.              Left  - 5 units of Botox at 1 site/s.     Procerus - 5 units of Botox at 1 site/s.     RESPONSE TO PROCEDURE:  tolerated the procedure well and there were no immediate complications.  Patient was allowed to recover for an appropriate period of time and was discharged home in stable condition.     FOLLOW UP:     Repeat Botox injections in 12 weeks        This procedure was performed under a hospital privileging agreement with   Willie               Again, thank you for allowing me to participate in the care of your patient.      Sincerely,    HUGH Wright CNP

## 2024-06-10 NOTE — PROGRESS NOTES
"PROCEDURE NOTE:     [facility]  Botulinum Toxin Procedure     Headache Neurology     06/10/24     Procedure:  OnabotulinumtoxinA injections for chronic migraine  Indication:  Chronic migraine     Dinorah suffers from severe intractable headaches.  She was referred for onabotulinumtoxinA injections for headache.  Risks, benefits, and alternatives were discussed.  All questions were answered and consent given.  She decided to proceed with the injections.      Headache history, from initial consult note: \"9/14/2022 note\".     Prior to initiation of botulinum toxin injections, Ms. Mai reported 15 headache days per month, with 15 severe headache days per month. Her headaches are quite disabling and often interfere with her ability to function normally.     Date of last injections:   03/18/24    Ms. Mai moderate 8-10/month and may be one/month more severe  of severe migraines headache days in the past month and before that headaches were ok.  She has noticed a wearing off phenomenon prior to this round of botulinum toxin injections, lasting 0-1 weeks.     Botulinum toxin injections have improved their functioning: lessen migraines a lot an before at least 15/month and now may be 5/month. Can work, exercise and be with her kids, more traveling     She has attempted other migraine prophylactic treatments in the past, which have included: amitriptyline, topiramate, naratriptan, rizatriptan, sumatriptan     She currently takes amitriptyline, topiramate, naratriptan for headache prevention.     Ms. Lindseys pain was assessed prior to the procedure.  She rated her pain today as 2 out of 10.     Procedural Pause: Procedural pause was conducted to verify correct patient identity, procedure to be performed, correct side and site, correct patient position, and special requirements. Appropriate hand hygiene was utilized, and each injection site was prepped with alcohol wipe or Chloraprep swab.      Procedure " Details: 200 units of onabotulinumtoxinA was diluted in 4 mL 0.9% normal saline. A total of 155 units of onabotulinumtoxinA were injected using 30 gauge 0.5 in needles into the muscles listed below. 45 units of onabotulinumtoxinA were wasted.         EQUIPMENT USED:  Needles-30 gauge, 0.5 inches for injections  Four 1-ml tuberculin syringes for injections  One sodium chloride 10 ml vial preservative free  Alcohol swabs     SKIN PREPARATION:  Skin preparation was performed using an alcohol wipe.        AREA/MUSCLE INJECTED:  155 units of Botox  Right upper Trapezius (upper cervical) - 5 units of Botox at 3 site/s.   Left upper Trapezius (upper cervical) - 5 units of Botox at 3 site/s.      Right cervical paraspinals - 5 units of Botox at 2 site/s.   Left cervical paraspinals - 5 units of Botox at 2 site/s.      Left occipitalis - 5 units of Botox at 3 site/s.  Right occipitalis -5 units of Botox at 3 site/s     Right Frontalis - 5 units of Botox at 2 site/s.  Left Frontalis - 5 units of Botox at 2 site/s.     Right Temporalis - 5 units of Botox at 4 site/s.  Left Temporalis - 5 units of Botox at 4 site/s.     Right  - 5 units of Botox at 1 site/s.              Left  - 5 units of Botox at 1 site/s.     Procerus - 5 units of Botox at 1 site/s.     RESPONSE TO PROCEDURE:  tolerated the procedure well and there were no immediate complications.  Patient was allowed to recover for an appropriate period of time and was discharged home in stable condition.     FOLLOW UP:     Repeat Botox injections in 12 weeks        This procedure was performed under a hospital privileging agreement with Dr. Willie Dyer, APRN, [facility]

## 2024-06-10 NOTE — PROGRESS NOTES
Assessment & Plan     Eosinophilic esophagitis  Gastroesophageal reflux disease with esophagitis without hemorrhage  Last scope 10/2023; hiatal hernia; EOE below; slight improvement with Prevacid 30 mg nightly; will increase to BID until seen by GI; referral placed; continue dietary changes; h/o of Hashimoto ; will check autoimmune status   A.  DISTAL ESOPHAGUS, BIOPSY:  -Mildly active esophagitis with peak eosinophil count of 17/HPF (patchy distribution)  -Negative for intestinal metaplasia and dysplasia     B.  MID ESOPHAGUS, BIOPSY:  -Mildly active esophagitis with peak eosinophil count of 6/HPF (patchy distribution)  -Negative for intestinal metaplasia and dysplasia  - LANsoprazole (PREVACID) 30 MG DR capsule  Dispense: 180 capsule; Refill: 0  - Adult GI  Referral - Consult Only  - ESR  -CRP  -RAMIREZ  -DsDNA  - CELIAC     PMDD (premenstrual dysphoric disorder)  Cyclical mood changes; will trial ssri 1-2 weeks prior to menses; continue until cycle starts then may stop; may increase by 10 mg each cycle/month  if 10 mg ineffective to total of 30 mg; follow up in 8 weeks  - FLUoxetine (PROZAC) 10 MG capsule  Dispense: 30 capsule; Refill: 3    Chronic idiopathic constipation  Failed gui lax, fiber, softeners; h/o hemorrhoids 2/2 constipation; not completely emptying; failed dietary changes; check autoimmune status  - linaclotide (LINZESS) 72 MCG capsule  Dispense: 90 capsule; Refill: 3  - ESR  -CRP  -RAMIREZ  -DsDNA                Subjective   Dinorah is a 44 year old, presenting for the following health issues:  Recheck Medication        6/10/2024     8:19 AM   Additional Questions   Roomed by Charity AGUAYO     History of Present Illness       Headaches:   Since the patient's last clinic visit, headaches are: no change  The patient is getting headaches:  4 days a week  She is not able to do normal daily activities when she has a migraine.  The patient is taking the following rescue/relief medications:  Other  "  Patient states \"I get total relief\" from the rescue/relief medications.   The patient is taking the following medications to prevent migraines:  Amitriptyline and other  In the past 4 weeks, the patient has gone to an Urgent Care or Emergency Room 0 times times due to headaches.    She eats 4 or more servings of fruits and vegetables daily.She consumes 1 sweetened beverage(s) daily. She exercises with enough effort to increase her heart rate 6 days per week.   She is taking medications regularly.  Follow up last visit for depression, reflux, ear pain     Gastroesophageal reflux disease with esophagitis without hemorrhage  Chronic; PPI for 6-8 weeks; follow up effectiveness; H plyori testing; ? EGD; ? R/t anxiety IBS  - Helicobacter pylori Antigen Stool  - LANsoprazole (PREVACID) 30 MG DR capsule  Dispense: 56 capsule; Refill: 0  - Helicobacter pylori Antigen Stool    ## improved ear pain; continues to have sore throat; inflammation; reflux with coffee; family hx of esophageal cancer; last scope 10/23 bx + eoe H. Hernia     JOHN (generalized anxiety disorder)       5/12/2021    12:00 PM 5/5/2024     9:43 PM   JOHN-7 SCORE   Total Score   6 (mild anxiety)   Total Score 3 6           5/12/2021    12:00 PM 5/6/2024     1:33 PM   PHQ   PHQ-9 Total Score 3 11   Q9: Thoughts of better off dead/self-harm past 2 weeks Not at all Not at all     ## mood: no changes; propranolol caused increase sweating; headaches increased; stopped medication restarted amitriptyline; ? Cyclical mood changes with PMDD vs clinical depression; will monitor     - Constipation: chronic; longstanding history; failed miralax daily, fiber; stool stofners; h/o hemorrhoids d/t constapation denies hematochezia; melena; weight loss;                         Objective    /64 (BP Location: Right arm, Patient Position: Sitting, Cuff Size: Adult Regular)   Pulse 74   Temp 98.1  F (36.7  C) (Temporal)   Resp 20   Ht 1.6 m (5' 3\")   Wt 56.3 kg (124 lb " 3.2 oz)   LMP 05/27/2024 (Exact Date)   SpO2 98%   BMI 22.00 kg/m    Body mass index is 22 kg/m .  Physical Exam               Signed Electronically by: HUGH Barajas CNP

## 2024-06-18 NOTE — TELEPHONE ENCOUNTER
PA Initiation    Medication: NURTEC 75 MG PO TBDP  Insurance Company: [employer] - Phone [phone] Fax [phone]  Pharmacy Filling the Rx: [id number] - [place], [place] - [address] AT [facility] [address]  Filling Pharmacy Phone:    Filling Pharmacy Fax:    Start Date: 6/18/2024

## 2024-06-19 NOTE — TELEPHONE ENCOUNTER
Prior Authorization Not Needed per Insurance    Medication: NURTEC 75 MG PO TBDP  Insurance Company: [employer] - Phone [phone] Fax [phone]  Expected CoPay: $ 0  Pharmacy Filling the Rx: [facility] #[id number] - [place], [place] - [address] AT [facility] [address]  Pharmacy Notified: yes  Patient Notified: no

## 2024-06-21 ENCOUNTER — ANCILLARY PROCEDURE (OUTPATIENT)
Dept: MAMMOGRAPHY | Facility: CLINIC | Age: 44
End: 2024-06-21
Attending: NURSE PRACTITIONER
Payer: COMMERCIAL

## 2024-06-21 DIAGNOSIS — Z12.31 VISIT FOR SCREENING MAMMOGRAM: ICD-10-CM

## 2024-06-21 PROCEDURE — 77063 BREAST TOMOSYNTHESIS BI: CPT | Mod: TC | Performed by: RADIOLOGY

## 2024-06-21 PROCEDURE — 77067 SCR MAMMO BI INCL CAD: CPT | Mod: TC | Performed by: RADIOLOGY

## 2024-07-16 ENCOUNTER — OFFICE VISIT (OUTPATIENT)
Dept: URGENT CARE | Facility: URGENT CARE | Age: 44
End: 2024-07-16
Payer: COMMERCIAL

## 2024-07-16 VITALS
RESPIRATION RATE: 15 BRPM | TEMPERATURE: 98.1 F | WEIGHT: 121 LBS | DIASTOLIC BLOOD PRESSURE: 67 MMHG | HEIGHT: 63 IN | SYSTOLIC BLOOD PRESSURE: 100 MMHG | OXYGEN SATURATION: 99 % | HEART RATE: 85 BPM | BODY MASS INDEX: 21.44 KG/M2

## 2024-07-16 DIAGNOSIS — B37.2 YEAST INFECTION OF THE SKIN: Primary | ICD-10-CM

## 2024-07-16 DIAGNOSIS — R19.7 DIARRHEA, UNSPECIFIED TYPE: ICD-10-CM

## 2024-07-16 PROCEDURE — 99214 OFFICE O/P EST MOD 30 MIN: CPT | Performed by: NURSE PRACTITIONER

## 2024-07-16 RX ORDER — FLUCONAZOLE 150 MG/1
150 TABLET ORAL WEEKLY
Qty: 4 TABLET | Refills: 0 | Status: SHIPPED | OUTPATIENT
Start: 2024-07-16 | End: 2024-08-07

## 2024-07-16 NOTE — PROGRESS NOTES
Chief Complaint   Patient presents with    Diarrhea     X2 weeks of diarrhea     Rectal Problem     X5 days of Rash and itching   X1 week of possible hemorrhoids        SUBJECTIVE:   Dinorah Mai is a 44 year old female who presents today with 2 weeks of diarrhea several times a day as well as hemorrhoids and an itchy rash to her rectal area over the last 5 days.  She has a relevant past medical history of hemorrhoidectomy during pregnancy.  Symptoms started while on vacation.  She declines fever vomiting blood black severe abdominal pain tenderness.  She does endorse some GI discomfort cramping prior to the diarrhea.  Has tried a couple days of antifungal cream on the rash.  Declines seeing any white flecks in the stool or recent antibiotics overuse of alcohol Tylenol ibuprofen fatty fried foods or other relevant past medical history.  No family history of IBD.    Past Medical History:   Diagnosis Date    Hypothyroidism due to Hashimoto's thyroiditis      Current Outpatient Medications   Medication Sig Dispense Refill    amitriptyline (ELAVIL) 10 MG tablet Take 1 tablet (10 mg) by mouth at bedtime 90 tablet 4    baclofen (LIORESAL) 10 MG tablet Take 1 tablet (10 mg) by mouth 3 times daily as needed for muscle spasms 20 tablet 1    cholecalciferol 25 MCG (1000 UT) TABS       fluconazole (DIFLUCAN) 150 MG tablet Take 1 tablet (150 mg) by mouth once a week for 4 doses 4 tablet 0    FLUoxetine (PROZAC) 10 MG capsule Take 1 tablet daily with onset of menstrual mood changes 30 capsule 3    folic acid (FOLVITE) 1 MG tablet TAKE 1 TABLET(1 MG) BY MOUTH DAILY 90 tablet 3    levothyroxine (SYNTHROID/LEVOTHROID) 88 MCG tablet Take 1 tablet (88 mcg) by mouth daily 90 tablet 3    linaclotide (LINZESS) 72 MCG capsule Take 1 capsule (72 mcg) by mouth every morning (before breakfast) 90 capsule 3    liothyronine (CYTOMEL) 5 MCG tablet TAKE 1 TABLET(5 MCG) BY MOUTH DAILY 90 tablet 3    MAGNESIUM GLYCINATE PO Take 4 capsules by  "mouth At Bedtime      naratriptan (AMERGE) 2.5 MG tablet Take 1 tablet (2.5 mg) by mouth at onset of headache for migraine May repeat in 4 hours. Max 2 tablets/24 hours. 18 tablet 9    rimegepant (NURTEC) 75 MG ODT tablet Place 1 tablet (75 mg) under the tongue every 48 hours 16 tablet 11    topiramate (TOPAMAX) 50 MG tablet Take 1 tablet (50 mg) by mouth 2 times daily 180 tablet 3    tranexamic acid (LYSTEDA) 650 MG tablet Take 2 tablets three times daily for up to 5 days, starting with onset of heavy menstruation. 30 tablet 11    LANsoprazole (PREVACID) 30 MG DR capsule Take 1 capsule (30 mg) by mouth 2 times daily (Patient not taking: Reported on 7/16/2024) 180 capsule 0     Social History     Tobacco Use    Smoking status: Never    Smokeless tobacco: Never   Substance Use Topics    Alcohol use: Yes     Comment: 2 drinks a week      No Known Allergies    Review of Systems  All systems negative except for those listed above in HPI.    OBJECTIVE:  /67   Pulse 85   Temp 98.1  F (36.7  C) (Oral)   Resp 15   Ht 1.6 m (5' 3\")   Wt 54.9 kg (121 lb)   LMP  (LMP Unknown)   SpO2 99%   BMI 21.43 kg/m      Physical Exam  Vitals reviewed.   Constitutional:       Appearance: Normal appearance.   HENT:      Head: Normocephalic and atraumatic.   Cardiovascular:      Rate and Rhythm: Normal rate.   Pulmonary:      Effort: Pulmonary effort is normal.   Abdominal:      General: Abdomen is flat. Bowel sounds are normal. There is no distension.      Palpations: Abdomen is soft. There is no mass.      Tenderness: There is no abdominal tenderness. There is no right CVA tenderness, left CVA tenderness, guarding or rebound.      Hernia: No hernia is present.   Genitourinary:     Comments: Rectum with various flesh toned firm nontender hemorrhoids.  The rectum is surrounded by moderate erythema with satellite lesions as well as a white dry flaky discharge crust.  Musculoskeletal:         General: Normal range of motion. "   Skin:     General: Skin is warm and dry.      Coloration: Skin is not jaundiced or pale.      Findings: No rash.   Neurological:      General: No focal deficit present.      Mental Status: She is alert and oriented to person, place, and time.   Psychiatric:         Mood and Affect: Mood normal.         Behavior: Behavior normal.       ASSESSMENT:    ICD-10-CM    1. Yeast infection of the skin  B37.2 fluconazole (DIFLUCAN) 150 MG tablet      2. Diarrhea, unspecified type  R19.7 Enteric Bacteria and Virus Panel by JASBIR Stool     Enteric Bacteria and Virus Panel by JASBIR Stool        PLAN:     Lotrimin mixed with Desitin zinc 2-3x daily for several weeks  Diflucan for severe perirectal fungal infection not responding to topicals  Pt will hold on further testing here today such as CBC, CMP, amylase, lipase, c diff  Epsom salt warm soapy water baths  Stool kit pending, we call for abnormals of concern  Start probiotic  Avoid dairy with diarrhea  Follow-up with PCP or colorectal if lingering or concerns  ER if severe worsening pain fevers nausea vomiting pus discharge feeling faint    Follow up with primary care provider with any problems, questions or concerns or if symptoms worsen or fail to improve. Patient agreed to plan and verbalized understanding.    Breanne Aguilar, CHARLEE-BC  [facility] URG[facility]

## 2024-07-16 NOTE — PATIENT INSTRUCTIONS
Lotrimin mixed with Desitin zinc 2-3x daily for several weeks  Diflucan for severe perirectal fungal infection not responding to topicals  Epsom salt warm soapy water baths  Stool kit pending, we call for abnormals of concern  Start probiotic  Avoid dairy with diarrhea  Follow-up with PCP or colorectal if lingering or concerns  ER if severe worsening pain fevers nausea vomiting pus discharge feeling faint

## 2024-08-02 ENCOUNTER — LAB (OUTPATIENT)
Dept: LAB | Facility: CLINIC | Age: 44
End: 2024-08-02
Payer: COMMERCIAL

## 2024-08-02 DIAGNOSIS — E06.3 HYPOTHYROIDISM DUE TO HASHIMOTO'S THYROIDITIS: ICD-10-CM

## 2024-08-02 DIAGNOSIS — Z13.1 SCREENING FOR DIABETES MELLITUS: ICD-10-CM

## 2024-08-02 DIAGNOSIS — Z11.4 SCREENING FOR HIV (HUMAN IMMUNODEFICIENCY VIRUS): Primary | ICD-10-CM

## 2024-08-02 DIAGNOSIS — Z11.59 NEED FOR HEPATITIS C SCREENING TEST: ICD-10-CM

## 2024-08-02 LAB
T4 FREE SERPL-MCNC: 1.32 NG/DL (ref 0.9–1.7)
TSH SERPL DL<=0.005 MIU/L-ACNC: 1.4 UIU/ML (ref 0.3–4.2)

## 2024-08-02 PROCEDURE — 84439 ASSAY OF FREE THYROXINE: CPT

## 2024-08-02 PROCEDURE — 36415 COLL VENOUS BLD VENIPUNCTURE: CPT

## 2024-08-02 PROCEDURE — 84443 ASSAY THYROID STIM HORMONE: CPT

## 2024-08-08 SDOH — HEALTH STABILITY: PHYSICAL HEALTH: ON AVERAGE, HOW MANY DAYS PER WEEK DO YOU ENGAGE IN MODERATE TO STRENUOUS EXERCISE (LIKE A BRISK WALK)?: 6 DAYS

## 2024-08-08 ASSESSMENT — SOCIAL DETERMINANTS OF HEALTH (SDOH): HOW OFTEN DO YOU GET TOGETHER WITH FRIENDS OR RELATIVES?: MORE THAN THREE TIMES A WEEK

## 2024-08-11 PROBLEM — H92.09 EAR PAIN: Status: RESOLVED | Noted: 2019-02-25 | Resolved: 2024-08-11

## 2024-08-12 ENCOUNTER — OFFICE VISIT (OUTPATIENT)
Dept: FAMILY MEDICINE | Facility: CLINIC | Age: 44
End: 2024-08-12
Payer: COMMERCIAL

## 2024-08-12 VITALS
HEIGHT: 63 IN | BODY MASS INDEX: 21.33 KG/M2 | WEIGHT: 120.4 LBS | RESPIRATION RATE: 16 BRPM | SYSTOLIC BLOOD PRESSURE: 102 MMHG | HEART RATE: 85 BPM | TEMPERATURE: 97.4 F | OXYGEN SATURATION: 99 % | DIASTOLIC BLOOD PRESSURE: 70 MMHG

## 2024-08-12 DIAGNOSIS — N92.0 MENORRHAGIA WITH REGULAR CYCLE: ICD-10-CM

## 2024-08-12 DIAGNOSIS — Z76.89 ENCOUNTER TO ESTABLISH CARE: Primary | ICD-10-CM

## 2024-08-12 DIAGNOSIS — F32.81 PMDD (PREMENSTRUAL DYSPHORIC DISORDER): ICD-10-CM

## 2024-08-12 DIAGNOSIS — E06.3 HYPOTHYROIDISM DUE TO HASHIMOTO'S THYROIDITIS: ICD-10-CM

## 2024-08-12 DIAGNOSIS — K20.0 EOSINOPHILIC ESOPHAGITIS: ICD-10-CM

## 2024-08-12 DIAGNOSIS — G43.709 CHRONIC MIGRAINE WITHOUT AURA WITHOUT STATUS MIGRAINOSUS, NOT INTRACTABLE: ICD-10-CM

## 2024-08-12 DIAGNOSIS — K21.00 GASTROESOPHAGEAL REFLUX DISEASE WITH ESOPHAGITIS WITHOUT HEMORRHAGE: ICD-10-CM

## 2024-08-12 DIAGNOSIS — K59.04 CHRONIC IDIOPATHIC CONSTIPATION: ICD-10-CM

## 2024-08-12 PROCEDURE — 99213 OFFICE O/P EST LOW 20 MIN: CPT | Performed by: STUDENT IN AN ORGANIZED HEALTH CARE EDUCATION/TRAINING PROGRAM

## 2024-08-12 ASSESSMENT — PAIN SCALES - GENERAL: PAINLEVEL: NO PAIN (0)

## 2024-08-12 NOTE — PROGRESS NOTES
Assessment & Plan     Encounter to establish care  Reviewed medical history and medications. Physical due 5/2025.    Eosinophilic esophagitis  Gastroesophageal reflux disease with esophagitis without hemorrhage  Chronic GERD, runs in the family. Diagnosed with eosinophilic esophagitis via endoscopy 10/2023. She does many conservative things such as limiting alcohol, caffeine and triggering foods, elevating the head of the bed. Tried PPI but this caused side effects (worsening constipation/hemorrhoids, GI issues). Seeing GI in October.     Chronic idiopathic constipation  Chronic. Started on linzess recently which is helping. Discussed she can increase dose to 2 tablets daily if needed (144mcg).    PMDD (premenstrual dysphoric disorder)  Menorrhagia with regular cycle  Stable, periods have improved. Takes prozac PRN around period or when mood is worsening. Takes tranexamic acid PRN as well.    Hypothyroidism due to Hashimoto's thyroiditis  Stable, managed by endocrinologist.    Chronic migraine without aura without status migrainosus, not intractable  Stable, managed by neurology.      Counseling  Appropriate preventive services were addressed with this patient via screening, questionnaire, or discussion as appropriate for fall prevention, nutrition, physical activity, Tobacco-use cessation, weight loss and cognition.  Checklist reviewing preventive services available has been given to the patient.  Reviewed patient's diet, addressing concerns and/or questions.       Tani Copeland is a 44 year old, presenting for the following health issues:  Establish Care and Follow Up (GI)        8/12/2024     8:53 AM   Additional Questions   Roomed by Katie Hodges   Accompanied by Self     History of Present Illness       Reason for visit:  Acid reflux and constption    She eats 4 or more servings of fruits and vegetables daily.She consumes 0 sweetened beverage(s) daily.She exercises with enough effort to increase her heart  "rate 30 to 60 minutes per day.  She exercises with enough effort to increase her heart rate 6 days per week.   She is taking medications regularly.     Establish care, PCP left  Kids    GERD  Eosinophilic esophagitis  -PPI caused side effects  -h pylori neg  -was referred to GI in June (seeing GI in a few months for endoscopy follow up)  -hereditary  -limits alcohol, triggering foods, limits caffeine, head of bed is elevated  -resistant to many meds or gets side effects from them  -grandpa passed from throat cancer likely due to GERD; dad is physician who also has bad GERD (he has had surgeries in the past for this)    Chronic constipation  -hemorrhoids  -on linzess-helping, noticing less migraines too    Migraines  -managed by specialist  -amitriptyline, triptan prn, magnesium glycinate, nurtec prn, topiramate 50mg BID  -20 years  -goes to neurology clinic  -botox every few months  -some brain fog with topiramate, but helping control migraines    Hypothyroidism  -synthroid 88mcg  -liothyronine 5mcg  -sees endocrinologist  -well controlled     Periods  -prozac around periods  -tranexamic acid prn  -improved recently        Objective    /70 (BP Location: Right arm, Patient Position: Sitting, Cuff Size: Adult Regular)   Pulse 85   Temp 97.4  F (36.3  C) (Temporal)   Resp 16   Ht 1.6 m (5' 3\")   Wt 54.6 kg (120 lb 6.4 oz)   LMP  (LMP Unknown)   SpO2 99%   BMI 21.33 kg/m    Body mass index is 21.33 kg/m .    Physical Exam  Constitutional:       General: She is not in acute distress.     Appearance: She is not ill-appearing.   Pulmonary:      Effort: Pulmonary effort is normal.   Neurological:      General: No focal deficit present.      Mental Status: She is alert and oriented to person, place, and time.   Psychiatric:         Mood and Affect: Mood normal.         Behavior: Behavior normal.            Signed Electronically by: Juan Manuel Park, DO      I spent a total of 21 minutes on the day of the " visit.   Time spent by me doing chart review, history and exam, documentation and further activities per the note

## 2024-08-12 NOTE — PATIENT INSTRUCTIONS
Thank you for coming to see me today! Here are a couple of pieces of information about my schedule and communication practices.     I am not in the clinic on Tuesdays. Non-urgent calls and messages received on Tuesdays will be addressed as soon as I am able when I am back in the office on the next business day. Urgent calls will be addressed by a covering clinician.       If lab work was done today as part of your evaluation you will generally be contacted via [facility], mail, or phone with the results within 7-10 days.  If there is an alarming/concerning result we will contact you by phone. Lab results come back at varying times, I generally wait until all labs are resulted before making comments on results. Please note, labs are automatically released to [facility] once available, but it may take a couple of days for my interpretation note to appear.      I try very hard to respond to medical messages with 2 business days of receiving them. Occasionally it takes me longer if I am trying to figure out the best way to respond and need to seek guidance, do some research or dig deeper into your medical history to come up with a helpful response.      If you need refills please contact your pharmacist. They will send a refill request to me to review. Please allow 3-5 business days for us to respond to all refill requests.      Please call or send a medical message with any questions or concerns. Thank you for trusting me to be part of your healthcare team!        Dr. Juan Manuel Park

## 2024-08-13 DIAGNOSIS — G43.709 CHRONIC MIGRAINE WITHOUT AURA WITHOUT STATUS MIGRAINOSUS, NOT INTRACTABLE: Primary | ICD-10-CM

## 2024-08-22 ENCOUNTER — MYC MEDICAL ADVICE (OUTPATIENT)
Dept: FAMILY MEDICINE | Facility: CLINIC | Age: 44
End: 2024-08-22
Payer: COMMERCIAL

## 2024-08-22 DIAGNOSIS — F32.81 PMDD (PREMENSTRUAL DYSPHORIC DISORDER): ICD-10-CM

## 2024-08-23 NOTE — TELEPHONE ENCOUNTER
Dr. Park - see MyChart, pt would like to take med daily and not PRN. Pended below with updated sig for review and signing if in agreement.    SATISH CisnerosN, RN (she/her)  [facility] RN

## 2024-08-26 RX ORDER — FLUOXETINE 10 MG/1
10 CAPSULE ORAL DAILY
Qty: 90 CAPSULE | Refills: 2 | Status: SHIPPED | OUTPATIENT
Start: 2024-08-26

## 2024-09-05 ENCOUNTER — OFFICE VISIT (OUTPATIENT)
Dept: NEUROLOGY | Facility: CLINIC | Age: 44
End: 2024-09-05
Payer: COMMERCIAL

## 2024-09-05 DIAGNOSIS — G43.709 CHRONIC MIGRAINE WITHOUT AURA WITHOUT STATUS MIGRAINOSUS, NOT INTRACTABLE: Primary | ICD-10-CM

## 2024-09-05 PROCEDURE — 64615 CHEMODENERV MUSC MIGRAINE: CPT | Performed by: NURSE PRACTITIONER

## 2024-09-05 ASSESSMENT — HEADACHE IMPACT TEST (HIT 6)
HIT6 TOTAL SCORE: 63
WHEN YOU HAVE HEADACHES HOW OFTEN IS THE PAIN SEVERE: VERY OFTEN
WHEN YOU HAVE A HEADACHE HOW OFTEN DO YOU WISH YOU COULD LIE DOWN: VERY OFTEN
HOW OFTEN HAVE YOU FELT FED UP OR IRRITATED BECAUSE OF YOUR HEADACHES: SOMETIMES
HOW OFTEN HAVE YOU FELT TOO TIRED TO WORK BECAUSE OF YOUR HEADACHES: SOMETIMES
HOW OFTEN DID HEADACHS LIMIT CONCENTRATION ON WORK OR DAILY ACTIVITY: SOMETIMES
HOW OFTEN DO HEADACHES LIMIT YOUR DAILY ACTIVITIES: VERY OFTEN

## 2024-09-05 NOTE — LETTER
"9/5/2024       RE: Dinorah Mai  [address]  [place] [address]     Dear Colleague,    Thank you for referring your patient, Dinorah Mai, to the [facility] [place] at [facility]. Please see a copy of my visit note below.    PROCEDURE NOTE:     [facility]  Botulinum Toxin Procedure     Headache Neurology     09/05/24       Procedure:  OnabotulinumtoxinA injections for chronic migraine  Indication:  Chronic migraine     Dinorah suffers from severe intractable headaches.  She was referred for onabotulinumtoxinA injections for headache.  Risks, benefits, and alternatives were discussed.  All questions were answered and consent given.  She decided to proceed with the injections.      Headache history, from initial consult note: \"9/14/2022 note\".     Prior to initiation of botulinum toxin injections, Ms. Mai reported 15 headache days per month, with 15 severe headache days per month. Her headaches are quite disabling and often interfere with her ability to function normally.     Date of last injections: 6/10/2024       Ms. Mai moderate 20 days in 90 days and no severe.  She has noticed a wearing off phenomenon prior to this round of botulinum toxin injections, lasting 0-1 weeks.     Botulinum toxin injections have improved their functioning: lessen migraines a lot an before at least 15/month and now may be 5/month. Can work, exercise and be with her kids, more traveling     She has attempted other migraine prophylactic treatments in the past, which have included: amitriptyline, topiramate, naratriptan, rizatriptan, sumatriptan     She currently takes amitriptyline, topiramate, naratriptan for headache prevention.     Ms. Lindseys pain was assessed prior to the procedure.  She rated her pain today as 0 out of 10.     Procedural Pause: Procedural pause was conducted to verify correct patient identity, procedure to be " performed, correct side and site, correct patient position, and special requirements. Appropriate hand hygiene was utilized, and each injection site was prepped with alcohol wipe or Chloraprep swab.      Procedure Details: 200 units of onabotulinumtoxinA was diluted in 4 mL 0.9% normal saline. A total of 155 units of onabotulinumtoxinA were injected using 30 gauge 0.5 in needles into the muscles listed below. 45 units of onabotulinumtoxinA were wasted.         EQUIPMENT USED:  Needles-30 gauge, 0.5 inches for injections  Four 1-ml tuberculin syringes for injections  One sodium chloride 10 ml vial preservative free  Alcohol swabs     SKIN PREPARATION:  Skin preparation was performed using an alcohol wipe.        AREA/MUSCLE INJECTED:  155 units of Botox  Right upper Trapezius (upper cervical) - 5 units of Botox at 3 site/s.   Left upper Trapezius (upper cervical) - 5 units of Botox at 3 site/s.      Right cervical paraspinals - 5 units of Botox at 2 site/s.   Left cervical paraspinals - 5 units of Botox at 2 site/s.      Left occipitalis - 5 units of Botox at 3 site/s.  Right occipitalis -5 units of Botox at 3 site/s     Right Frontalis - 5 units of Botox at 2 site/s.  Left Frontalis - 5 units of Botox at 2 site/s.     Right Temporalis - 5 units of Botox at 4 site/s.  Left Temporalis - 5 units of Botox at 4 site/s.     Right  - 5 units of Botox at 1 site/s.              Left  - 5 units of Botox at 1 site/s.     Procerus - 5 units of Botox at 1 site/s.     RESPONSE TO PROCEDURE:  tolerated the procedure well and there were no immediate complications.  Patient was allowed to recover for an appropriate period of time and was discharged home in stable condition.     FOLLOW UP:     Repeat Botox injections in 12 weeks        This procedure was performed under a hospital privileging agreement with Dr. Willie Dyer, APRN, [facility]         Again, thank you for  allowing me to participate in the care of your patient.      Sincerely,    HUGH Wright CNP

## 2024-09-05 NOTE — PROGRESS NOTES
"PROCEDURE NOTE:     [facility]  Botulinum Toxin Procedure     Headache Neurology     09/05/24       Procedure:  OnabotulinumtoxinA injections for chronic migraine  Indication:  Chronic migraine     Dinorah suffers from severe intractable headaches.  She was referred for onabotulinumtoxinA injections for headache.  Risks, benefits, and alternatives were discussed.  All questions were answered and consent given.  She decided to proceed with the injections.      Headache history, from initial consult note: \"9/14/2022 note\".     Prior to initiation of botulinum toxin injections, Ms. Mai reported 15 headache days per month, with 15 severe headache days per month. Her headaches are quite disabling and often interfere with her ability to function normally.     Date of last injections: 6/10/2024       Ms. Mai moderate 20 days in 90 days and no severe.  She has noticed a wearing off phenomenon prior to this round of botulinum toxin injections, lasting 0-1 weeks.     Botulinum toxin injections have improved their functioning: lessen migraines a lot an before at least 15/month and now may be 5/month. Can work, exercise and be with her kids, more traveling     She has attempted other migraine prophylactic treatments in the past, which have included: amitriptyline, topiramate, naratriptan, rizatriptan, sumatriptan     She currently takes amitriptyline, topiramate, naratriptan for headache prevention.     Ms. Lawrence pain was assessed prior to the procedure.  She rated her pain today as 0 out of 10.     Procedural Pause: Procedural pause was conducted to verify correct patient identity, procedure to be performed, correct side and site, correct patient position, and special requirements. Appropriate hand hygiene was utilized, and each injection site was prepped with alcohol wipe or Chloraprep swab.      Procedure Details: 200 units of onabotulinumtoxinA was diluted in 4 mL 0.9% normal saline. A total of 155 units " of onabotulinumtoxinA were injected using 30 gauge 0.5 in needles into the muscles listed below. 45 units of onabotulinumtoxinA were wasted.         EQUIPMENT USED:  Needles-30 gauge, 0.5 inches for injections  Four 1-ml tuberculin syringes for injections  One sodium chloride 10 ml vial preservative free  Alcohol swabs     SKIN PREPARATION:  Skin preparation was performed using an alcohol wipe.        AREA/MUSCLE INJECTED:  155 units of Botox  Right upper Trapezius (upper cervical) - 5 units of Botox at 3 site/s.   Left upper Trapezius (upper cervical) - 5 units of Botox at 3 site/s.      Right cervical paraspinals - 5 units of Botox at 2 site/s.   Left cervical paraspinals - 5 units of Botox at 2 site/s.      Left occipitalis - 5 units of Botox at 3 site/s.  Right occipitalis -5 units of Botox at 3 site/s     Right Frontalis - 5 units of Botox at 2 site/s.  Left Frontalis - 5 units of Botox at 2 site/s.     Right Temporalis - 5 units of Botox at 4 site/s.  Left Temporalis - 5 units of Botox at 4 site/s.     Right  - 5 units of Botox at 1 site/s.              Left  - 5 units of Botox at 1 site/s.     Procerus - 5 units of Botox at 1 site/s.     RESPONSE TO PROCEDURE:  tolerated the procedure well and there were no immediate complications.  Patient was allowed to recover for an appropriate period of time and was discharged home in stable condition.     FOLLOW UP:     Repeat Botox injections in 12 weeks        This procedure was performed under a hospital privileging agreement with Dr. Willie Dyer, APRN, [facility]

## 2024-09-24 DIAGNOSIS — E06.3 HYPOTHYROIDISM DUE TO HASHIMOTO'S THYROIDITIS: ICD-10-CM

## 2024-09-24 DIAGNOSIS — E03.9 HYPOTHYROIDISM, UNSPECIFIED TYPE: ICD-10-CM

## 2024-09-25 RX ORDER — LIOTHYRONINE SODIUM 5 UG/1
TABLET ORAL
Qty: 90 TABLET | Refills: 1 | Status: SHIPPED | OUTPATIENT
Start: 2024-09-25

## 2024-09-25 NOTE — TELEPHONE ENCOUNTER
Last Written Prescription Date:  9/6/23  Last Fill Quantity: 90,  # refills: 3   Last office visit: 9/6/2023 ; last virtual visit: Visit date not found with prescribing provider:  Dr. Her   Future Office Visit: 2/4/25    Requested Prescriptions   Pending Prescriptions Disp Refills    liothyronine (CYTOMEL) 5 MCG tablet [Pharmacy Med Name: LIOTHYRONINE 5MCG TABLETS] 90 tablet 3     Sig: TAKE 1 TABLET(5 MCG) BY MOUTH DAILY       Thyroid Protocol Failed - 9/24/2024  6:32 PM        Failed - Recent (12 mo) or future (30 days) visit within the authorizing provider's specialty     The patient must have completed an in-person or virtual visit within the past 12 months or has a future visit scheduled within the next 90 days with the authorizing provider s specialty.  Urgent care and e-visits do not quality as an office visit for this protocol.          Passed - Patient is 12 years or older        Passed - Medication is active on med list        Passed - Medication indicated for associated diagnosis     Medication is associated with one or more of the following diagnoses:  Hypothyroidism  Thyroid stimulating hormone suppression therapy  Thyroid cancer  Acquired atrophy of thyroid          Passed - Normal TSH on file in past 12 months     Recent Labs   Lab Test 08/02/24  1305   TSH 1.40              Passed - No active pregnancy on record     If patient is pregnant or has had a positive pregnancy test, please check TSH.          Passed - No positive pregnancy test in past 12 months     If patient is pregnant or has had a positive pregnancy test, please check TSH.             Refills sent  Claudia Patterson RN

## 2024-10-04 DIAGNOSIS — E03.9 HYPOTHYROIDISM, UNSPECIFIED TYPE: ICD-10-CM

## 2024-10-04 RX ORDER — LEVOTHYROXINE SODIUM 88 UG/1
88 TABLET ORAL DAILY
Qty: 90 TABLET | Refills: 1 | Status: SHIPPED | OUTPATIENT
Start: 2024-10-04

## 2024-10-04 NOTE — TELEPHONE ENCOUNTER
Last Written Prescription Date:  10/16/23  Last Fill Quantity: 90,  # refills: 3   Last office visit: 9/6/2023 ; last virtual visit: Visit date not found with prescribing provider:  Dr. Her   Future Office Visit: 2/4/25    Requested Prescriptions   Pending Prescriptions Disp Refills    levothyroxine (SYNTHROID/LEVOTHROID) 88 MCG tablet [Pharmacy Med Name: LEVOTHYROXINE 0.088MG (88MCG) TAB] 90 tablet 3     Sig: TAKE 1 TABLET(88 MCG) BY MOUTH DAILY       Thyroid Protocol Failed - 10/4/2024 10:32 AM        Failed - Recent (12 mo) or future (30 days) visit within the authorizing provider's specialty     The patient must have completed an in-person or virtual visit within the past 12 months or has a future visit scheduled within the next 90 days with the authorizing provider s specialty.  Urgent care and e-visits do not quality as an office visit for this protocol.          Passed - Patient is 12 years or older        Passed - Medication is active on med list        Passed - Medication indicated for associated diagnosis     Medication is associated with one or more of the following diagnoses:  Hypothyroidism  Thyroid stimulating hormone suppression therapy  Thyroid cancer  Acquired atrophy of thyroid          Passed - Normal TSH on file in past 12 months     Recent Labs   Lab Test 08/02/24  1305   TSH 1.40              Passed - No active pregnancy on record     If patient is pregnant or has had a positive pregnancy test, please check TSH.          Passed - No positive pregnancy test in past 12 months     If patient is pregnant or has had a positive pregnancy test, please check TSH.             Refills sent  Claudia Patterson RN

## 2024-10-15 ENCOUNTER — VIRTUAL VISIT (OUTPATIENT)
Dept: GASTROENTEROLOGY | Facility: CLINIC | Age: 44
End: 2024-10-15
Attending: NURSE PRACTITIONER
Payer: COMMERCIAL

## 2024-10-15 DIAGNOSIS — G89.29 CHRONIC PAIN OF BOTH EARS: ICD-10-CM

## 2024-10-15 DIAGNOSIS — H92.03 CHRONIC PAIN OF BOTH EARS: ICD-10-CM

## 2024-10-15 DIAGNOSIS — J31.2 CHRONIC SORE THROAT: ICD-10-CM

## 2024-10-15 DIAGNOSIS — K21.9 GASTROESOPHAGEAL REFLUX DISEASE WITHOUT ESOPHAGITIS: Primary | ICD-10-CM

## 2024-10-15 PROCEDURE — 99204 OFFICE O/P NEW MOD 45 MIN: CPT | Mod: 95 | Performed by: INTERNAL MEDICINE

## 2024-10-15 NOTE — NURSING NOTE
Current patient location: [address]  [place] [address]    Is the patient currently in the state of [place]? YES    Visit mode:VIDEO    If the visit is dropped, the patient can be reconnected by: VIDEO VISIT: Send to e-mail at: [email or web address]    Will anyone else be joining the visit? NO  (If patient encounters technical issues they should call [phone]    Are changes needed to the allergy or medication list? No    Are refills needed on medications prescribed by this physician? NO    Rooming Documentation:  Questionnaire(s) completed    Reason for visit: Consult    Louie CARO

## 2024-10-15 NOTE — PROGRESS NOTES
Gastroenterology Visit for: Dinorah Mai 1980   MRN: [id number]     Virtual Visit Details    Type of service:  Video Visit     Originating Location (pt. Location): Home    Distant Location (provider location):  On-site  Platform used for Video Visit: Marcelino    Reason for Visit:  chief complaint    Referred by: Pepito  / Keyshawn RAUSCH [facility] [address] / [place] [address]  Patient Care Team:  Juan Manuel Park DO as PCP - General (Family Medicine)  Lucas Kennedy MD as Referring Physician (Internal Medicine)  Ally Dyer APRN CNP as Nurse Practitioner (Neurology)  Haylie Her MD as Assigned Endocrinology Provider  Ally Dyer APRN CNP as Assigned Neuroscience Provider  Ana María Alicea MD as Assigned OBGYN Provider  Isabel Finney APRN CNP as Assigned Surgical Provider  Levar Peterson DO as Physician (Gastroenterology)  Alicia Beyer APRN CNP as Assigned PCP  Haylie Her MD as MD (Endocrinology, Diabetes, and Metabolism)    History of Present Illness:   Dinorah Mai is a 44 year old female with hypothyroid, constipation, migraine, depression who is presenting as a new patient to me previously underwent endoscopy within our division in consultation at the request of Dr. Beyer with a chief complaint of reflux.  ---------------------------------------------------------------  10/15/24  Dinorah Mai states she has been wanting to talk about acid reflux and if there is anything more she can do to help. She states it has been more than a year since endoscopy and has done everything she feels she can do on a normal diet and lifestyle basis. She feels it is a major issue in her family. Feels her reflux is hereditary. Grandfather  of throat cancer which they believe was related to reflux. Father is a physician and also has bad reflux.     She feels frustrated with having cut out a lot of her diet and with significant lifestyle changes. She  has tried OTC meds and doesn't feel they have worked. She has tried prilosec and had bad anxiety and frequent urination with the med along with bloating and constipation. Lansoprazole OTC worked better for ear aches and had doubled the dose however she lost benefit with the medication. She ended up having a yeast infection with it as well as increased hemorrhoids. Has tried pepcid with no relief.     Her symptoms are sore throat overnight and ear aches that keep her up. If she didn't have a restricted diet she would have heartburn. Denies current regurgitation. She finishes eating by 6pm and goes to bed at around 10pm and has her head of bed elevated.     The patient feels that her symptoms are related to reflux based on family history. Symptoms are still bothersome despite dietary and lifestyle measures. Eats low fat low acid diet.     Ear aches for 10 years.     See updated BEDQ and Eckardt score below: These patient reported outcomes are pertinent to the HPI and have personally been reviewed.    ---------------------------------------------------------------     Wt Readings from Last 5 Encounters:   08/12/24 54.6 kg (120 lb 6.4 oz)   07/16/24 54.9 kg (121 lb)   06/10/24 56.3 kg (124 lb 3.2 oz)   05/21/24 54.4 kg (120 lb)   05/06/24 55.3 kg (122 lb)        Esophageal Questionnaire(s)    BEDQ Questionnaire      10/14/2024     8:49 AM   BEDQ Questionnaire: How Often Have You Had the Following?   Trouble eating solid food (meat, bread, vegetables) 0   Trouble eating soft foods (yogurt, jello, pudding) 0   Trouble swallowing liquids 0   Pain while swallowing 0   Coughing or choking while swallowing foods or liquids 0   Total Score: 0         10/14/2024     8:49 AM   BEDQ Questionnaire: Discomfort/Pain Ratings   Eating solid food (meat, bread, vegetables) 0   Eating soft foods (yogurt, jello, pudding) 0   Drinking liquid 0   Total Score: 0       Eckardt Questionnaire      10/14/2024     8:50 AM   Britni Questionnaire    Dysphagia 0   Regurgitation 0   Retrosternal Pain 0   Weight Loss (kg) 0   Total Score:  0       Promis 10 Questionnaire      10/14/2024     8:51 AM   PROMIS 10 FLOWSHEET DATA   In general, would you say your health is: 2   In general, would you say your quality of life is: 2   In general, how would you rate your physical health? 2   In general, how would you rate your mental health, including your mood and your ability to think? 2   In general, how would you rate your satisfaction with your social activities and relationships? 4   In general, please rate how well you carry out your usual social activities and roles. (This includes activities at home, at work and in your community, and responsibilities as a parent, child, spouse, employee, friend, etc.) 3   To what extent are you able to carry out your everyday physical activities such as walking, climbing stairs, carrying groceries, or moving a chair? 4   In the past 7 days, how often have you been bothered by emotional problems such as feeling anxious, depressed, or irritable? 2   In the past 7 days, how would you rate your fatigue on average? 3   In the past 7 days, how would you rate your pain on average, where 0 means no pain, and 10 means worst imaginable pain? 7   Mental health question re-calculation - no clinical value 4   Physical health question re-calculation - no clinical value 3   Pain question re-calculation - no clinical value 2   Global Mental Health Score 12   Global Physical Health Score 11   PROMIS TOTAL - SUBSCORES 23           STUDIES & PROCEDURES:    EGD:   Date: 10/18/23  Impression:   Mucosal changes including intermittent subtle feline appearance and        longitudinal furrows were found in the middle third of the esophagus and        in the lower third of the esophagus. Biopsies were taken with a cold        forceps for histology in 4 quadrants 2 cm and 10 cm proximal to the GE        junction.        The gastroesophageal flap valve was  visualized endoscopically and        classified as Hill Grade III (minimal fold, loose to endoscope, hiatal        hernia likely).        The stomach was normal.        The examined duodenum was normal.                                                                                    Impression:            - Esophageal mucosal changes equivocal for                          eosinophilic esophagitis. Biopsied.                          - No endoscopically identifiable reflux esophagitis.                          - Gastroesophageal flap valve classified as Hill Grade                          III (minimal fold, loose to endoscope, hiatal hernia                          likely).                          - Normal stomach.                          - Normal examined duodenum.   Pathology Report:  A.  DISTAL ESOPHAGUS, BIOPSY:  -Mildly active esophagitis with peak eosinophil count of 17/HPF (patchy distribution)  -Negative for intestinal metaplasia and dysplasia     B.  MID ESOPHAGUS, BIOPSY:  -Mildly active esophagitis with peak eosinophil count of 6/HPF (patchy distribution)  -Negative for intestinal metaplasia and dysplasia    Colonoscopy:  Date:  Impression:  Pathology Report:     EndoFLIP directed at the UES or LES (8cm (EF-325) balloon length or 16cm (EF-322) balloon length):   Date:  8cm balloon  Balloon inflation Balloon pressure CSA (mm^2) DI (mm^2/mmHg) Dmin (mm) Compliance   20 (ladmark ID)        30        40        50           16cm balloon  Balloon inflation Balloon pressure CSA (mm^2) DI (mm^2/mmHg) Dmin (mm) Compliance   30 (ladmark ID)        40        50        60        70           High Resolution Manometry:  Date:  Impression:    PH/Impedance:  Date:  Impression:     Bravo:  48 or 96hr  Date:  Impression:    CT:  Date:  Impression:    Esophagram:  Date:  Impression:    Modified Barium Esophagram:  Date:  Impression:     Prior medical records were reviewed including, but not limited to, notes from  referring providers, lab work, radiographic tests, and other diagnostic tests. Pertinent results were summarized above.     History     Past Medical History:   Diagnosis Date    Ear pain 02/25/2019    Hypothyroidism due to Hashimoto's thyroiditis        Past Surgical History:   Procedure Laterality Date    ESOPHAGOSCOPY, GASTROSCOPY, DUODENOSCOPY (EGD), COMBINED N/A 10/18/2023    Procedure: ESOPHAGOGASTRODUODENOSCOPY, WITH BIOPSY;  Surgeon: Kostas Bradford MD;  Location:  GI       Social History     Socioeconomic History    Marital status:      Spouse name: Not on file    Number of children: Not on file    Years of education: Not on file    Highest education level: Not on file   Occupational History    Not on file   Tobacco Use    Smoking status: Never    Smokeless tobacco: Never   Vaping Use    Vaping status: Never Used   Substance and Sexual Activity    Alcohol use: Yes     Comment: 2 drinks a week     Drug use: Never    Sexual activity: Yes     Partners: Male     Birth control/protection: None   Other Topics Concern    Parent/sibling w/ CABG, MI or angioplasty before 65F 55M? No   Social History Narrative    Not on file     Social Determinants of Health     Financial Resource Strain: Low Risk  (8/8/2024)    Financial Resource Strain     Within the past 12 months, have you or your family members you live with been unable to get utilities (heat, electricity) when it was really needed?: No   Food Insecurity: Low Risk  (8/8/2024)    Food Insecurity     Within the past 12 months, did you worry that your food would run out before you got money to buy more?: No     Within the past 12 months, did the food you bought just not last and you didn t have money to get more?: No   Transportation Needs: Low Risk  (8/8/2024)    Transportation Needs     Within the past 12 months, has lack of transportation kept you from medical appointments, getting your medicines, non-medical meetings or appointments, work, or from  getting things that you need?: No   Physical Activity: Sufficiently Active (8/8/2024)    Exercise Vital Sign     Days of Exercise per Week: 6 days     Minutes of Exercise per Session: 40 min   Stress: Stress Concern Present (8/8/2024)    [employer] [place] [employer] - Occupational Stress Questionnaire     Feeling of Stress : To some extent   Social Connections: Unknown (8/8/2024)    Social Connection and Isolation Panel [NHANES]     Frequency of Communication with Friends and Family: Not on file     Frequency of Social Gatherings with Friends and Family: More than three times a week     Attends [employer] Services: Not on file     Active Member of Clubs or Organizations: Not on file     Attends Club or Organization Meetings: Not on file     Marital Status: Not on file   Interpersonal Safety: Low Risk  (5/21/2024)    Interpersonal Safety     Do you feel physically and emotionally safe where you currently live?: Yes     Within the past 12 months, have you been hit, slapped, kicked or otherwise physically hurt by someone?: No     Within the past 12 months, have you been humiliated or emotionally abused in other ways by your partner or ex-partner?: No   Housing Stability: Low Risk  (8/8/2024)    Housing Stability     Do you have housing? : Yes     Are you worried about losing your housing?: No       Family History   Problem Relation Age of Onset    ALS Mother     Skin Cancer Father     Breast Cancer Maternal Grandmother      Family history reviewed and edited as appropriate    Medications and Allergies:     Outpatient Encounter Medications as of 10/15/2024   Medication Sig Dispense Refill    Vonoprazan Fumarate 10 MG TABS Take 1 tablet by mouth daily. 90 tablet 3    amitriptyline (ELAVIL) 10 MG tablet Take 1 tablet (10 mg) by mouth at bedtime 90 tablet 4    baclofen (LIORESAL) 10 MG tablet Take 1 tablet (10 mg) by mouth 3 times daily as needed for muscle spasms 20 tablet 1    cholecalciferol 25 MCG (1000 UT)  TABS       FLUoxetine (PROZAC) 10 MG capsule Take 1 capsule (10 mg) by mouth daily. 90 capsule 2    folic acid (FOLVITE) 1 MG tablet TAKE 1 TABLET(1 MG) BY MOUTH DAILY 90 tablet 3    levothyroxine (SYNTHROID/LEVOTHROID) 88 MCG tablet TAKE 1 TABLET(88 MCG) BY MOUTH DAILY 90 tablet 1    linaclotide (LINZESS) 72 MCG capsule Take 1 capsule (72 mcg) by mouth every morning (before breakfast) 90 capsule 3    liothyronine (CYTOMEL) 5 MCG tablet TAKE 1 TABLET(5 MCG) BY MOUTH DAILY 90 tablet 1    MAGNESIUM GLYCINATE PO Take 4 capsules by mouth At Bedtime      naratriptan (AMERGE) 2.5 MG tablet Take 1 tablet (2.5 mg) by mouth at onset of headache for migraine May repeat in 4 hours. Max 2 tablets/24 hours. 18 tablet 9    rimegepant (NURTEC) 75 MG ODT tablet Place 1 tablet (75 mg) under the tongue every 48 hours 16 tablet 11    topiramate (TOPAMAX) 50 MG tablet Take 1 tablet (50 mg) by mouth 2 times daily 180 tablet 3    tranexamic acid (LYSTEDA) 650 MG tablet Take 2 tablets three times daily for up to 5 days, starting with onset of heavy menstruation. 30 tablet 11     Facility-Administered Encounter Medications as of 10/15/2024   Medication Dose Route Frequency Provider Last Rate Last Admin    Botulinum Toxin Type A (BOTOX) 200 units injection 155 Units  155 Units Intramuscular See Admin Instructions    155 Units at 09/05/24 1415        No Known Allergies     Review of systems:  A full 10 point review of systems was obtained and was negative except for the pertinent positives and negatives stated within the HPI.    Objective Findings:   Physical Exam:    Constitutional: There were no vitals taken for this visit.  General: Alert, cooperative, no distress, well-appearing  Head: Atraumatic, normocephalic, no obvious abnormalities   Eyes: EOMI, Sclera anicteric, no obvious conjunctival hemorrhage   Nose: Nares normal, no obvious malformation, no obvious rhinorrhea   Respiratory: normal appearing, no cough  Musculoskeletal: Range of  motion intact, no obvious strength deficit  Skin: No jaundice, no obvious rash  Neurologic: AAOx3, no obvious neurologic abnormality  Psychiatric: Normal Affect, appropriate mood  Extremities: No obvious edema, no obvious malformation     Labs, Radiology, Pathology     Lab Results   Component Value Date    WBC 6.8 04/07/2021    HGB 14.0 04/07/2021     04/07/2021    ALT 25 04/07/2021    AST 24 04/07/2021     04/07/2021    BUN 11 04/07/2021    CO2 26 04/07/2021    TSH 1.40 08/02/2024    TSH 2.45 09/06/2023    TSH 2.63 02/07/2023    INR 1.01 04/07/2021        Liver Function Studies -   Recent Labs   Lab Test 04/07/21  1143   PROTTOTAL 7.5   ALBUMIN 4.4   BILITOTAL 0.4   ALKPHOS 47   AST 24   ALT 25          Patient Active Problem List    Diagnosis Date Noted    Gastroesophageal reflux disease without esophagitis 10/15/2024     Priority: Medium    Chronic sore throat 10/15/2024     Priority: Medium    Chronic pain of both ears 10/15/2024     Priority: Medium    Gastroesophageal reflux disease with esophagitis without hemorrhage 08/12/2024     Priority: Medium    Eosinophilic esophagitis 08/12/2024     Priority: Medium    Chronic migraine without aura without status migrainosus, not intractable 03/29/2022     Priority: Medium    PMDD (premenstrual dysphoric disorder) 04/07/2021     Priority: Medium    Menorrhagia with regular cycle 04/07/2021     Priority: Medium    Hypothyroidism due to Hashimoto's thyroiditis 04/07/2021     Priority: Medium      Assessment and Plan   Assessment:    Dinorah Mai is a 44 year old female with hypothyroid, constipation, migraine, depression who is presenting as a new patient to me previously underwent endoscopy within our division in consultation at the request of Dr. Beyer with a chief complaint of reflux.    The patient was seen regarding concerns about her reflux. She does notably have findings on endoscopy and biopsy that can be seen with EoE however clinically she  has no symptoms of dysphagia and she believes strongly that her symptoms of ear ache are related to reflux. I discussed wireless vs pH impedance testing with a preference for the catheter based approach given the atypical extraesophageal symptoms. At this time the patient is not interested and would like to try a different medication. She has not tolerated omeprazole and did not have lasting relief with lansoprazole. She has no erosive esophagitis on endoscopy. We discussed changing PPI to a different one such as rabeprazole, trying other medications such as alginate products, and lastly discussed vonoprazan. The patient was interested in vonoprazan 10mg daily. I reviewed the prescribing information with her and also recommended that she inform me how she is doing through [facility] [employer] after 1 month of medication. If she has a benefit we can consider continuing the medication vs as needed dosing. If there is no benefit I will again encourage her to pursue objective pH testing with HRM/pH impedance. Future endoscopy may be warranted as well to re-evaluate the eosinophils and if she has eoe.    1. Gastroesophageal reflux disease without esophagitis    2. Chronic sore throat    3. Chronic pain of both ears       No orders of the defined types were placed in this encounter.       Plan:  We will try a new medication called voquezna (vonoprazan). This is a potassium competitive acid blocker (PCAB). Please take 10mg daily. This does not require a meal to be taken.   Please message me on [employer] in 1 month after beginning the med to provide an update on symptoms. If it is not working we can discontinue the medication.   Here is the website where you may be able to get a copay card if needed: [email or web address]   Future testing could include manometry and pH impedance testing.   We discussed the possibility of EoE based on endoscopy and biopsy results. Those findings could also be seen in the setting of reflux.    Follow up  plan:   Return to clinic 6 months and as needed.    The risks and benefits of my recommendations, as well as other treatment options were discussed with the patient and any available family today. All questions were answered.     Follow up: As planned above. Today, I personally spent 35 minutes in direct face to face time with the patient, of which greater than 50% of the time was spent in patient education and counseling as described above. Approximately 15 minutes were spent on indirect care associated with the patient's consultation including but not limited to review of: patient medical records to date, clinic visits, hospital records, lab results, imaging studies, procedural documentation, and coordinating care with other providers. The findings from this review are summarized in the above note. All of the above accounted for a cumulative time of 50 minutes and was performed on the date of service.     The patient verbalized understanding of the plan and was appreciative for the time spent and information provided during the office visit.     Author:   Levar Peterson DO   of Medicine  Director, Esophageal Disorders Program  Division of Gastroenterology, Hepatology, and Nutrition  [facility]    Dr. Peterson speaks for [employer] regarding dupilumab and has participated in advisory boards for the medication. He receives income for these activities. When discussing the medication, patients were informed of Dr. Peterson's role and potential conflict of interest. All questions and/or concerns were answered during the encounter.    Dr. Peterson speaks for [employer] regarding vonoprazan. He receives income for these activities. When discussing the medication, patients were informed of Dr. Peterson's role and potential conflict of interest. All questions and/or concerns were answered during the encounter.     Documentation assisted by voice recognition and documentation  system.

## 2024-10-15 NOTE — PATIENT INSTRUCTIONS
It was a pleasure taking care of you today.  I've included a brief summary of our discussion and care plan from today's visit below.  Please review this information with your primary care provider.        We will try a new medication called voquezna (vonoprazan). This is a potassium competitive acid blocker (PCAB). Please take 10mg daily. This does not require a meal to be taken.   Please message me on [employer] in 1 month after beginning the med to provide an update on symptoms. If it is not working we can discontinue the medication.   Here is the website where you may be able to get a copay card if needed: [email or web address]   Future testing could include manometry and pH impedance testing.   We discussed the possibility of EoE based on endoscopy and biopsy results. Those findings could also be seen in the setting of reflux.      Please call my nurse care coordinator Kami ([phone]) or Carmina ([phone]), with any questions or concerns.    If you feel you received exceptional care and are interested in supporting the clinical and research goals of Dr. Peterson or the Division of Gastroenterology, Hepatology, and Nutrition please contact him directly through [employer]  to discuss opportunities to donate.    Sincerely,    Levar Peterson DO   of Medicine  Director, Esophageal Disorders Program  Division of Gastroenterology, Hepatology, and Nutrition  [facility]      Please see below for any additional questions and scheduling guidelines.    Sign up for [employer]: [employer] patient portal serves as a secure platform for accessing your medical records from the [facility]. Additionally, [employer] facilitates easy, timely, and secure messaging with your care team. If you have not signed up, you may do so by using the provided code or calling [phone].    Coordinating your care after your visit:  There are multiple options for scheduling your follow-up care based on your  provider's recommendation.    How do I schedule a follow-up clinic appointment:   After your appointment, you may receive scheduling assistance with the Clinic Coordinators by having a seat in the waiting room and a Clinic Coordinator will call you up to schedule.  Virtual visits or after you leave the clinic:  Your provider has placed a follow-up order in the [employer] portal for scheduling your return appointment. A member of the scheduling team will contact you to schedule.  [facility] Scheduling: Timely scheduling through [employer] is advised to ensure appointment availability.   Call to schedule: You may schedule your follow-up appointment(s) by calling [phone], option 1.    How do I schedule my endoscopy or colonoscopy procedure:  If a procedure, such as a colonoscopy or upper endoscopy was ordered by your provider, the scheduling team will contact you to schedule this procedure. Or you may choose to call to schedule at   [phone], option 2.  Please allow 20-30 minutes when scheduling a procedure.    How do I get my blood work done? To get your blood work done, you need to schedule a lab appointment at an [facility]. There are multiple ways to schedule:   At the clinic: The Clinic Coordinator you meet after your visit can help you schedule a lab appointment.   [facility] scheduling: [employer] offers online lab scheduling at all [facility] laboratory locations.   Call to schedule: You can call [phone] to schedule your lab appointment.    How do I schedule my imaging study: To schedule imaging studies, such as CT scans, ultrasounds, MRIs, or X-rays, contact Imaging Services at [phone].    How do I schedule a referral to another doctor: If your provider recommended a referral to another specialist(s), the referral order was placed by your provider. You will receive a phone call to schedule this referral, or you may choose to call the number attached to the referral to  self-schedule.    For Post-Visit Question(s):  For any inquiries following today's visit:  Please utilize "[employer]" messaging and allow 48 hours for reply or contact the Call Center during normal business hours at [phone], option 3.  For Emergent After-hours questions, contact the On-Call GI Fellow through the [facility] at [phone].  In addition, you may contact your Nurse directly using the provided contact information.    Test Results:  Test results will be accessible via "[employer]" in compliance with the 21st Century Cures Act. This means that your results will be available to you at the same time as your provider. Often you may see your results before your provider does. Results are reviewed by staff within two weeks with communication follow-up. Results may be released in the patient portal prior to your care team review.    Prescription Refill(s):  Medication prescribed by your provider will be addressed during your visit. For future refills, please coordinate with your pharmacy. If you have not had a recent clinic visit or routine labs, for your safety, your provider may not be able to refill your prescription.

## 2024-10-21 ENCOUNTER — MYC MEDICAL ADVICE (OUTPATIENT)
Dept: GASTROENTEROLOGY | Facility: CLINIC | Age: 44
End: 2024-10-21
Payer: COMMERCIAL

## 2024-10-22 ENCOUNTER — VIRTUAL VISIT (OUTPATIENT)
Dept: MIDWIFE SERVICES | Facility: CLINIC | Age: 44
End: 2024-10-22
Payer: COMMERCIAL

## 2024-10-22 DIAGNOSIS — Z30.011 BCP (BIRTH CONTROL PILLS) INITIATION: Primary | ICD-10-CM

## 2024-10-22 PROCEDURE — 99213 OFFICE O/P EST LOW 20 MIN: CPT | Mod: 95 | Performed by: ADVANCED PRACTICE MIDWIFE

## 2024-10-22 RX ORDER — LEVONORGESTREL/ETHIN.ESTRADIOL 0.1-0.02MG
1 TABLET ORAL DAILY
Qty: 84 TABLET | Refills: 0 | Status: SHIPPED | OUTPATIENT
Start: 2024-10-22

## 2024-10-22 NOTE — PROGRESS NOTES
"Dinorah is a 44 year old who is being evaluated via a billable video visit.    How would you like to obtain your AVS? MyChart  If the video visit is dropped, the invitation should be resent by: Text to cell phone: [phone]  Will anyone else be joining your video visit? No      Subjective   Dinorah is a 44 year old, presenting for the following health issues:  Contraception  She would like to try hormonal birth control again. She has one partner.  He has vasectomy and they have no concerns about STIs. But she struggles with heavy periods, cramps, migraines due to hormone changes and mood problems due to her cycles. She says she is \"out of commission\" about 3 days every month.  She also discussed this with Dr. Alicea.  She has used Lyseeda for bleeding, bleeding seemed less but longer.       Objective           Vitals:  No vitals were obtained today due to virtual visit.    Physical Exam   GENERAL: alert and no distress  EYES: Eyes grossly normal to inspection.  No discharge or erythema, or obvious scleral/conjunctival abnormalities.  RESP: No audible wheeze, cough, or visible cyanosis.    SKIN: Visible skin clear. No significant rash, abnormal pigmentation or lesions.  NEURO: Cranial nerves grossly intact.  Mentation and speech appropriate for age.  PSYCH: Appropriate affect, tone, and pace of words    UPT not needed - denies any risk of pregnancy due to 's vasectomy   STI testing offered and declined    Assessment/Plan:    (Z30.011) BCP (birth control pills) initiation  (primary encounter diagnosis)  Plan: levonorgestrel-ethinyl estradiol (AVIANE)         0.1-20 MG-MCG tablet  Reviewed past note from Dr. Alicea.    Reviewed contraindications to oral contraceptives.  She has migraines, but denies any aura.  Has been on hormonal birth control in the past. Denies any hypertension, DVT, Stroke or any other contradictions. Discussed risks and benefits, common side effects and warning signs. Please get the " education from the pharmacist at first .  3 months of pills ordered.  Please make an in person appt with in 3 months to check BP and follow up.  At that time we will order more refills.            Video-Visit Details    Type of service:  Video Visit   Originating Location (pt. Location): Home    Distant Location (provider location):  On-site  Platform used for Video Visit: [employer]  Signed Electronically by: Bessie Tong CNM

## 2024-10-23 ENCOUNTER — TELEPHONE (OUTPATIENT)
Dept: GASTROENTEROLOGY | Facility: CLINIC | Age: 44
End: 2024-10-23

## 2024-11-11 DIAGNOSIS — G43.709 CHRONIC MIGRAINE WITHOUT AURA WITHOUT STATUS MIGRAINOSUS, NOT INTRACTABLE: ICD-10-CM

## 2024-11-11 NOTE — TELEPHONE ENCOUNTER
Rx Authorization:  Requested Medication/ Dose naratriptan (AMERGE) 2.5 MG tablet   Date last refill ordered: 10/13/23  Quantity ordered: 18 tablets  # refills: 9  Date of last clinic visit with ordering provider: 9/14/22  Date of next clinic visit with ordering provider:   All pertinent protocol data (lab date/result):   Include pertinent information from patients message:

## 2024-11-13 RX ORDER — NARATRIPTAN 2.5 MG/1
2.5 TABLET ORAL
Qty: 18 TABLET | Refills: 9 | Status: SHIPPED | OUTPATIENT
Start: 2024-11-13

## 2024-11-19 ENCOUNTER — VIRTUAL VISIT (OUTPATIENT)
Dept: ENDOCRINOLOGY | Facility: CLINIC | Age: 44
End: 2024-11-19
Payer: COMMERCIAL

## 2024-11-19 DIAGNOSIS — E06.3 HYPOTHYROIDISM DUE TO HASHIMOTO'S THYROIDITIS: ICD-10-CM

## 2024-11-19 DIAGNOSIS — E04.1 SOLITARY THYROID NODULE: Primary | ICD-10-CM

## 2024-11-19 DIAGNOSIS — E03.9 HYPOTHYROIDISM, UNSPECIFIED TYPE: ICD-10-CM

## 2024-11-19 RX ORDER — LEVOTHYROXINE SODIUM 88 UG/1
88 TABLET ORAL DAILY
Qty: 90 TABLET | Refills: 3 | Status: SHIPPED | OUTPATIENT
Start: 2024-11-19

## 2024-11-19 RX ORDER — LIOTHYRONINE SODIUM 5 UG/1
TABLET ORAL
Qty: 90 TABLET | Refills: 3 | Status: SHIPPED | OUTPATIENT
Start: 2024-11-19

## 2024-11-19 NOTE — LETTER
11/19/2024      Dinorah Mai  [address]  [place] [address]      Dear Colleague,    Thank you for referring your patient, Dinorah Mai, to the [facility] [place]. Please see a copy of my visit note below.    Video-Visit Details    Type of service:  Video Visit  Video Start Time: 3:33  Video End Time:3:45  Originating Location (pt. Location): Home, [place]  Distant Location (provider location):  Home  Platform used for Video Visit: [employer]      Dinorah Mai is a 44 year old yo female who presents today for follow-up of Hashimoto's Hypothyroidism, and thyroid nodule via a billable video visit.. She also has PMHx of chronic migraine, menorrhagia. Last seen by me Sept 2023    Chief Complaint   Patient presents with     RECHECK     Hypothyroidism due to Hashimoto's thyroiditis +1 more       1) Hashimoto's Hypothyroidism  Diagnosis 10 years ago had delivered second daughter and was unable to nurse. Attributes this as the start of her thyroid issues. A few years later, was having, headaches, migraines, weight gain, hair loss, fatigue and checked thyroid function and found to have hypothyroidism. Diagnosed/treated in [place]. Endo there also completed ultrasound and noted solitary subcentimeter nodule that was monitored every few years    Treatment Levothyroxine 75mcg daily, Liothyronine 5mcg    Imaging thyroid ultrasound- last 5/2020, small subcentimeter nodule that per report was not visualized prior to this.    Thyroid ROS INTERVAL:  - Overall improvement in fatigue, constipation, weight  - Started Linzess- has helped substantially  - Some andrea-menopause symptoms, working with OB for this  -      2) Solitary Thyroid Nodule  Seen on thyroid ultrasound 5/2020, not previously identified  Last ultrasound 9/2022- TIRADS 3 subcentimeter L side, remaining heterogenous thyroid  Denies any difficulty breathing, difficulty swallowing, shortness of breath, enlargement of neck/thyroid, family  history of thyroid cancer, exposure to radiation, hoarseness.      INTERVAL HISTORY:  - has GERD, had EGD last time, but no trouble swallowing etc  Denies any difficulty breathing, difficulty swallowing, shortness of breath, enlargement of neck/thyroid, family history of thyroid cancer, exposure to radiation, hoarseness or weight loss.       Active diagnoses this visit:  Solitary thyroid nodule     ROS: 10 point ROS neg other than the symptoms noted above in the HPI.      Medical, surgical, social, and family histories, medications and allergies reviewed and updated.  Past Medical History:   Diagnosis Date     Ear pain 02/25/2019     Hypothyroidism due to Hashimoto's thyroiditis        Past Surgical History:   Procedure Laterality Date     ESOPHAGOSCOPY, GASTROSCOPY, DUODENOSCOPY (EGD), COMBINED N/A 10/18/2023    Procedure: ESOPHAGOGASTRODUODENOSCOPY, WITH BIOPSY;  Surgeon: Kostas Bradford MD;  Location:  GI       Allergies:  Patient has no known allergies.    Social History     Tobacco Use     Smoking status: Never     Smokeless tobacco: Never   Substance Use Topics     Alcohol use: Yes     Comment: 2 drinks a week        Family History   Problem Relation Age of Onset     ALS Mother      Skin Cancer Father      Breast Cancer Maternal Grandmother          Objective:  There were no vitals taken for this visit.    Exam:  Physical Exam (visual exam)  VS:  no vital signs taken for video visit  CONSTITUTIONAL: healthy, alert and NAD, responding appropriately  ENT: normocephalic, no visual evidence of trauma, normal nose and oral mucosa  EYES: conjunctivae and sclerae normal, no exophthalmos or proptosis  THYROID:  no visualized nodules or goiter  LUNGS: no audible wheeze, cough or visible cyanosis, no visible retractions or increased work of breathing  EXTREMITIES: no hand tremors  NEUROLOGY: cranial nerves grossly intact with no obvious deficit.  SKIN:  no visualized skin lesions or rash, no edema  visualized  PSYCH: mentation appears normal, normal judgement        Lab Results   Component Value Date/Time    TSH 1.40 08/02/2024 01:05 PM    TSH 2.23 09/20/2022 01:34 PM    T4 1.32 08/02/2024 01:05 PM    ESTROGEN 54 04/07/2021 11:43 AM    FSH 5.1 04/07/2021 11:43 AM     Last Comprehensive Metabolic Panel:  Sodium   Date Value Ref Range Status   04/07/2021 141 136 - 145 mmol/L Final     Potassium   Date Value Ref Range Status   04/07/2021 4.3 3.5 - 5.0 mmol/L Final     Chloride   Date Value Ref Range Status   04/07/2021 104 98 - 107 mmol/L Final     Carbon Dioxide (CO2)   Date Value Ref Range Status   04/07/2021 26 22 - 31 mmol/L Final     Anion Gap   Date Value Ref Range Status   04/07/2021 11 5 - 18 mmol/L Final     Glucose   Date Value Ref Range Status   04/07/2021 92 70 - 125 mg/dL Final     Urea Nitrogen   Date Value Ref Range Status   04/07/2021 11 8 - 22 mg/dL Final     Creatinine   Date Value Ref Range Status   04/07/2021 0.77 0.60 - 1.10 mg/dL Final     GFR Estimate   Date Value Ref Range Status   04/07/2021 >60 >60 mL/min/1.73m2 Final     Calcium   Date Value Ref Range Status   04/07/2021 9.6 8.5 - 10.5 mg/dL Final       Thyroid ultrasound 5/27/2020:      ASSESSMENT / PLAN:      1) Hashimoto's Thyroiditis  - Clinically and biochemically euthyroid  - Continue current dose levothyroxine 75 mcg and liothyronine 5mcg daily      2) Solitary Thyroid Nodule  - Recheck thyroid ultrasound now-- if stable no further testing        Orders Placed This Encounter   Procedures     US Thyroid         RTC PRN- ok to return to PCP or annually here if wants me to fill thyroid meds    A total of 20 minutes were spent today 11/19/24 on this visit including chart review, history and counseling, documentation and other activities as detailed above.       Again, thank you for allowing me to participate in the care of your patient.        Sincerely,        Haylie Her MD

## 2024-11-19 NOTE — PROGRESS NOTES
Video-Visit Details    Type of service:  Video Visit  Video Start Time: 3:33  Video End Time:3:45  Originating Location (pt. Location): Home, [place]  Distant Location (provider location):  Home  Platform used for Video Visit: Marcelino Mai is a 44 year old yo female who presents today for follow-up of Hashimoto's Hypothyroidism, and thyroid nodule via a billable video visit.. She also has PMHx of chronic migraine, menorrhagia. Last seen by me Sept 2023    Chief Complaint   Patient presents with    RECHECK     Hypothyroidism due to Hashimoto's thyroiditis +1 more       1) Hashimoto's Hypothyroidism  Diagnosis 10 years ago had delivered second daughter and was unable to nurse. Attributes this as the start of her thyroid issues. A few years later, was having, headaches, migraines, weight gain, hair loss, fatigue and checked thyroid function and found to have hypothyroidism. Diagnosed/treated in [place]. Endo there also completed ultrasound and noted solitary subcentimeter nodule that was monitored every few years    Treatment Levothyroxine 75mcg daily, Liothyronine 5mcg    Imaging thyroid ultrasound- last 5/2020, small subcentimeter nodule that per report was not visualized prior to this.    Thyroid ROS INTERVAL:  - Overall improvement in fatigue, constipation, weight  - Started Linzess- has helped substantially  - Some andrea-menopause symptoms, working with OB for this  -      2) Solitary Thyroid Nodule  Seen on thyroid ultrasound 5/2020, not previously identified  Last ultrasound 9/2022- TIRADS 3 subcentimeter L side, remaining heterogenous thyroid  Denies any difficulty breathing, difficulty swallowing, shortness of breath, enlargement of neck/thyroid, family history of thyroid cancer, exposure to radiation, hoarseness.      INTERVAL HISTORY:  - has GERD, had EGD last time, but no trouble swallowing etc  Denies any difficulty breathing, difficulty swallowing, shortness of breath, enlargement of  neck/thyroid, family history of thyroid cancer, exposure to radiation, hoarseness or weight loss.       Active diagnoses this visit:  Solitary thyroid nodule     ROS: 10 point ROS neg other than the symptoms noted above in the HPI.      Medical, surgical, social, and family histories, medications and allergies reviewed and updated.  Past Medical History:   Diagnosis Date    Ear pain 02/25/2019    Hypothyroidism due to Hashimoto's thyroiditis        Past Surgical History:   Procedure Laterality Date    ESOPHAGOSCOPY, GASTROSCOPY, DUODENOSCOPY (EGD), COMBINED N/A 10/18/2023    Procedure: ESOPHAGOGASTRODUODENOSCOPY, WITH BIOPSY;  Surgeon: Kostas Bradford MD;  Location:  GI       Allergies:  Patient has no known allergies.    Social History     Tobacco Use    Smoking status: Never    Smokeless tobacco: Never   Substance Use Topics    Alcohol use: Yes     Comment: 2 drinks a week        Family History   Problem Relation Age of Onset    ALS Mother     Skin Cancer Father     Breast Cancer Maternal Grandmother          Objective:  There were no vitals taken for this visit.    Exam:  Physical Exam (visual exam)  VS:  no vital signs taken for video visit  CONSTITUTIONAL: healthy, alert and NAD, responding appropriately  ENT: normocephalic, no visual evidence of trauma, normal nose and oral mucosa  EYES: conjunctivae and sclerae normal, no exophthalmos or proptosis  THYROID:  no visualized nodules or goiter  LUNGS: no audible wheeze, cough or visible cyanosis, no visible retractions or increased work of breathing  EXTREMITIES: no hand tremors  NEUROLOGY: cranial nerves grossly intact with no obvious deficit.  SKIN:  no visualized skin lesions or rash, no edema visualized  PSYCH: mentation appears normal, normal judgement        Lab Results   Component Value Date/Time    TSH 1.40 08/02/2024 01:05 PM    TSH 2.23 09/20/2022 01:34 PM    T4 1.32 08/02/2024 01:05 PM    ESTROGEN 54 04/07/2021 11:43 AM    FSH 5.1 04/07/2021  11:43 AM     Last Comprehensive Metabolic Panel:  Sodium   Date Value Ref Range Status   04/07/2021 141 136 - 145 mmol/L Final     Potassium   Date Value Ref Range Status   04/07/2021 4.3 3.5 - 5.0 mmol/L Final     Chloride   Date Value Ref Range Status   04/07/2021 104 98 - 107 mmol/L Final     Carbon Dioxide (CO2)   Date Value Ref Range Status   04/07/2021 26 22 - 31 mmol/L Final     Anion Gap   Date Value Ref Range Status   04/07/2021 11 5 - 18 mmol/L Final     Glucose   Date Value Ref Range Status   04/07/2021 92 70 - 125 mg/dL Final     Urea Nitrogen   Date Value Ref Range Status   04/07/2021 11 8 - 22 mg/dL Final     Creatinine   Date Value Ref Range Status   04/07/2021 0.77 0.60 - 1.10 mg/dL Final     GFR Estimate   Date Value Ref Range Status   04/07/2021 >60 >60 mL/min/1.73m2 Final     Calcium   Date Value Ref Range Status   04/07/2021 9.6 8.5 - 10.5 mg/dL Final       Thyroid ultrasound 5/27/2020:      ASSESSMENT / PLAN:      1) Hashimoto's Thyroiditis  - Clinically and biochemically euthyroid  - Continue current dose levothyroxine 75 mcg and liothyronine 5mcg daily      2) Solitary Thyroid Nodule  - Recheck thyroid ultrasound now-- if stable no further testing        Orders Placed This Encounter   Procedures    US Thyroid         RTC PRN- ok to return to PCP or annually here if wants me to fill thyroid meds    A total of 20 minutes were spent today 11/19/24 on this visit including chart review, history and counseling, documentation and other activities as detailed above.

## 2024-11-21 ENCOUNTER — OFFICE VISIT (OUTPATIENT)
Dept: DERMATOLOGY | Facility: CLINIC | Age: 44
End: 2024-11-21
Attending: NURSE PRACTITIONER
Payer: COMMERCIAL

## 2024-11-21 DIAGNOSIS — L30.9 DERMATITIS OF LIP: Primary | ICD-10-CM

## 2024-11-21 RX ORDER — HYDROCORTISONE 25 MG/G
OINTMENT TOPICAL 2 TIMES DAILY
Qty: 20 G | Refills: 1 | Status: SHIPPED | OUTPATIENT
Start: 2024-11-21

## 2024-11-21 RX ORDER — KETOCONAZOLE 20 MG/G
CREAM TOPICAL
Qty: 15 G | Refills: 1 | Status: SHIPPED | OUTPATIENT
Start: 2024-11-21

## 2024-11-21 NOTE — PROGRESS NOTES
[facility] Note  Encounter Date: Nov 21, 2024  Office Visit     Reviewed patients past medical history and pertinent chart review prior to patients visit today.     Dermatology Problem List:  Recurrent lip dermatitis  -ketoconazole 2% cream and hydrocortisone ointment given for flares 11/21/2024     ____________________________________________    Assessment & Plan:     # Lip dermatitis, etiology unknown.  We discussed eliminating the products they are using on the face and hands.   I have advised patient to use  Hydrocortisone 2.5% ointment 2 times daily and ketoconazole 2% cream once daily when rash is flared until resolved. With flares, only use vaseline or Aquaphor ointment on the face and hands. Once rash has been resolved for 2 to 3 weeks then it is okay to start adding in any products they like to use on their face.  Patient will only add one product every 2 weeks so that if the lips react which may give them a better idea of what is causing the rash.  If patient has not achieved full resolution in 1 month they will return to clinic for reevaluation.     Follow up ALESHIA Finney, LELIA  Dermatology    _______________________________________    CC: Derm Problem (Angular chelitis often on mouth/Nystatin orally and seems to help ( over the last 10 years) about 2 x a year ( had gel too and seemed to help) )    HPI:  Ms. Dinorah Mai is a(n) 44 year old female who presents today as a new patient for lip rash that comes and goes about twice per year for the past 10 years. She has not been able to connect it with any certain products, foods, etc. She has used oral nystatin from [employer] which she does think helps the rash heal faster. She usually uses aquaphor for the lips and says her products are pretty simple and few. Does not get her nails done.     Patient is otherwise feeling well, without additional skin concerns.      Physical Exam:  SKIN: Focused examination of face was  performed.  - no rash, erythema or swelling today.     - No other lesions of concern on areas examined.     Medications:  Current Outpatient Medications   Medication Sig Dispense Refill    amitriptyline (ELAVIL) 10 MG tablet Take 1 tablet (10 mg) by mouth at bedtime 90 tablet 4    cholecalciferol 25 MCG (1000 UT) TABS       famotidine (PEPCID) 40 MG tablet Take 1 tablet (40 mg) by mouth 2 times daily. 180 tablet 0    FLUoxetine (PROZAC) 10 MG capsule Take 1 capsule (10 mg) by mouth daily. 90 capsule 2    folic acid (FOLVITE) 1 MG tablet TAKE 1 TABLET(1 MG) BY MOUTH DAILY 90 tablet 3    levonorgestrel-ethinyl estradiol (AVIANE) 0.1-20 MG-MCG tablet Take 1 tablet by mouth daily. Please make follow up appt for refills. 84 tablet 0    levothyroxine (SYNTHROID/LEVOTHROID) 88 MCG tablet Take 1 tablet (88 mcg) by mouth daily. 90 tablet 3    linaclotide (LINZESS) 72 MCG capsule Take 1 capsule (72 mcg) by mouth every morning (before breakfast) 90 capsule 3    liothyronine (CYTOMEL) 5 MCG tablet TAKE 1 TABLET(5 MCG) BY MOUTH DAILY 90 tablet 3    MAGNESIUM GLYCINATE PO Take 4 capsules by mouth At Bedtime      naratriptan (AMERGE) 2.5 MG tablet Take 1 tablet (2.5 mg) by mouth at onset of headache for migraine. May repeat in 4 hours. Max 2 tablets/24 hours. 18 tablet 9    rimegepant (NURTEC) 75 MG ODT tablet Place 1 tablet (75 mg) under the tongue every 48 hours 16 tablet 11    topiramate (TOPAMAX) 50 MG tablet Take 1 tablet (50 mg) by mouth 2 times daily 180 tablet 3    tranexamic acid (LYSTEDA) 650 MG tablet Take 2 tablets three times daily for up to 5 days, starting with onset of heavy menstruation. 30 tablet 11    Vonoprazan Fumarate 10 MG TABS Take 1 tablet by mouth daily. 90 tablet 3     Current Facility-Administered Medications   Medication Dose Route Frequency Provider Last Rate Last Admin    Botulinum Toxin Type A (BOTOX) 200 units injection 155 Units  155 Units Intramuscular See Admin Instructions    155 Units at  09/05/24 1415      Past Medical History:   Patient Active Problem List   Diagnosis    PMDD (premenstrual dysphoric disorder)    Menorrhagia with regular cycle    Hypothyroidism due to Hashimoto's thyroiditis    Chronic migraine without aura without status migrainosus, not intractable    Gastroesophageal reflux disease with esophagitis without hemorrhage    Eosinophilic esophagitis    Gastroesophageal reflux disease without esophagitis    Chronic sore throat    Chronic pain of both ears     Past Medical History:   Diagnosis Date    Ear pain 02/25/2019    Hypothyroidism due to Hashimoto's thyroiditis        CC Isabel Finney, APRN CNP  [address]  [place] [id number] on close of this encounter.

## 2024-11-21 NOTE — LETTER
11/21/2024      Dinorah Mai  [address] [place] [address]      Dear Colleague,    Thank you for referring your patient, Dinorah Mai, to the [facility]. Please see a copy of my visit note below.    [facility]ology Note  Encounter Date: Nov 21, 2024  Office Visit     Reviewed patients past medical history and pertinent chart review prior to patients visit today.     Dermatology Problem List:  Recurrent lip dermatitis  -ketoconazole 2% cream and hydrocortisone ointment given for flares 11/21/2024     ____________________________________________    Assessment & Plan:     # Lip dermatitis, etiology unknown.  We discussed eliminating the products they are using on the face and hands.   I have advised patient to use  Hydrocortisone 2.5% ointment 2 times daily and ketoconazole 2% cream once daily when rash is flared until resolved. With flares, only use vaseline or Aquaphor ointment on the face and hands. Once rash has been resolved for 2 to 3 weeks then it is okay to start adding in any products they like to use on their face.  Patient will only add one product every 2 weeks so that if the lips react which may give them a better idea of what is causing the rash.  If patient has not achieved full resolution in 1 month they will return to clinic for reevaluation.     Follow up ALESHIA Finney, LELIA  Dermatology    _______________________________________    CC: Derm Problem (Angular chelitis often on mouth/Nystatin orally and seems to help ( over the last 10 years) about 2 x a year ( had gel too and seemed to help) )    HPI:  Ms. Dinorah Mai is a(n) 44 year old female who presents today as a new patient for lip rash that comes and goes about twice per year for the past 10 years. She has not been able to connect it with any certain products, foods, etc. She has used oral nystatin from [facility] which she does think helps the rash heal faster. She usually  uses aquaphor for the lips and says her products are pretty simple and few. Does not get her nails done.     Patient is otherwise feeling well, without additional skin concerns.      Physical Exam:  SKIN: Focused examination of face was performed.  - no rash, erythema or swelling today.     - No other lesions of concern on areas examined.     Medications:  Current Outpatient Medications   Medication Sig Dispense Refill     amitriptyline (ELAVIL) 10 MG tablet Take 1 tablet (10 mg) by mouth at bedtime 90 tablet 4     cholecalciferol 25 MCG (1000 UT) TABS        famotidine (PEPCID) 40 MG tablet Take 1 tablet (40 mg) by mouth 2 times daily. 180 tablet 0     FLUoxetine (PROZAC) 10 MG capsule Take 1 capsule (10 mg) by mouth daily. 90 capsule 2     folic acid (FOLVITE) 1 MG tablet TAKE 1 TABLET(1 MG) BY MOUTH DAILY 90 tablet 3     levonorgestrel-ethinyl estradiol (AVIANE) 0.1-20 MG-MCG tablet Take 1 tablet by mouth daily. Please make follow up appt for refills. 84 tablet 0     levothyroxine (SYNTHROID/LEVOTHROID) 88 MCG tablet Take 1 tablet (88 mcg) by mouth daily. 90 tablet 3     linaclotide (LINZESS) 72 MCG capsule Take 1 capsule (72 mcg) by mouth every morning (before breakfast) 90 capsule 3     liothyronine (CYTOMEL) 5 MCG tablet TAKE 1 TABLET(5 MCG) BY MOUTH DAILY 90 tablet 3     MAGNESIUM GLYCINATE PO Take 4 capsules by mouth At Bedtime       naratriptan (AMERGE) 2.5 MG tablet Take 1 tablet (2.5 mg) by mouth at onset of headache for migraine. May repeat in 4 hours. Max 2 tablets/24 hours. 18 tablet 9     rimegepant (NURTEC) 75 MG ODT tablet Place 1 tablet (75 mg) under the tongue every 48 hours 16 tablet 11     topiramate (TOPAMAX) 50 MG tablet Take 1 tablet (50 mg) by mouth 2 times daily 180 tablet 3     tranexamic acid (LYSTEDA) 650 MG tablet Take 2 tablets three times daily for up to 5 days, starting with onset of heavy menstruation. 30 tablet 11     Vonoprazan Fumarate 10 MG TABS Take 1 tablet by mouth daily. 90  tablet 3     Current Facility-Administered Medications   Medication Dose Route Frequency Provider Last Rate Last Admin     Botulinum Toxin Type A (BOTOX) 200 units injection 155 Units  155 Units Intramuscular See Admin Instructions    155 Units at 09/05/24 1415      Past Medical History:   Patient Active Problem List   Diagnosis     PMDD (premenstrual dysphoric disorder)     Menorrhagia with regular cycle     Hypothyroidism due to Hashimoto's thyroiditis     Chronic migraine without aura without status migrainosus, not intractable     Gastroesophageal reflux disease with esophagitis without hemorrhage     Eosinophilic esophagitis     Gastroesophageal reflux disease without esophagitis     Chronic sore throat     Chronic pain of both ears     Past Medical History:   Diagnosis Date     Ear pain 02/25/2019     Hypothyroidism due to Hashimoto's thyroiditis        CC HUGH Nagy CNP  [address] [place] [address] on close of this encounter.       Again, thank you for allowing me to participate in the care of your patient.        Sincerely,        HUGH Franco CNP

## 2024-11-27 ASSESSMENT — HEADACHE IMPACT TEST (HIT 6)
HOW OFTEN HAVE YOU FELT FED UP OR IRRITATED BECAUSE OF YOUR HEADACHES: SOMETIMES
HOW OFTEN HAVE YOU FELT TOO TIRED TO WORK BECAUSE OF YOUR HEADACHES: SOMETIMES
HOW OFTEN DO HEADACHES LIMIT YOUR DAILY ACTIVITIES: SOMETIMES
WHEN YOU HAVE A HEADACHE HOW OFTEN DO YOU WISH YOU COULD LIE DOWN: SOMETIMES
HOW OFTEN DID HEADACHS LIMIT CONCENTRATION ON WORK OR DAILY ACTIVITY: SOMETIMES
WHEN YOU HAVE HEADACHES HOW OFTEN IS THE PAIN SEVERE: SOMETIMES
HIT6 TOTAL SCORE: 60

## 2024-12-02 ENCOUNTER — OFFICE VISIT (OUTPATIENT)
Dept: NEUROLOGY | Facility: CLINIC | Age: 44
End: 2024-12-02
Payer: COMMERCIAL

## 2024-12-02 DIAGNOSIS — G43.709 CHRONIC MIGRAINE WITHOUT AURA WITHOUT STATUS MIGRAINOSUS, NOT INTRACTABLE: Primary | ICD-10-CM

## 2024-12-02 NOTE — LETTER
"12/2/2024       RE: Dinorah Mai  [address]  [place] [address]     Dear Colleague,    Thank you for referring your patient, Dinorah Mai, to the [facility] [place] at [facility]. Please see a copy of my visit note below.    PROCEDURE NOTE:     [facility]  Botulinum Toxin Procedure     Headache Neurology    12/02/24    Procedure:  OnabotulinumtoxinA injections for chronic migraine  Indication:  Chronic migraine     Dinorah suffers from severe intractable headaches.  She was referred for onabotulinumtoxinA injections for headache.  Risks, benefits, and alternatives were discussed.  All questions were answered and consent given.  She decided to proceed with the injections.      Headache history, from initial consult note: \"9/14/2022 note\".     Prior to initiation of botulinum toxin injections, Ms. Mai reported 15 headache days per month, with 15 severe headache days per month. Her headaches are quite disabling and often interfere with her ability to function normally.     Date of last injections:     09/05/24     Ms. Mai milder to moderate 12 days and 4 severe in 90 days.   She has noticed a wearing off phenomenon prior to this round of botulinum toxin injections, lasting 2 weeks.     Botulinum toxin injections have improved their functioning: lessen migraines a lot an before at least 15/month and now may be 5/month. Can work, exercise and be with her kids, more traveling     She has attempted other migraine prophylactic treatments in the past, which have included: amitriptyline, topiramate, naratriptan, rizatriptan, sumatriptan     She currently takes amitriptyline, topiramate, naratriptan for headache prevention.     Ms. Lindseys pain was assessed prior to the procedure.  She rated her pain today as 0 out of 10.     Procedural Pause: Procedural pause was conducted to verify correct patient identity, procedure " to be performed, correct side and site, correct patient position, and special requirements. Appropriate hand hygiene was utilized, and each injection site was prepped with alcohol wipe or Chloraprep swab.      Procedure Details: 200 units of onabotulinumtoxinA was diluted in 4 mL 0.9% normal saline. A total of 155 units of onabotulinumtoxinA were injected using 30 gauge 0.5 in needles into the muscles listed below. 45 units of onabotulinumtoxinA were wasted.         EQUIPMENT USED:  Needles-30 gauge, 0.5 inches for injections  Four 1-ml tuberculin syringes for injections  One sodium chloride 10 ml vial preservative free  Alcohol swabs     SKIN PREPARATION:  Skin preparation was performed using an alcohol wipe.        AREA/MUSCLE INJECTED:  155 units of Botox  Right upper Trapezius (upper cervical) - 5 units of Botox at 3 site/s.   Left upper Trapezius (upper cervical) - 5 units of Botox at 3 site/s.      Right cervical paraspinals - 5 units of Botox at 2 site/s.   Left cervical paraspinals - 5 units of Botox at 2 site/s.      Left occipitalis - 5 units of Botox at 3 site/s.  Right occipitalis -5 units of Botox at 3 site/s     Right Frontalis - 5 units of Botox at 2 site/s.  Left Frontalis - 5 units of Botox at 2 site/s.     Right Temporalis - 5 units of Botox at 4 site/s.  Left Temporalis - 5 units of Botox at 4 site/s.     Right  - 5 units of Botox at 1 site/s.              Left  - 5 units of Botox at 1 site/s.     Procerus - 5 units of Botox at 1 site/s.     RESPONSE TO PROCEDURE:  tolerated the procedure well and there were no immediate complications.  Patient was allowed to recover for an appropriate period of time and was discharged home in stable condition.     FOLLOW UP:     Repeat Botox injections in 12 weeks        This procedure was performed under a hospital privileging agreement with Dr. Willie Dyer, APRN, [facility]      Again, thank you for  allowing me to participate in the care of your patient.      Sincerely,    HUGH Wright CNP

## 2024-12-02 NOTE — PROGRESS NOTES
"PROCEDURE NOTE:     [facility]  Botulinum Toxin Procedure     Headache Neurology    12/02/24    Procedure:  OnabotulinumtoxinA injections for chronic migraine  Indication:  Chronic migraine     Dinorah suffers from severe intractable headaches.  She was referred for onabotulinumtoxinA injections for headache.  Risks, benefits, and alternatives were discussed.  All questions were answered and consent given.  She decided to proceed with the injections.      Headache history, from initial consult note: \"9/14/2022 note\".     Prior to initiation of botulinum toxin injections, Ms. Mai reported 15 headache days per month, with 15 severe headache days per month. Her headaches are quite disabling and often interfere with her ability to function normally.     Date of last injections:     09/05/24     Ms. Mai milder to moderate 12 days and 4 severe in 90 days.   She has noticed a wearing off phenomenon prior to this round of botulinum toxin injections, lasting 2 weeks.     Botulinum toxin injections have improved their functioning: lessen migraines a lot an before at least 15/month and now may be 5/month. Can work, exercise and be with her kids, more traveling     She has attempted other migraine prophylactic treatments in the past, which have included: amitriptyline, topiramate, naratriptan, rizatriptan, sumatriptan     She currently takes amitriptyline, topiramate, naratriptan for headache prevention.     Ms. Lindseys pain was assessed prior to the procedure.  She rated her pain today as 0 out of 10.     Procedural Pause: Procedural pause was conducted to verify correct patient identity, procedure to be performed, correct side and site, correct patient position, and special requirements. Appropriate hand hygiene was utilized, and each injection site was prepped with alcohol wipe or Chloraprep swab.      Procedure Details: 200 units of onabotulinumtoxinA was diluted in 4 mL 0.9% normal saline. A total of 155 " units of onabotulinumtoxinA were injected using 30 gauge 0.5 in needles into the muscles listed below. 45 units of onabotulinumtoxinA were wasted.         EQUIPMENT USED:  Needles-30 gauge, 0.5 inches for injections  Four 1-ml tuberculin syringes for injections  One sodium chloride 10 ml vial preservative free  Alcohol swabs     SKIN PREPARATION:  Skin preparation was performed using an alcohol wipe.        AREA/MUSCLE INJECTED:  155 units of Botox  Right upper Trapezius (upper cervical) - 5 units of Botox at 3 site/s.   Left upper Trapezius (upper cervical) - 5 units of Botox at 3 site/s.      Right cervical paraspinals - 5 units of Botox at 2 site/s.   Left cervical paraspinals - 5 units of Botox at 2 site/s.      Left occipitalis - 5 units of Botox at 3 site/s.  Right occipitalis -5 units of Botox at 3 site/s     Right Frontalis - 5 units of Botox at 2 site/s.  Left Frontalis - 5 units of Botox at 2 site/s.     Right Temporalis - 5 units of Botox at 4 site/s.  Left Temporalis - 5 units of Botox at 4 site/s.     Right  - 5 units of Botox at 1 site/s.              Left  - 5 units of Botox at 1 site/s.     Procerus - 5 units of Botox at 1 site/s.     RESPONSE TO PROCEDURE:  tolerated the procedure well and there were no immediate complications.  Patient was allowed to recover for an appropriate period of time and was discharged home in stable condition.     FOLLOW UP:     Repeat Botox injections in 12 weeks        This procedure was performed under a hospital privileging agreement with Dr. Willie Dyer, HUGH, [facility]

## 2024-12-09 ENCOUNTER — ANCILLARY PROCEDURE (OUTPATIENT)
Dept: ULTRASOUND IMAGING | Facility: CLINIC | Age: 44
End: 2024-12-09
Attending: INTERNAL MEDICINE
Payer: COMMERCIAL

## 2024-12-09 DIAGNOSIS — E04.1 SOLITARY THYROID NODULE: ICD-10-CM

## 2024-12-09 PROCEDURE — 76536 US EXAM OF HEAD AND NECK: CPT | Mod: GC | Performed by: RADIOLOGY

## 2024-12-16 DIAGNOSIS — E04.1 SOLITARY THYROID NODULE: Primary | ICD-10-CM

## 2024-12-23 ENCOUNTER — MYC REFILL (OUTPATIENT)
Dept: MIDWIFE SERVICES | Facility: CLINIC | Age: 44
End: 2024-12-23
Payer: COMMERCIAL

## 2024-12-23 DIAGNOSIS — Z30.011 BCP (BIRTH CONTROL PILLS) INITIATION: ICD-10-CM

## 2024-12-24 DIAGNOSIS — Z30.011 BCP (BIRTH CONTROL PILLS) INITIATION: ICD-10-CM

## 2024-12-24 RX ORDER — LEVONORGESTREL/ETHIN.ESTRADIOL 0.1-0.02MG
1 TABLET ORAL DAILY
Qty: 28 TABLET | Refills: 0 | Status: SHIPPED | OUTPATIENT
Start: 2024-12-24

## 2024-12-24 NOTE — TELEPHONE ENCOUNTER
10/22/24 virtual visit for OCP with SENG La  Rx given for levonorgestrel-ethinyl estradiol (AVIANE) 0.1-20 MG-MCG tablet     Patient has follow up appointment in person on 1/15/25. She has been taking only active pills so she is out and needs refill. Outside of RN protocol as she is taking it differently than prescribed so routing to CNM to sign refill if appropriate.     Gwendolyn ENGLISH RN   [facility] Triage RN

## 2024-12-28 RX ORDER — TIMOLOL MALEATE 5 MG/ML
SOLUTION/ DROPS OPHTHALMIC
Qty: 84 TABLET | Refills: 0 | Status: SHIPPED | OUTPATIENT
Start: 2024-12-28

## 2025-01-15 ENCOUNTER — OFFICE VISIT (OUTPATIENT)
Dept: MIDWIFE SERVICES | Facility: CLINIC | Age: 45
End: 2025-01-15
Payer: COMMERCIAL

## 2025-01-15 VITALS
OXYGEN SATURATION: 99 % | DIASTOLIC BLOOD PRESSURE: 78 MMHG | WEIGHT: 126.2 LBS | HEART RATE: 79 BPM | SYSTOLIC BLOOD PRESSURE: 112 MMHG | BODY MASS INDEX: 22.36 KG/M2

## 2025-01-15 DIAGNOSIS — F32.A DEPRESSION, UNSPECIFIED DEPRESSION TYPE: ICD-10-CM

## 2025-01-15 DIAGNOSIS — N92.4 EXCESSIVE BLEEDING IN PREMENOPAUSAL PERIOD: Primary | ICD-10-CM

## 2025-01-15 RX ORDER — ACETAMINOPHEN AND CODEINE PHOSPHATE 120; 12 MG/5ML; MG/5ML
0.35 SOLUTION ORAL DAILY
Qty: 84 TABLET | Refills: 3 | Status: SHIPPED | OUTPATIENT
Start: 2025-01-15

## 2025-01-15 NOTE — PROGRESS NOTES
S:  Dinorah says that she has had light bleeding all of the time with birth control pills prescribed in October due to heavy periods.  She also did not like the week off.  She does not want to continue with them. Her partner has a vasectomy so she does not need birth control.  She would most like to have an ablation but that would cost about $6,000 with her insurance. She has been very happy with her antidepressant prescribed for depression but could like to increase the dose. She thinks a higher does might help her have more energy during the day.  She knows other people who like higher doses.  She report a great support system in her life. She has been to therapy and knows who to contact if she wants to go back. She declines any additional resources.   O:  Appears well, no distress  A/P:  (N92.4) Excessive bleeding in premenopausal period  (primary encounter diagnosis)  Plan: norethindrone (MICRONOR) 0.35 MG tablet      We discussed other hormonal options to help with bleeding.  Mirena IUD is known to be most helpful.  Progesterone only pills might be helpful.  The same dose is taken daily.  She was interested in trying them.     (F32.A) Depression, unspecified depression type  Plan: FLUoxetine (PROZAC) 20 MG capsule        I increased her Prozac per her request.  She declines Mental Health referrals for medication management or therapy at this time. She knows she can change her mind at any time.

## 2025-02-05 DIAGNOSIS — G43.709 CHRONIC MIGRAINE WITHOUT AURA WITHOUT STATUS MIGRAINOSUS, NOT INTRACTABLE: ICD-10-CM

## 2025-02-05 RX ORDER — AMITRIPTYLINE HYDROCHLORIDE 10 MG/1
10 TABLET ORAL AT BEDTIME
Qty: 90 TABLET | Refills: 3 | Status: SHIPPED | OUTPATIENT
Start: 2025-02-05

## 2025-02-06 NOTE — TELEPHONE ENCOUNTER
RX Authorization    Medication: Amitriptyline (ELAVIL) 10 MG tablet    Date last refill ordered: 1/19/2024    Quantity ordered: 90    # refills: 4    Date of last clinic visit with ordering provider: 12/21/2023    Date of next clinic visit with ordering provider:    All pertinent protocol data (lab date/result):    Include pertinent information from patients message:

## 2025-02-19 ASSESSMENT — HEADACHE IMPACT TEST (HIT 6)
WHEN YOU HAVE A HEADACHE HOW OFTEN DO YOU WISH YOU COULD LIE DOWN: VERY OFTEN
WHEN YOU HAVE HEADACHES HOW OFTEN IS THE PAIN SEVERE: VERY OFTEN
HOW OFTEN HAVE YOU FELT TOO TIRED TO WORK BECAUSE OF YOUR HEADACHES: SOMETIMES
HIT6 TOTAL SCORE: 62
HOW OFTEN DID HEADACHS LIMIT CONCENTRATION ON WORK OR DAILY ACTIVITY: SOMETIMES
HOW OFTEN HAVE YOU FELT FED UP OR IRRITATED BECAUSE OF YOUR HEADACHES: SOMETIMES
HOW OFTEN DO HEADACHES LIMIT YOUR DAILY ACTIVITIES: SOMETIMES

## 2025-02-24 ENCOUNTER — OFFICE VISIT (OUTPATIENT)
Dept: NEUROLOGY | Facility: CLINIC | Age: 45
End: 2025-02-24
Payer: COMMERCIAL

## 2025-02-24 DIAGNOSIS — G43.709 CHRONIC MIGRAINE WITHOUT AURA WITHOUT STATUS MIGRAINOSUS, NOT INTRACTABLE: Primary | ICD-10-CM

## 2025-02-24 PROCEDURE — 64615 CHEMODENERV MUSC MIGRAINE: CPT | Performed by: NURSE PRACTITIONER

## 2025-02-24 NOTE — LETTER
"2/24/2025       RE: Dinorah Mai  [address]  [place] [address]     Dear Colleague,    Thank you for referring your patient, Dinorah Mai, to the [facility] [place] at [facility]. Please see a copy of my visit note below.    PROCEDURE NOTE:     [facility]  Botulinum Toxin Procedure     Headache Neurology     02/24/25    Procedure:  OnabotulinumtoxinA injections for chronic migraine  Indication:  Chronic migraine     Dinorah suffers from severe intractable headaches.  She was referred for onabotulinumtoxinA injections for headache.  Risks, benefits, and alternatives were discussed.  All questions were answered and consent given.  She decided to proceed with the injections.      Headache history, from initial consult note: \"9/14/2022 note\".     Prior to initiation of botulinum toxin injections, Ms. Mai reported 15 headache days per month, with 15 severe headache days per month. Her headaches are quite disabling and often interfere with her ability to function normally.     Date of last injections:   12/02/24     Ms. Mai milder milder headaches and only 2 severe in 90 days.   She has noticed a wearing off phenomenon prior to this round of botulinum toxin injections, lasting 2 weeks.     Botulinum toxin injections have improved their functioning: lessen migraines a lot an before at least 15/month and now may be 5/month. Can work, exercise and be with her kids, more traveling     She has attempted other migraine prophylactic treatments in the past, which have included: amitriptyline, topiramate, naratriptan, rizatriptan, sumatriptan     She currently takes amitriptyline, topiramate, naratriptan for headache prevention.     Ms. Lindseys pain was assessed prior to the procedure.  She rated her pain today as 0 out of 10.     Procedural Pause: Procedural pause was conducted to verify correct patient identity, procedure " to be performed, correct side and site, correct patient position, and special requirements. Appropriate hand hygiene was utilized, and each injection site was prepped with alcohol wipe or Chloraprep swab.      Procedure Details: 200 units of onabotulinumtoxinA was diluted in 4 mL 0.9% normal saline. A total of 155 units of onabotulinumtoxinA were injected using 30 gauge 0.5 in needles into the muscles listed below. 45 units of onabotulinumtoxinA were wasted.         EQUIPMENT USED:  Needles-30 gauge, 0.5 inches for injections  Four 1-ml tuberculin syringes for injections  One sodium chloride 10 ml vial preservative free  Alcohol swabs     SKIN PREPARATION:  Skin preparation was performed using an alcohol wipe.        AREA/MUSCLE INJECTED:  155 units of Botox  Right upper Trapezius (upper cervical) - 5 units of Botox at 3 site/s.   Left upper Trapezius (upper cervical) - 5 units of Botox at 3 site/s.      Right cervical paraspinals - 5 units of Botox at 2 site/s.   Left cervical paraspinals - 5 units of Botox at 2 site/s.      Left occipitalis - 5 units of Botox at 3 site/s.  Right occipitalis -5 units of Botox at 3 site/s     Right Frontalis - 5 units of Botox at 2 site/s.  Left Frontalis - 5 units of Botox at 2 site/s.     Right Temporalis - 5 units of Botox at 4 site/s.  Left Temporalis - 5 units of Botox at 4 site/s.     Right  - 5 units of Botox at 1 site/s.              Left  - 5 units of Botox at 1 site/s.     Procerus - 5 units of Botox at 1 site/s.     RESPONSE TO PROCEDURE:  tolerated the procedure well and there were no immediate complications.  Patient was allowed to recover for an appropriate period of time and was discharged home in stable condition.     FOLLOW UP:     Repeat Botox injections in 12 weeks        This procedure was performed under a hospital privileging agreement with Dr. Willie Dyer, APRN, [facility]      Again, thank you for  allowing me to participate in the care of your patient.      Sincerely,    HUGH Wright CNP

## 2025-02-24 NOTE — PROGRESS NOTES
"PROCEDURE NOTE:     [facility]  Botulinum Toxin Procedure     Headache Neurology     02/24/25    Procedure:  OnabotulinumtoxinA injections for chronic migraine  Indication:  Chronic migraine     Dinorah suffers from severe intractable headaches.  She was referred for onabotulinumtoxinA injections for headache.  Risks, benefits, and alternatives were discussed.  All questions were answered and consent given.  She decided to proceed with the injections.      Headache history, from initial consult note: \"9/14/2022 note\".     Prior to initiation of botulinum toxin injections, Ms. Mai reported 15 headache days per month, with 15 severe headache days per month. Her headaches are quite disabling and often interfere with her ability to function normally.     Date of last injections:   12/02/24     Ms. Mai milder milder headaches and only 2 severe in 90 days.   She has noticed a wearing off phenomenon prior to this round of botulinum toxin injections, lasting 2 weeks.     Botulinum toxin injections have improved their functioning: lessen migraines a lot an before at least 15/month and now may be 5/month. Can work, exercise and be with her kids, more traveling     She has attempted other migraine prophylactic treatments in the past, which have included: amitriptyline, topiramate, naratriptan, rizatriptan, sumatriptan     She currently takes amitriptyline, topiramate, naratriptan for headache prevention.     Ms. Lindseys pain was assessed prior to the procedure.  She rated her pain today as 0 out of 10.     Procedural Pause: Procedural pause was conducted to verify correct patient identity, procedure to be performed, correct side and site, correct patient position, and special requirements. Appropriate hand hygiene was utilized, and each injection site was prepped with alcohol wipe or Chloraprep swab.      Procedure Details: 200 units of onabotulinumtoxinA was diluted in 4 mL 0.9% normal saline. A total of 155 " units of onabotulinumtoxinA were injected using 30 gauge 0.5 in needles into the muscles listed below. 45 units of onabotulinumtoxinA were wasted.         EQUIPMENT USED:  Needles-30 gauge, 0.5 inches for injections  Four 1-ml tuberculin syringes for injections  One sodium chloride 10 ml vial preservative free  Alcohol swabs     SKIN PREPARATION:  Skin preparation was performed using an alcohol wipe.        AREA/MUSCLE INJECTED:  155 units of Botox  Right upper Trapezius (upper cervical) - 5 units of Botox at 3 site/s.   Left upper Trapezius (upper cervical) - 5 units of Botox at 3 site/s.      Right cervical paraspinals - 5 units of Botox at 2 site/s.   Left cervical paraspinals - 5 units of Botox at 2 site/s.      Left occipitalis - 5 units of Botox at 3 site/s.  Right occipitalis -5 units of Botox at 3 site/s     Right Frontalis - 5 units of Botox at 2 site/s.  Left Frontalis - 5 units of Botox at 2 site/s.     Right Temporalis - 5 units of Botox at 4 site/s.  Left Temporalis - 5 units of Botox at 4 site/s.     Right  - 5 units of Botox at 1 site/s.              Left  - 5 units of Botox at 1 site/s.     Procerus - 5 units of Botox at 1 site/s.     RESPONSE TO PROCEDURE:  tolerated the procedure well and there were no immediate complications.  Patient was allowed to recover for an appropriate period of time and was discharged home in stable condition.     FOLLOW UP:     Repeat Botox injections in 12 weeks        This procedure was performed under a hospital privileging agreement with Dr. Willie Dyer, HUGH, [facility]

## 2025-04-21 ENCOUNTER — TELEPHONE (OUTPATIENT)
Dept: GASTROENTEROLOGY | Facility: CLINIC | Age: 45
End: 2025-04-21

## 2025-04-21 NOTE — TELEPHONE ENCOUNTER
Patient Contacted and scheduled the following:    Appointment type: Return   Provider:    Return date: 5/22  Specialty phone number: [phone]

## 2025-05-06 ENCOUNTER — E-VISIT (OUTPATIENT)
Dept: URGENT CARE | Facility: CLINIC | Age: 45
End: 2025-05-06
Payer: COMMERCIAL

## 2025-05-06 DIAGNOSIS — H10.32 ACUTE BACTERIAL CONJUNCTIVITIS OF LEFT EYE: Primary | ICD-10-CM

## 2025-05-06 PROCEDURE — 99207 PR NON-BILLABLE SERV PER CHARTING: CPT | Performed by: EMERGENCY MEDICINE

## 2025-05-06 RX ORDER — OFLOXACIN 3 MG/ML
SOLUTION/ DROPS OPHTHALMIC
Qty: 5 ML | Refills: 0 | Status: SHIPPED | OUTPATIENT
Start: 2025-05-06 | End: 2025-05-13

## 2025-05-06 NOTE — PATIENT INSTRUCTIONS
Thank you for choosing us for your care. I have placed an order for a prescription so that you can start treatment. View your full visit summary for details by clicking on the link below. Your pharmacist will able to address any questions you may have about the medication.     If you re not feeling better within 2-3 days, please schedule an appointment.  You can schedule an appointment right here in [facility], or call [phone]  If the visit is for the same symptoms as your eVisit, we ll refund the cost of your eVisit if seen within seven days.    Pinkeye: Care Instructions  Overview     Pinkeye is redness and swelling of the eye surface and the conjunctiva (the lining of the eyelid and the covering of the white part of the eye). Pinkeye is also called conjunctivitis. Pinkeye is often caused by infection with bacteria or a virus. Dry air, allergies, smoke, and chemicals are other common causes.  Pinkeye often gets better on its own in 7 to 10 days. Antibiotics only help if the pinkeye is caused by bacteria. Pinkeye caused by infection spreads easily. If an allergy or chemical is causing pinkeye, it will not go away unless you can avoid whatever is causing it.  Follow-up care is a key part of your treatment and safety. Be sure to make and go to all appointments, and call your doctor if you are having problems. It's also a good idea to know your test results and keep a list of the medicines you take.  How can you care for yourself at home?  Wash your hands often. Always wash them before and after you treat pinkeye or touch your eyes or face.  Use moist cotton or a clean, wet cloth to remove crust. Wipe from the inside corner of the eye to the outside. Use a clean part of the cloth for each wipe.  Put cold or warm wet cloths on your eye a few times a day if the eye hurts.  Do not wear contact lenses or eye makeup until the pinkeye is gone. Throw away any eye makeup you were using when you got pinkeye. Clean your  "contacts and storage case. If you wear disposable contacts, use a new pair when your eye has cleared and it is safe to wear contacts again.  If the doctor gave you antibiotic ointment or eyedrops, use them as directed. Use the medicine for as long as instructed, even if your eye starts looking better soon. Keep the bottle tip clean, and do not let it touch the eye area.  To put in eyedrops or ointment:  Tilt your head back, and pull your lower eyelid down with one finger.  Drop or squirt the medicine inside the lower lid.  Close your eye for 30 to 60 seconds to let the drops or ointment move around.  Do not touch the ointment or dropper tip to your eyelashes or any other surface.  Do not share towels, pillows, or washcloths while you have pinkeye.  When should you call for help?   Call your doctor now or seek immediate medical care if:    You have pain in your eye, not just irritation on the surface.     You have a change in vision or loss of vision.     You have an increase in discharge from the eye.     Your eye has not started to improve or begins to get worse within 48 hours after you start using antibiotics.     Pinkeye lasts longer than 7 days.   Watch closely for changes in your health, and be sure to contact your doctor if you have any problems.  Where can you learn more?  Go to [email or web address]/patiented  Enter Y392 in the search box to learn more about \"Pinkeye: Care Instructions.\"  Current as of: July 31, 2024  Content Version: 14.4    0700-4691 [employer].   Care instructions adapted under license by your healthcare professional. If you have questions about a medical condition or this instruction, always ask your healthcare professional. [employer] disclaims any warranty or liability for your use of this information.    "

## 2025-05-08 DIAGNOSIS — G43.709 CHRONIC MIGRAINE WITHOUT AURA WITHOUT STATUS MIGRAINOSUS, NOT INTRACTABLE: ICD-10-CM

## 2025-05-08 RX ORDER — FOLIC ACID 1 MG/1
1000 TABLET ORAL
Qty: 90 TABLET | Refills: 0 | Status: SHIPPED | OUTPATIENT
Start: 2025-05-08

## 2025-05-14 ASSESSMENT — HEADACHE IMPACT TEST (HIT 6)
HOW OFTEN DID HEADACHS LIMIT CONCENTRATION ON WORK OR DAILY ACTIVITY: SOMETIMES
WHEN YOU HAVE A HEADACHE HOW OFTEN DO YOU WISH YOU COULD LIE DOWN: VERY OFTEN
HOW OFTEN HAVE YOU FELT FED UP OR IRRITATED BECAUSE OF YOUR HEADACHES: SOMETIMES
WHEN YOU HAVE HEADACHES HOW OFTEN IS THE PAIN SEVERE: SOMETIMES
HOW OFTEN HAVE YOU FELT TOO TIRED TO WORK BECAUSE OF YOUR HEADACHES: SOMETIMES
HIT6 TOTAL SCORE: 61
HOW OFTEN DO HEADACHES LIMIT YOUR DAILY ACTIVITIES: SOMETIMES

## 2025-05-19 ENCOUNTER — OFFICE VISIT (OUTPATIENT)
Dept: NEUROLOGY | Facility: CLINIC | Age: 45
End: 2025-05-19
Payer: COMMERCIAL

## 2025-05-19 DIAGNOSIS — G43.709 CHRONIC MIGRAINE WITHOUT AURA WITHOUT STATUS MIGRAINOSUS, NOT INTRACTABLE: Primary | ICD-10-CM

## 2025-05-19 PROCEDURE — 64615 CHEMODENERV MUSC MIGRAINE: CPT | Performed by: NURSE PRACTITIONER

## 2025-05-19 NOTE — PROGRESS NOTES
"PROCEDURE NOTE:     [facility]  Botulinum Toxin Procedure     Headache Neurology     05/19/25     Procedure:  OnabotulinumtoxinA injections for chronic migraine  Indication:  Chronic migraine     Dinorah suffers from severe intractable headaches.  She was referred for onabotulinumtoxinA injections for headache.  Risks, benefits, and alternatives were discussed.  All questions were answered and consent given.  She decided to proceed with the injections.      Headache history, from initial consult note: \"9/14/2022 note\".     Prior to initiation of botulinum toxin injections, Ms. Mai reported 15 headache days per month, with 15 severe headache days per month. Her headaches are quite disabling and often interfere with her ability to function normally.     Date of last injections:   02/24/25    Ms. Mai milder milder headaches and only 1 severe in 90 days.   She has noticed a wearing off phenomenon prior to this round of botulinum toxin injections, lasting 1-2 weeks.     Botulinum toxin injections have improved their functioning: lessen migraines a lot an before at least 15/month and now may be 5/month. Can work, exercise and be with her kids, more traveling     She has attempted other migraine prophylactic treatments in the past, which have included: amitriptyline, topiramate, naratriptan, rizatriptan, sumatriptan     She currently takes amitriptyline, topiramate, naratriptan for headache prevention.     Ms. Lindseys pain was assessed prior to the procedure.  She rated her pain today as 1 out of 10.     Procedural Pause: Procedural pause was conducted to verify correct patient identity, procedure to be performed, correct side and site, correct patient position, and special requirements. Appropriate hand hygiene was utilized, and each injection site was prepped with alcohol wipe or Chloraprep swab.      Procedure Details: 200 units of onabotulinumtoxinA was diluted in 4 mL 0.9% normal saline. A total of " 155 units of onabotulinumtoxinA were injected using 30 gauge 0.5 in needles into the muscles listed below. 45 units of onabotulinumtoxinA were wasted.         EQUIPMENT USED:  Needles-30 gauge, 0.5 inches for injections  Four 1-ml tuberculin syringes for injections  One sodium chloride 10 ml vial preservative free  Alcohol swabs     SKIN PREPARATION:  Skin preparation was performed using an alcohol wipe.        AREA/MUSCLE INJECTED:  155 units of Botox  Right upper Trapezius (upper cervical) - 5 units of Botox at 3 site/s.   Left upper Trapezius (upper cervical) - 5 units of Botox at 3 site/s.      Right cervical paraspinals - 5 units of Botox at 2 site/s.   Left cervical paraspinals - 5 units of Botox at 2 site/s.      Left occipitalis - 5 units of Botox at 3 site/s.  Right occipitalis -5 units of Botox at 3 site/s     Right Frontalis - 5 units of Botox at 2 site/s.  Left Frontalis - 5 units of Botox at 2 site/s.     Right Temporalis - 5 units of Botox at 4 site/s.  Left Temporalis - 5 units of Botox at 4 site/s.     Right  - 5 units of Botox at 1 site/s.              Left  - 5 units of Botox at 1 site/s.     Procerus - 5 units of Botox at 1 site/s.     RESPONSE TO PROCEDURE:  tolerated the procedure well and there were no immediate complications.  Patient was allowed to recover for an appropriate period of time and was discharged home in stable condition.     FOLLOW UP:     Repeat Botox injections in 12 weeks        This procedure was performed under a hospital privileging agreement with Dr. Willie Dyer, APRN, [facility]

## 2025-05-19 NOTE — LETTER
"5/19/2025       RE: Dinorah Mai  [address]  [place] [address]     Dear Colleague,    Thank you for referring your patient, Dinorah Mai, to the [facility] [place] at [facility]. Please see a copy of my visit note below.    PROCEDURE NOTE:     [facility]  Botulinum Toxin Procedure     Headache Neurology     05/19/25     Procedure:  OnabotulinumtoxinA injections for chronic migraine  Indication:  Chronic migraine     Dinorah suffers from severe intractable headaches.  She was referred for onabotulinumtoxinA injections for headache.  Risks, benefits, and alternatives were discussed.  All questions were answered and consent given.  She decided to proceed with the injections.      Headache history, from initial consult note: \"9/14/2022 note\".     Prior to initiation of botulinum toxin injections, Ms. Mai reported 15 headache days per month, with 15 severe headache days per month. Her headaches are quite disabling and often interfere with her ability to function normally.     Date of last injections:   02/24/25    Ms. Mai milder milder headaches and only 1 severe in 90 days.   She has noticed a wearing off phenomenon prior to this round of botulinum toxin injections, lasting 1-2 weeks.     Botulinum toxin injections have improved their functioning: lessen migraines a lot an before at least 15/month and now may be 5/month. Can work, exercise and be with her kids, more traveling     She has attempted other migraine prophylactic treatments in the past, which have included: amitriptyline, topiramate, naratriptan, rizatriptan, sumatriptan     She currently takes amitriptyline, topiramate, naratriptan for headache prevention.     Ms. Lindseys pain was assessed prior to the procedure.  She rated her pain today as 1 out of 10.     Procedural Pause: Procedural pause was conducted to verify correct patient identity, " procedure to be performed, correct side and site, correct patient position, and special requirements. Appropriate hand hygiene was utilized, and each injection site was prepped with alcohol wipe or Chloraprep swab.      Procedure Details: 200 units of onabotulinumtoxinA was diluted in 4 mL 0.9% normal saline. A total of 155 units of onabotulinumtoxinA were injected using 30 gauge 0.5 in needles into the muscles listed below. 45 units of onabotulinumtoxinA were wasted.         EQUIPMENT USED:  Needles-30 gauge, 0.5 inches for injections  Four 1-ml tuberculin syringes for injections  One sodium chloride 10 ml vial preservative free  Alcohol swabs     SKIN PREPARATION:  Skin preparation was performed using an alcohol wipe.        AREA/MUSCLE INJECTED:  155 units of Botox  Right upper Trapezius (upper cervical) - 5 units of Botox at 3 site/s.   Left upper Trapezius (upper cervical) - 5 units of Botox at 3 site/s.      Right cervical paraspinals - 5 units of Botox at 2 site/s.   Left cervical paraspinals - 5 units of Botox at 2 site/s.      Left occipitalis - 5 units of Botox at 3 site/s.  Right occipitalis -5 units of Botox at 3 site/s     Right Frontalis - 5 units of Botox at 2 site/s.  Left Frontalis - 5 units of Botox at 2 site/s.     Right Temporalis - 5 units of Botox at 4 site/s.  Left Temporalis - 5 units of Botox at 4 site/s.     Right  - 5 units of Botox at 1 site/s.              Left  - 5 units of Botox at 1 site/s.     Procerus - 5 units of Botox at 1 site/s.     RESPONSE TO PROCEDURE:  tolerated the procedure well and there were no immediate complications.  Patient was allowed to recover for an appropriate period of time and was discharged home in stable condition.     FOLLOW UP:     Repeat Botox injections in 12 weeks        This procedure was performed under a hospital privileging agreement with Dr. Willie Dyer, APRN, [facility]    Again, thank  you for allowing me to participate in the care of your patient.      Sincerely,    HUGH Wright CNP

## 2025-05-22 ENCOUNTER — VIRTUAL VISIT (OUTPATIENT)
Dept: GASTROENTEROLOGY | Facility: CLINIC | Age: 45
End: 2025-05-22
Attending: INTERNAL MEDICINE
Payer: COMMERCIAL

## 2025-05-22 DIAGNOSIS — K21.9 GASTROESOPHAGEAL REFLUX DISEASE WITHOUT ESOPHAGITIS: ICD-10-CM

## 2025-05-22 NOTE — PATIENT INSTRUCTIONS
It was a pleasure taking care of you today.  I've included a brief summary of our discussion and care plan from today's visit below.  Please review this information with your primary care provider.        Please trial esomeprazole (nexium) 20mg daily and add an additional PM dose if needed for 2 weeks.   Please send a message after 2 weeks with an update. If it works and is without side effect then a prescription will be sent. If it does not work we will trial pantoprazole (protonix) for 2 weeks. The drug to follow that will be rabeprazole and following that will be vonoprazan.   The insurance company is requiring that we try all PPIs prior to them approving vonoprazan as an option.       If you feel you received exceptional care and are interested in supporting the clinical and research goals of Dr. Peterson or the Division of Gastroenterology, Hepatology, and Nutrition please contact him directly through [employer]  to discuss opportunities to donate.    Please call my nurse Zuleyka ([phone]) or Sobia ([phone]) with any questions or concerns.     Sincerely,    Levar Peterson, DO  Associate Professor of Medicine  Director, Esophageal Disorders Program  Director of Endoscopy, [facility]  Division of Gastroenterology, Hepatology, and Nutrition  [facility]      Please see below for any additional questions and scheduling guidelines.    Sign up for [employer]: [employer] patient portal serves as a secure platform for accessing your medical records from the [facility]. Additionally, [employer] facilitates easy, timely, and secure messaging with your care team. If you have not signed up, you may do so by using the provided code or calling [phone].    Coordinating your care after your visit:  There are multiple options for scheduling your follow-up care based on your provider's recommendation.    How do I schedule a follow-up clinic appointment:   After your appointment,  you may receive scheduling assistance with the Clinic Coordinators by having a seat in the waiting room and a Clinic Coordinator will call you up to schedule.  Virtual visits or after you leave the clinic:  Your provider has placed a follow-up order in the [employer] portal for scheduling your return appointment. A member of the scheduling team will contact you to schedule.  [employer] Scheduling: Timely scheduling through [employer] is advised to ensure appointment availability.   Call to schedule: You may schedule your follow-up appointment(s) by calling [phone], option 1.    How do I schedule my endoscopy or colonoscopy procedure:  If a procedure, such as a colonoscopy or upper endoscopy was ordered by your provider, the scheduling team will contact you to schedule this procedure. Or you may choose to call to schedule at   [phone], option 2.  Please allow 20-30 minutes when scheduling a procedure.    How do I get my blood work done? To get your blood work done, you need to schedule a lab appointment at an [facility]. There are multiple ways to schedule:   At the clinic: The Clinic Coordinator you meet after your visit can help you schedule a lab appointment.   [employer] scheduling: [employer] offers online lab scheduling at all [facility] laboratory locations.   Call to schedule: You can call [phone] to schedule your lab appointment.    How do I schedule my imaging study: To schedule imaging studies, such as CT scans, ultrasounds, MRIs, or X-rays, contact Imaging Services at [phone].    How do I schedule a referral to another doctor: If your provider recommended a referral to another specialist(s), the referral order was placed by your provider. You will receive a phone call to schedule this referral, or you may choose to call the number attached to the referral to self-schedule.    For Post-Visit Question(s):  For any inquiries following today's visit:  Please utilize [employer]  messaging and allow 48 hours for reply or contact the Call Center during normal business hours at [phone], option 3.  For Emergent After-hours questions, contact the On-Call GI Fellow through the [facility]  at [phone].  In addition, you may contact your Nurse directly using the provided contact information.    Test Results:  Test results will be accessible via [employer] in compliance with the 21st Century Cures Act. This means that your results will be available to you at the same time as your provider. Often you may see your results before your provider does. Results are reviewed by staff within two weeks with communication follow-up. Results may be released in the patient portal prior to your care team review.    Prescription Refill(s):  Medication prescribed by your provider will be addressed during your visit. For future refills, please coordinate with your pharmacy. If you have not had a recent clinic visit or routine labs, for your safety, your provider may not be able to refill your prescription.

## 2025-05-22 NOTE — PROGRESS NOTES
Virtual Visit Details    Type of service:  Video Visit     Originating Location (pt. Location): Home    Distant Location (provider location):  On-site  Platform used for Video Visit: [facility]    Gastroenterology Visit for: Dinorah Mai 1980   MRN: [id number]     Reason for Visit:  chief complaint    Referred by: Pepito  / Keyshawn SHALOM [facility] [address] / [place] [address]  Patient Care Team:  Juan Manuel Park DO as PCP - General (Family Medicine)  Lucas Kennedy MD as Referring Physician (Internal Medicine)  Ally Dyer APRN CNP as Nurse Practitioner (Neurology)  Haylie Her MD as Assigned Endocrinology Provider  Ally Dyer APRN CNP as Assigned Neuroscience Provider  Levar Peterson DO as Physician (Gastroenterology)  Haylie Her MD as MD (Endocrinology, Diabetes, and Metabolism)  Levar Peterson DO as Assigned Gastroenterology Provider  Bessie Tong CNM as Assigned OBGYN Provider  Juan Manuel Park DO as Assigned PCP  Isabel Finney APRN CNP as Assigned Dermatology Provider    History of Present Illness:   Dinorah Mai is a 45 year old female with hypothyroid, constipation, migraine, depression who is presenting as a follow up patient to me previously underwent endoscopy within our division in consultation at the request of Dr. Beyer with a chief complaint of reflux.    5/22/25  Vonoprazan did not get approved. Continues to have symptoms consistent with reflux. Currently on omeprazole 20mg in the morning and if having something that may worsen reflux she will take an additional 20mg at night. She tries not to take additional PPI because of associated anxiety and bloating.     See updated BEDQ and Eckardt score below: These patient reported outcomes are pertinent to the HPI and have personally been reviewed.    ---------------------------------------------------------------  10/15/24  Dinorah Mai states she has been wanting to  talk about acid reflux and if there is anything more she can do to help. She states it has been more than a year since endoscopy and has done everything she feels she can do on a normal diet and lifestyle basis. She feels it is a major issue in her family. Feels her reflux is hereditary. Grandfather  of throat cancer which they believe was related to reflux. Father is a physician and also has bad reflux.     She feels frustrated with having cut out a lot of her diet and with significant lifestyle changes. She has tried OTC meds and doesn't feel they have worked. She has tried prilosec and had bad anxiety and frequent urination with the med along with bloating and constipation. Lansoprazole OTC worked better for ear aches and had doubled the dose however she lost benefit with the medication. She ended up having a yeast infection with it as well as increased hemorrhoids. Has tried pepcid with no relief.     Her symptoms are sore throat overnight and ear aches that keep her up. If she didn't have a restricted diet she would have heartburn. Denies current regurgitation. She finishes eating by 6pm and goes to bed at around 10pm and has her head of bed elevated.     The patient feels that her symptoms are related to reflux based on family history. Symptoms are still bothersome despite dietary and lifestyle measures. Eats low fat low acid diet.     Ear aches for 10 years.     See updated BEDQ and Eckardt score below: These patient reported outcomes are pertinent to the HPI and have personally been reviewed.    ---------------------------------------------------------------     Wt Readings from Last 5 Encounters:   01/15/25 57.2 kg (126 lb 3.2 oz)   24 54.6 kg (120 lb 6.4 oz)   24 54.9 kg (121 lb)   06/10/24 56.3 kg (124 lb 3.2 oz)   24 54.4 kg (120 lb)        Esophageal Questionnaire(s)    BEDQ Questionnaire      10/14/2024     8:49 AM 2025    10:41 AM 2025     9:42 AM   BEDQ Questionnaire:  How Often Have You Had the Following?   Trouble eating solid food (meat, bread, vegetables) 0 0 0   Trouble eating soft foods (yogurt, jello, pudding) 0 0 0   Trouble swallowing liquids 0 0 0   Pain while swallowing 0 0 0   Coughing or choking while swallowing foods or liquids 0 0 0   Total Score: 0 0  0        Patient-reported         10/14/2024     8:49 AM 4/16/2025    10:41 AM 5/17/2025     9:42 AM   BEDQ Questionnaire: Discomfort/Pain Ratings   Eating solid food (meat, bread, vegetables) 0 0 0   Eating soft foods (yogurt, jello, pudding) 0 0 0   Drinking liquid 0 0 0   Total Score: 0 0  0        Patient-reported       Eckardt Questionnaire      10/14/2024     8:50 AM 4/16/2025    10:42 AM 5/17/2025     9:42 AM   Eckardt Questionnaire   Dysphagia 0 0 0   Regurgitation 0 1 1   Retrosternal Pain 0 0 0   Weight Loss (kg) 0 0 0   Total Score:  0 1  1        Patient-reported       Promis 10 Questionnaire      10/14/2024     8:51 AM 4/16/2025    10:43 AM 5/17/2025     9:43 AM   PROMIS 10 FLOWSHEET DATA   In general, would you say your health is: 2 3 3   In general, would you say your quality of life is: 2 3 3   In general, how would you rate your physical health? 2 2 2   In general, how would you rate your mental health, including your mood and your ability to think? 2 3 3   In general, how would you rate your satisfaction with your social activities and relationships? 4 4 4   In general, please rate how well you carry out your usual social activities and roles. (This includes activities at home, at work and in your community, and responsibilities as a parent, child, spouse, employee, friend, etc.) 3 4 4   To what extent are you able to carry out your everyday physical activities such as walking, climbing stairs, carrying groceries, or moving a chair? 4 5 5   In the past 7 days, how often have you been bothered by emotional problems such as feeling anxious, depressed, or irritable? 2 2 2   In the past 7 days, how would  you rate your fatigue on average? 3 2 2   In the past 7 days, how would you rate your pain on average, where 0 means no pain, and 10 means worst imaginable pain? 7 5 5   Mental health question re-calculation - no clinical value 4 4  4    Physical health question re-calculation - no clinical value 3 4  4    Pain question re-calculation - no clinical value 2 3  3    Global Mental Health Score 12 14  14    Global Physical Health Score 11 14  14    PROMIS TOTAL - SUBSCORES 23 28  28        Patient-reported           STUDIES & PROCEDURES:    EGD:   Date: 10/18/23  Impression:   Mucosal changes including intermittent subtle feline appearance and        longitudinal furrows were found in the middle third of the esophagus and        in the lower third of the esophagus. Biopsies were taken with a cold        forceps for histology in 4 quadrants 2 cm and 10 cm proximal to the GE        junction.        The gastroesophageal flap valve was visualized endoscopically and        classified as Hill Grade III (minimal fold, loose to endoscope, hiatal        hernia likely).        The stomach was normal.        The examined duodenum was normal.                                                                                    Impression:            - Esophageal mucosal changes equivocal for                          eosinophilic esophagitis. Biopsied.                          - No endoscopically identifiable reflux esophagitis.                          - Gastroesophageal flap valve classified as Hill Grade                          III (minimal fold, loose to endoscope, hiatal hernia                          likely).                          - Normal stomach.                          - Normal examined duodenum.   Pathology Report:  A.  DISTAL ESOPHAGUS, BIOPSY:  -Mildly active esophagitis with peak eosinophil count of 17/HPF (patchy distribution)  -Negative for intestinal metaplasia and dysplasia     B.  MID ESOPHAGUS, BIOPSY:  -Mildly  active esophagitis with peak eosinophil count of 6/HPF (patchy distribution)  -Negative for intestinal metaplasia and dysplasia    Colonoscopy:  Date:  Impression:  Pathology Report:     EndoFLIP directed at the UES or LES (8cm (EF-325) balloon length or 16cm (EF-322) balloon length):   Date:  8cm balloon  Balloon inflation Balloon pressure CSA (mm^2) DI (mm^2/mmHg) Dmin (mm) Compliance   20 (ladmark ID)        30        40        50           16cm balloon  Balloon inflation Balloon pressure CSA (mm^2) DI (mm^2/mmHg) Dmin (mm) Compliance   30 (ladmark ID)        40        50        60        70           High Resolution Manometry:  Date:  Impression:    PH/Impedance:  Date:  Impression:     Bravo:  48 or 96hr  Date:  Impression:    CT:  Date:  Impression:    Esophagram:  Date:  Impression:    Modified Barium Esophagram:  Date:  Impression:     Prior medical records were reviewed including, but not limited to, notes from referring providers, lab work, radiographic tests, and other diagnostic tests. Pertinent results were summarized above.     History     Past Medical History:   Diagnosis Date    Ear pain 02/25/2019    Hypothyroidism due to Hashimoto's thyroiditis        Past Surgical History:   Procedure Laterality Date    ESOPHAGOSCOPY, GASTROSCOPY, DUODENOSCOPY (EGD), COMBINED N/A 10/18/2023    Procedure: ESOPHAGOGASTRODUODENOSCOPY, WITH BIOPSY;  Surgeon: Kostas Bradford MD;  Location:  GI       Social History     Socioeconomic History    Marital status:      Spouse name: Not on file    Number of children: Not on file    Years of education: Not on file    Highest education level: Not on file   Occupational History    Not on file   Tobacco Use    Smoking status: Never    Smokeless tobacco: Never   Vaping Use    Vaping status: Never Used   Substance and Sexual Activity    Alcohol use: Yes     Comment: 2 drinks a week     Drug use: Never    Sexual activity: Yes     Partners: Male     Birth  control/protection: None   Other Topics Concern    Parent/sibling w/ CABG, MI or angioplasty before 65F 55M? No   Social History Narrative    Not on file     Social Drivers of Health     Financial Resource Strain: Low Risk  (8/8/2024)    Financial Resource Strain     Within the past 12 months, have you or your family members you live with been unable to get utilities (heat, electricity) when it was really needed?: No   Food Insecurity: Low Risk  (8/8/2024)    Food Insecurity     Within the past 12 months, did you worry that your food would run out before you got money to buy more?: No     Within the past 12 months, did the food you bought just not last and you didn t have money to get more?: No   Transportation Needs: Low Risk  (8/8/2024)    Transportation Needs     Within the past 12 months, has lack of transportation kept you from medical appointments, getting your medicines, non-medical meetings or appointments, work, or from getting things that you need?: No   Physical Activity: Unknown (8/8/2024)    Exercise Vital Sign     Days of Exercise per Week: 6 days     Minutes of Exercise per Session: Not on file   Stress: Stress Concern Present (8/8/2024)    [employer] - Occupational Stress Questionnaire     Feeling of Stress : To some extent   Social Connections: Unknown (8/8/2024)    Social Connection and Isolation Panel [NHANES]     Frequency of Communication with Friends and Family: Not on file     Frequency of Social Gatherings with Friends and Family: More than three times a week     Attends Mu-ism Services: Not on file     Active Member of Clubs or Organizations: Not on file     Attends Club or Organization Meetings: Not on file     Marital Status: Not on file   Interpersonal Safety: Low Risk  (5/21/2024)    Interpersonal Safety     Do you feel physically and emotionally safe where you currently live?: Yes     Within the past 12 months, have you been hit, slapped, kicked or  otherwise physically hurt by someone?: No     Within the past 12 months, have you been humiliated or emotionally abused in other ways by your partner or ex-partner?: No   Housing Stability: Low Risk  (8/8/2024)    Housing Stability     Do you have housing? : Yes     Are you worried about losing your housing?: No       Family History   Problem Relation Age of Onset    ALS Mother     Skin Cancer Father     Breast Cancer Maternal Grandmother      Family history reviewed and edited as appropriate    Medications and Allergies:     Outpatient Encounter Medications as of 5/22/2025   Medication Sig Dispense Refill    amitriptyline (ELAVIL) 10 MG tablet Take 1 tablet (10 mg) by mouth at bedtime. 90 tablet 3    cholecalciferol 25 MCG (1000 UT) TABS       FLUoxetine (PROZAC) 20 MG capsule Take 1 capsule (20 mg) by mouth daily. 90 capsule 3    folic acid (FOLVITE) 1 MG tablet TAKE 1 TABLET(1 MG) BY MOUTH DAILY 90 tablet 0    hydrocortisone 2.5 % ointment Apply topically 2 times daily. To all areas of itch/rash when flared. Stop when rash/itch resolves. 20 g 1    ketoconazole (NIZORAL) 2 % external cream Apply to rash daily until rash resolves when flared 15 g 1    levothyroxine (SYNTHROID/LEVOTHROID) 88 MCG tablet Take 1 tablet (88 mcg) by mouth daily. 90 tablet 3    linaclotide (LINZESS) 72 MCG capsule Take 1 capsule (72 mcg) by mouth every morning (before breakfast) 90 capsule 3    liothyronine (CYTOMEL) 5 MCG tablet TAKE 1 TABLET(5 MCG) BY MOUTH DAILY 90 tablet 3    MAGNESIUM GLYCINATE PO Take 4 capsules by mouth At Bedtime      naratriptan (AMERGE) 2.5 MG tablet Take 1 tablet (2.5 mg) by mouth at onset of headache for migraine. May repeat in 4 hours. Max 2 tablets/24 hours. 18 tablet 9    norethindrone (MICRONOR) 0.35 MG tablet Take 1 tablet (0.35 mg) by mouth daily. 84 tablet 3    rimegepant (NURTEC) 75 MG ODT tablet Place 1 tablet (75 mg) under the tongue every 48 hours 16 tablet 11    topiramate (TOPAMAX) 50 MG tablet  Take 1 tablet (50 mg) by mouth 2 times daily 180 tablet 3    Vonoprazan Fumarate 10 MG TABS Take 1 tablet by mouth daily. 90 tablet 3     Facility-Administered Encounter Medications as of 5/22/2025   Medication Dose Route Frequency Provider Last Rate Last Admin    Botulinum Toxin Type A (BOTOX) 200 units injection 155 Units  155 Units Intramuscular See Admin Instructions    155 Units at 05/19/25 0939        No Known Allergies     Review of systems:  A full 10 point review of systems was obtained and was negative except for the pertinent positives and negatives stated within the HPI.    Objective Findings:   Physical Exam:    Constitutional: There were no vitals taken for this visit.  General: Alert, cooperative, no distress, well-appearing  Head: Atraumatic, normocephalic, no obvious abnormalities   Eyes: EOMI, Sclera anicteric, no obvious conjunctival hemorrhage   Nose: Nares normal, no obvious malformation, no obvious rhinorrhea   Respiratory: normal appearing, no cough  Musculoskeletal: Range of motion intact, no obvious strength deficit  Skin: No jaundice, no obvious rash  Neurologic: AAOx3, no obvious neurologic abnormality  Psychiatric: Normal Affect, appropriate mood  Extremities: No obvious edema, no obvious malformation     Labs, Radiology, Pathology     Lab Results   Component Value Date    WBC 6.8 04/07/2021    HGB 14.0 04/07/2021     04/07/2021    ALT 25 04/07/2021    AST 24 04/07/2021     04/07/2021    BUN 11 04/07/2021    CO2 26 04/07/2021    TSH 1.40 08/02/2024    TSH 2.45 09/06/2023    TSH 2.63 02/07/2023    INR 1.01 04/07/2021        Liver Function Studies -   Recent Labs   Lab Test 04/07/21  1143   PROTTOTAL 7.5   ALBUMIN 4.4   BILITOTAL 0.4   ALKPHOS 47   AST 24   ALT 25          Patient Active Problem List    Diagnosis Date Noted    Gastroesophageal reflux disease without esophagitis 10/15/2024     Priority: Medium    Chronic sore throat 10/15/2024     Priority: Medium    Chronic  pain of both ears 10/15/2024     Priority: Medium    Gastroesophageal reflux disease with esophagitis without hemorrhage 08/12/2024     Priority: Medium    Eosinophilic esophagitis 08/12/2024     Priority: Medium    Chronic migraine without aura without status migrainosus, not intractable 03/29/2022     Priority: Medium    PMDD (premenstrual dysphoric disorder) 04/07/2021     Priority: Medium    Menorrhagia with regular cycle 04/07/2021     Priority: Medium    Hypothyroidism due to Hashimoto's thyroiditis 04/07/2021     Priority: Medium      Assessment and Plan   Assessment:    Dinorah Mai is a 45 year old female with hypothyroid, constipation, migraine, depression who is presenting as a follow up patient to me previously underwent endoscopy within our division in consultation at the request of Dr. Beyer with a chief complaint of reflux.    The patient continues to have symptoms of reflux on omeprazole daily with an additional dose in the evening if she knows she will consume something that makes her reflux worse. She has noted side effects with PPI being anxiety and bloating. Her insurance company has not approved vonoprazan 10mg for the non erosive esophagitis indication and will not do so until she has tried every PPI on formulary. She has tried omeprazole and lansoprazole.     We will try esomeprazole OTC followed by pantoprazole prescription followed by rabeprazole prescription and then if needed we will try to obtain vonoprazan again.       1. Gastroesophageal reflux disease without esophagitis       No orders of the defined types were placed in this encounter.       Plan:  Please trial esomeprazole (nexium) 20mg daily and add an additional PM dose if needed for 2 weeks.   Please send a message after 2 weeks with an update. If it works and is without side effect then a prescription will be sent. If it does not work we will trial pantoprazole (protonix) for 2 weeks. The drug to follow that will be  rabeprazole and following that will be vonoprazan.   The insurance company is requiring that we try all PPIs prior to them approving vonoprazan as an option.     Follow up plan:   Return to clinic 6 months and as needed.    The risks and benefits of my recommendations, as well as other treatment options were discussed with the patient and any available family today. All questions were answered.     Follow up: As planned above. 20 minutes was spent on the date of the encounter doing (chart review/review of outside records/review of test results/interpretation of tests/patient visit/documentation/discussion with other provider(s)/discussion with family/and coordinating care).      The patient verbalized understanding of the plan and was appreciative for the time spent and information provided during the office visit.     Author:   Levar Peterson DO  Associate Professor of Medicine  Director, Esophageal Disorders Program  Director of Endoscopy  Division of Gastroenterology, Hepatology, and Nutrition  [facility]      Dr. Peterson speaks for [employer] regarding dupilumab and has participated in advisory boards for the medication. He receives income for these activities. When discussing the medication, patients were informed of Dr. Peterson's role and potential conflict of interest. All questions and/or concerns were answered during the encounter.    Dr. Peterson speaks for [email or web address] regarding vonoprazan. He receives income for these activities. When discussing the medication, patients were informed of Dr. Peterson's role and potential conflict of interest. All questions and/or concerns were answered during the encounter.     Documentation assisted by voice recognition and documentation system.

## 2025-05-22 NOTE — NURSING NOTE
Current patient location: [address]  [place] [address]    Is the patient currently in the state of [place]? YES    Visit mode: VIDEO    If the visit is dropped, the patient can be reconnected by:VIDEO VISIT: Send to e-mail at: [email or web address]    Will anyone else be joining the visit? NO  (If patient encounters technical issues they should call [phone]    Are changes needed to the allergy or medication list? No    Are refills needed on medications prescribed by this physician? NO    Rooming Documentation:  Questionnaire(s) completed    Reason for visit: RECHECK    Louie CARO

## 2025-05-22 NOTE — LETTER
5/22/2025      Dinorah Mai  [address]  [place] [address]      Dear Colleague,    Thank you for referring your patient, Dinorah Mai, to the [facility] [place]. Please see a copy of my visit note below.    Virtual Visit Details    Type of service:  Video Visit     Originating Location (pt. Location): Home    Distant Location (provider location):  On-site  Platform used for Video Visit: [facility]    Gastroenterology Visit for: Dinorah Mai 1980   MRN: [id number]     Reason for Visit:  chief complaint    Referred by: [place]  / [facility] [address] / [place] [address]  Patient Care Team:  Juan Manuel Park DO as PCP - General (Family Medicine)  Lucas Kennedy MD as Referring Physician (Internal Medicine)  Ally Dyer APRN CNP as Nurse Practitioner (Neurology)  Haylie Her MD as Assigned Endocrinology Provider  Ally Dyer APRN CNP as Assigned Neuroscience Provider  Levar Peterson DO as Physician (Gastroenterology)  Haylie Her MD as MD (Endocrinology, Diabetes, and Metabolism)  Levar Peterson DO as Assigned Gastroenterology Provider  Bessie Tong CNM as Assigned OBGYN Provider  Juan Manuel Prak DO as Assigned PCP  Isabel Finney APRN CNP as Assigned Dermatology Provider    History of Present Illness:   Dinorah Mai is a 45 year old female with hypothyroid, constipation, migraine, depression who is presenting as a follow up patient to me previously underwent endoscopy within our division in consultation at the request of Dr. Beyer with a chief complaint of reflux.    5/22/25  Vonoprazan did not get approved. Continues to have symptoms consistent with reflux. Currently on omeprazole 20mg in the morning and if having something that may worsen reflux she will take an additional 20mg at night. She tries not to take additional PPI because of associated anxiety and bloating.     See  updated BEDQ and Eckardt score below: These patient reported outcomes are pertinent to the HPI and have personally been reviewed.    ---------------------------------------------------------------  10/15/24  Dinorah LOZANO Jasonhafsa states she has been wanting to talk about acid reflux and if there is anything more she can do to help. She states it has been more than a year since endoscopy and has done everything she feels she can do on a normal diet and lifestyle basis. She feels it is a major issue in her family. Feels her reflux is hereditary. Grandfather  of throat cancer which they believe was related to reflux. Father is a physician and also has bad reflux.     She feels frustrated with having cut out a lot of her diet and with significant lifestyle changes. She has tried OTC meds and doesn't feel they have worked. She has tried prilosec and had bad anxiety and frequent urination with the med along with bloating and constipation. Lansoprazole OTC worked better for ear aches and had doubled the dose however she lost benefit with the medication. She ended up having a yeast infection with it as well as increased hemorrhoids. Has tried pepcid with no relief.     Her symptoms are sore throat overnight and ear aches that keep her up. If she didn't have a restricted diet she would have heartburn. Denies current regurgitation. She finishes eating by 6pm and goes to bed at around 10pm and has her head of bed elevated.     The patient feels that her symptoms are related to reflux based on family history. Symptoms are still bothersome despite dietary and lifestyle measures. Eats low fat low acid diet.     Ear aches for 10 years.     See updated BEDQ and Eckardt score below: These patient reported outcomes are pertinent to the HPI and have personally been reviewed.    ---------------------------------------------------------------     Wt Readings from Last 5 Encounters:   01/15/25 57.2 kg (126 lb 3.2 oz)   24 54.6 kg  (120 lb 6.4 oz)   07/16/24 54.9 kg (121 lb)   06/10/24 56.3 kg (124 lb 3.2 oz)   05/21/24 54.4 kg (120 lb)        Esophageal Questionnaire(s)    BEDQ Questionnaire      10/14/2024     8:49 AM 4/16/2025    10:41 AM 5/17/2025     9:42 AM   BEDQ Questionnaire: How Often Have You Had the Following?   Trouble eating solid food (meat, bread, vegetables) 0 0 0   Trouble eating soft foods (yogurt, jello, pudding) 0 0 0   Trouble swallowing liquids 0 0 0   Pain while swallowing 0 0 0   Coughing or choking while swallowing foods or liquids 0 0 0   Total Score: 0 0  0        Patient-reported         10/14/2024     8:49 AM 4/16/2025    10:41 AM 5/17/2025     9:42 AM   BEDQ Questionnaire: Discomfort/Pain Ratings   Eating solid food (meat, bread, vegetables) 0 0 0   Eating soft foods (yogurt, jello, pudding) 0 0 0   Drinking liquid 0 0 0   Total Score: 0 0  0        Patient-reported       Eckardt Questionnaire      10/14/2024     8:50 AM 4/16/2025    10:42 AM 5/17/2025     9:42 AM   Eckardt Questionnaire   Dysphagia 0 0 0   Regurgitation 0 1 1   Retrosternal Pain 0 0 0   Weight Loss (kg) 0 0 0   Total Score:  0 1  1        Patient-reported       Promis 10 Questionnaire      10/14/2024     8:51 AM 4/16/2025    10:43 AM 5/17/2025     9:43 AM   PROMIS 10 FLOWSHEET DATA   In general, would you say your health is: 2 3 3   In general, would you say your quality of life is: 2 3 3   In general, how would you rate your physical health? 2 2 2   In general, how would you rate your mental health, including your mood and your ability to think? 2 3 3   In general, how would you rate your satisfaction with your social activities and relationships? 4 4 4   In general, please rate how well you carry out your usual social activities and roles. (This includes activities at home, at work and in your community, and responsibilities as a parent, child, spouse, employee, friend, etc.) 3 4 4   To what extent are you able to carry out your everyday  physical activities such as walking, climbing stairs, carrying groceries, or moving a chair? 4 5 5   In the past 7 days, how often have you been bothered by emotional problems such as feeling anxious, depressed, or irritable? 2 2 2   In the past 7 days, how would you rate your fatigue on average? 3 2 2   In the past 7 days, how would you rate your pain on average, where 0 means no pain, and 10 means worst imaginable pain? 7 5 5   Mental health question re-calculation - no clinical value 4 4  4    Physical health question re-calculation - no clinical value 3 4  4    Pain question re-calculation - no clinical value 2 3  3    Global Mental Health Score 12 14  14    Global Physical Health Score 11 14  14    PROMIS TOTAL - SUBSCORES 23 28  28        Patient-reported           STUDIES & PROCEDURES:    EGD:   Date: 10/18/23  Impression:   Mucosal changes including intermittent subtle feline appearance and        longitudinal furrows were found in the middle third of the esophagus and        in the lower third of the esophagus. Biopsies were taken with a cold        forceps for histology in 4 quadrants 2 cm and 10 cm proximal to the GE        junction.        The gastroesophageal flap valve was visualized endoscopically and        classified as Hill Grade III (minimal fold, loose to endoscope, hiatal        hernia likely).        The stomach was normal.        The examined duodenum was normal.                                                                                    Impression:            - Esophageal mucosal changes equivocal for                          eosinophilic esophagitis. Biopsied.                          - No endoscopically identifiable reflux esophagitis.                          - Gastroesophageal flap valve classified as Hill Grade                          III (minimal fold, loose to endoscope, hiatal hernia                          likely).                          - Normal stomach.                           - Normal examined duodenum.   Pathology Report:  A.  DISTAL ESOPHAGUS, BIOPSY:  -Mildly active esophagitis with peak eosinophil count of 17/HPF (patchy distribution)  -Negative for intestinal metaplasia and dysplasia     B.  MID ESOPHAGUS, BIOPSY:  -Mildly active esophagitis with peak eosinophil count of 6/HPF (patchy distribution)  -Negative for intestinal metaplasia and dysplasia    Colonoscopy:  Date:  Impression:  Pathology Report:     EndoFLIP directed at the UES or LES (8cm (EF-325) balloon length or 16cm (EF-322) balloon length):   Date:  8cm balloon  Balloon inflation Balloon pressure CSA (mm^2) DI (mm^2/mmHg) Dmin (mm) Compliance   20 (ladmark ID)        30        40        50           16cm balloon  Balloon inflation Balloon pressure CSA (mm^2) DI (mm^2/mmHg) Dmin (mm) Compliance   30 (ladmark ID)        40        50        60        70           High Resolution Manometry:  Date:  Impression:    PH/Impedance:  Date:  Impression:     Bravo:  48 or 96hr  Date:  Impression:    CT:  Date:  Impression:    Esophagram:  Date:  Impression:    Modified Barium Esophagram:  Date:  Impression:     Prior medical records were reviewed including, but not limited to, notes from referring providers, lab work, radiographic tests, and other diagnostic tests. Pertinent results were summarized above.     History     Past Medical History:   Diagnosis Date     Ear pain 02/25/2019     Hypothyroidism due to Hashimoto's thyroiditis        Past Surgical History:   Procedure Laterality Date     ESOPHAGOSCOPY, GASTROSCOPY, DUODENOSCOPY (EGD), COMBINED N/A 10/18/2023    Procedure: ESOPHAGOGASTRODUODENOSCOPY, WITH BIOPSY;  Surgeon: Kostas Bradford MD;  Location:  GI       Social History     Socioeconomic History     Marital status:      Spouse name: Not on file     Number of children: Not on file     Years of education: Not on file     Highest education level: Not on file   Occupational History     Not on file    Tobacco Use     Smoking status: Never     Smokeless tobacco: Never   Vaping Use     Vaping status: Never Used   Substance and Sexual Activity     Alcohol use: Yes     Comment: 2 drinks a week      Drug use: Never     Sexual activity: Yes     Partners: Male     Birth control/protection: None   Other Topics Concern     Parent/sibling w/ CABG, MI or angioplasty before 65F 55M? No   Social History Narrative     Not on file     Social Drivers of Health     Financial Resource Strain: Low Risk  (8/8/2024)    Financial Resource Strain      Within the past 12 months, have you or your family members you live with been unable to get utilities (heat, electricity) when it was really needed?: No   Food Insecurity: Low Risk  (8/8/2024)    Food Insecurity      Within the past 12 months, did you worry that your food would run out before you got money to buy more?: No      Within the past 12 months, did the food you bought just not last and you didn t have money to get more?: No   Transportation Needs: Low Risk  (8/8/2024)    Transportation Needs      Within the past 12 months, has lack of transportation kept you from medical appointments, getting your medicines, non-medical meetings or appointments, work, or from getting things that you need?: No   Physical Activity: Unknown (8/8/2024)    Exercise Vital Sign      Days of Exercise per Week: 6 days      Minutes of Exercise per Session: Not on file   Stress: Stress Concern Present (8/8/2024)    [employer] - Occupational Stress Questionnaire      Feeling of Stress : To some extent   Social Connections: Unknown (8/8/2024)    Social Connection and Isolation Panel [NHANES]      Frequency of Communication with Friends and Family: Not on file      Frequency of Social Gatherings with Friends and Family: More than three times a week      Attends Sikhism Services: Not on file      Active Member of Clubs or Organizations: Not on file      Attends Club or  Organization Meetings: Not on file      Marital Status: Not on file   Interpersonal Safety: Low Risk  (5/21/2024)    Interpersonal Safety      Do you feel physically and emotionally safe where you currently live?: Yes      Within the past 12 months, have you been hit, slapped, kicked or otherwise physically hurt by someone?: No      Within the past 12 months, have you been humiliated or emotionally abused in other ways by your partner or ex-partner?: No   Housing Stability: Low Risk  (8/8/2024)    Housing Stability      Do you have housing? : Yes      Are you worried about losing your housing?: No       Family History   Problem Relation Age of Onset     ALS Mother      Skin Cancer Father      Breast Cancer Maternal Grandmother      Family history reviewed and edited as appropriate    Medications and Allergies:     Outpatient Encounter Medications as of 5/22/2025   Medication Sig Dispense Refill     amitriptyline (ELAVIL) 10 MG tablet Take 1 tablet (10 mg) by mouth at bedtime. 90 tablet 3     cholecalciferol 25 MCG (1000 UT) TABS        FLUoxetine (PROZAC) 20 MG capsule Take 1 capsule (20 mg) by mouth daily. 90 capsule 3     folic acid (FOLVITE) 1 MG tablet TAKE 1 TABLET(1 MG) BY MOUTH DAILY 90 tablet 0     hydrocortisone 2.5 % ointment Apply topically 2 times daily. To all areas of itch/rash when flared. Stop when rash/itch resolves. 20 g 1     ketoconazole (NIZORAL) 2 % external cream Apply to rash daily until rash resolves when flared 15 g 1     levothyroxine (SYNTHROID/LEVOTHROID) 88 MCG tablet Take 1 tablet (88 mcg) by mouth daily. 90 tablet 3     linaclotide (LINZESS) 72 MCG capsule Take 1 capsule (72 mcg) by mouth every morning (before breakfast) 90 capsule 3     liothyronine (CYTOMEL) 5 MCG tablet TAKE 1 TABLET(5 MCG) BY MOUTH DAILY 90 tablet 3     MAGNESIUM GLYCINATE PO Take 4 capsules by mouth At Bedtime       naratriptan (AMERGE) 2.5 MG tablet Take 1 tablet (2.5 mg) by mouth at onset of headache for  migraine. May repeat in 4 hours. Max 2 tablets/24 hours. 18 tablet 9     norethindrone (MICRONOR) 0.35 MG tablet Take 1 tablet (0.35 mg) by mouth daily. 84 tablet 3     rimegepant (NURTEC) 75 MG ODT tablet Place 1 tablet (75 mg) under the tongue every 48 hours 16 tablet 11     topiramate (TOPAMAX) 50 MG tablet Take 1 tablet (50 mg) by mouth 2 times daily 180 tablet 3     Vonoprazan Fumarate 10 MG TABS Take 1 tablet by mouth daily. 90 tablet 3     Facility-Administered Encounter Medications as of 5/22/2025   Medication Dose Route Frequency Provider Last Rate Last Admin     Botulinum Toxin Type A (BOTOX) 200 units injection 155 Units  155 Units Intramuscular See Admin Instructions    155 Units at 05/19/25 0939        No Known Allergies     Review of systems:  A full 10 point review of systems was obtained and was negative except for the pertinent positives and negatives stated within the HPI.    Objective Findings:   Physical Exam:    Constitutional: There were no vitals taken for this visit.  General: Alert, cooperative, no distress, well-appearing  Head: Atraumatic, normocephalic, no obvious abnormalities   Eyes: EOMI, Sclera anicteric, no obvious conjunctival hemorrhage   Nose: Nares normal, no obvious malformation, no obvious rhinorrhea   Respiratory: normal appearing, no cough  Musculoskeletal: Range of motion intact, no obvious strength deficit  Skin: No jaundice, no obvious rash  Neurologic: AAOx3, no obvious neurologic abnormality  Psychiatric: Normal Affect, appropriate mood  Extremities: No obvious edema, no obvious malformation     Labs, Radiology, Pathology     Lab Results   Component Value Date    WBC 6.8 04/07/2021    HGB 14.0 04/07/2021     04/07/2021    ALT 25 04/07/2021    AST 24 04/07/2021     04/07/2021    BUN 11 04/07/2021    CO2 26 04/07/2021    TSH 1.40 08/02/2024    TSH 2.45 09/06/2023    TSH 2.63 02/07/2023    INR 1.01 04/07/2021        Liver Function Studies -   Recent Labs   Lab  Test 04/07/21  1143   PROTTOTAL 7.5   ALBUMIN 4.4   BILITOTAL 0.4   ALKPHOS 47   AST 24   ALT 25          Patient Active Problem List    Diagnosis Date Noted     Gastroesophageal reflux disease without esophagitis 10/15/2024     Priority: Medium     Chronic sore throat 10/15/2024     Priority: Medium     Chronic pain of both ears 10/15/2024     Priority: Medium     Gastroesophageal reflux disease with esophagitis without hemorrhage 08/12/2024     Priority: Medium     Eosinophilic esophagitis 08/12/2024     Priority: Medium     Chronic migraine without aura without status migrainosus, not intractable 03/29/2022     Priority: Medium     PMDD (premenstrual dysphoric disorder) 04/07/2021     Priority: Medium     Menorrhagia with regular cycle 04/07/2021     Priority: Medium     Hypothyroidism due to Hashimoto's thyroiditis 04/07/2021     Priority: Medium      Assessment and Plan   Assessment:    Dinorah Mai is a 45 year old female with hypothyroid, constipation, migraine, depression who is presenting as a follow up patient to me previously underwent endoscopy within our division in consultation at the request of Dr. Beyer with a chief complaint of reflux.    The patient continues to have symptoms of reflux on omeprazole daily with an additional dose in the evening if she knows she will consume something that makes her reflux worse. She has noted side effects with PPI being anxiety and bloating. Her insurance company has not approved vonoprazan 10mg for the non erosive esophagitis indication and will not do so until she has tried every PPI on formulary. She has tried omeprazole and lansoprazole.     We will try esomeprazole OTC followed by pantoprazole prescription followed by rabeprazole prescription and then if needed we will try to obtain vonoprazan again.       1. Gastroesophageal reflux disease without esophagitis       No orders of the defined types were placed in this encounter.       Plan:  Please trial  esomeprazole (nexium) 20mg daily and add an additional PM dose if needed for 2 weeks.   Please send a message after 2 weeks with an update. If it works and is without side effect then a prescription will be sent. If it does not work we will trial pantoprazole (protonix) for 2 weeks. The drug to follow that will be rabeprazole and following that will be vonoprazan.   The insurance company is requiring that we try all PPIs prior to them approving vonoprazan as an option.     Follow up plan:   Return to clinic 6 months and as needed.    The risks and benefits of my recommendations, as well as other treatment options were discussed with the patient and any available family today. All questions were answered.     Follow up: As planned above. 20 minutes was spent on the date of the encounter doing (chart review/review of outside records/review of test results/interpretation of tests/patient visit/documentation/discussion with other provider(s)/discussion with family/and coordinating care).      The patient verbalized understanding of the plan and was appreciative for the time spent and information provided during the office visit.     Author:   Levar Peterson DO  Associate Professor of Medicine  Director, Esophageal Disorders Program  Director of Endoscopy  Division of Gastroenterology, Hepatology, and Nutrition  [facility]      Dr. Peterson speaks for [employer] regarding dupilumab and has participated in advisory boards for the medication. He receives income for these activities. When discussing the medication, patients were informed of Dr. Peterson's role and potential conflict of interest. All questions and/or concerns were answered during the encounter.    Dr. Peterson speaks for [employer] regarding vonoprazan. He receives income for these activities. When discussing the medication, patients were informed of Dr. Peterson's role and potential conflict of interest. All questions and/or concerns were  answered during the encounter.     Documentation assisted by voice recognition and documentation system.      Again, thank you for allowing me to participate in the care of your patient.        Sincerely,        Levar Peterson, DO    Electronically signed

## 2025-05-25 SDOH — HEALTH STABILITY: PHYSICAL HEALTH: ON AVERAGE, HOW MANY DAYS PER WEEK DO YOU ENGAGE IN MODERATE TO STRENUOUS EXERCISE (LIKE A BRISK WALK)?: 6 DAYS

## 2025-05-25 SDOH — HEALTH STABILITY: PHYSICAL HEALTH: ON AVERAGE, HOW MANY MINUTES DO YOU ENGAGE IN EXERCISE AT THIS LEVEL?: 60 MIN

## 2025-05-25 ASSESSMENT — SOCIAL DETERMINANTS OF HEALTH (SDOH): HOW OFTEN DO YOU GET TOGETHER WITH FRIENDS OR RELATIVES?: MORE THAN THREE TIMES A WEEK

## 2025-05-29 ENCOUNTER — PATIENT OUTREACH (OUTPATIENT)
Dept: CARE COORDINATION | Facility: CLINIC | Age: 45
End: 2025-05-29

## 2025-05-29 ENCOUNTER — OFFICE VISIT (OUTPATIENT)
Dept: FAMILY MEDICINE | Facility: CLINIC | Age: 45
End: 2025-05-29
Attending: NURSE PRACTITIONER
Payer: COMMERCIAL

## 2025-05-29 VITALS
RESPIRATION RATE: 20 BRPM | TEMPERATURE: 98.1 F | DIASTOLIC BLOOD PRESSURE: 60 MMHG | HEART RATE: 87 BPM | HEIGHT: 63 IN | SYSTOLIC BLOOD PRESSURE: 97 MMHG | WEIGHT: 129 LBS | OXYGEN SATURATION: 99 % | BODY MASS INDEX: 22.86 KG/M2

## 2025-05-29 DIAGNOSIS — Z00.00 ROUTINE GENERAL MEDICAL EXAMINATION AT A HEALTH CARE FACILITY: Primary | ICD-10-CM

## 2025-05-29 DIAGNOSIS — G43.709 CHRONIC MIGRAINE WITHOUT AURA WITHOUT STATUS MIGRAINOSUS, NOT INTRACTABLE: ICD-10-CM

## 2025-05-29 RX ORDER — FOLIC ACID 1 MG/1
1000 TABLET ORAL
Qty: 90 TABLET | Refills: 3 | Status: SHIPPED | OUTPATIENT
Start: 2025-05-29

## 2025-05-29 NOTE — PATIENT INSTRUCTIONS
Patient Education       Things to Know About Hormone Therapy      Here is more helpful info for your learning the book suggestions are at the bottom.     I also recommend the podcast Dr. Viramontes's Inside Information: the menopause podcast. She also has a website and books that are great.     Another wonderful doctor is Dr. Jessie Dyson, she has great talks about fitness and nutrition and also has podcasts.     Menopause is a normal life event for women - it is not an illness or medical condition.  Every person's experience of menopause is different.  Some women have many symptoms that interfere with life, and others may not experience any charge other than their period stops.     Many women with symptoms often suffer in silence and do not realize how effective and safe hormone therapy can be to improving their symptoms and quality of life.  Most common symptoms:  Hot flashes  Night Sweats  Painful sex and/or dry vagina     The most effective way to treat hot flashes, night sweats and painful dry vagina is with hormone therapy.       There are three categories of hormone therapy     If you are still having periods, and it is safe for you, a low dose birth control pill with both estrogen and progesterone can work very well to provide birth control and relieve symptoms.       Estrogen Therapy or ET        means estrogen only therapy.  Estrogen is the hormone that provides the most menopausal symptom relief.  It is a much lower dose than used in a low dose birth control pill.  Estrogen only therapy is only for women who had had their uterus removed with a surgery.     Estrogen Progesterone Therapy or EPT means taking both estrogen and progesterone.  The progesterone protects the uterus from developing uterine cancer from estrogen alone.  The progesterone can come from taking a pill, having a Kyleena or Mirena IUD or using a combined product        There are two basic ways to use hormone therapy:     Systemic  which means to circulate in the blood stream to all parts of the body.  This is given either as an oral tablet, a patch, or a vaginal ring .  Systemic hormones are used to treat hot flashes and night sweats      Local which means the product only affects a specific or localized area of the body.  This can be given as a vaginal cream, ring or tablet and is used to treat vaginal symptoms of menopause.  Virtually no risk because the blood level of estrogen and progesterone is not increasing     Hdez time for using systemic hormone therapy     You are having hot flashes and/or night sweats  You are within 10 years of menopause and under age 60     Benefits of using systemic hormone therapy may include     Effectively treats hot flashes, night sweats and vaginal dryness and pain  Helps prevent bone loss   May improve mood swings  May improve sex drive or low libido   Helps reduce risk of future cardiovascular disease, type 2 Diabetes, osteoarthritis, and dementia when used within 10 yrs of menopause      Risks of using systemic hormone therapy      Increased risk of developing a clot, rare, and no increases risk when using estrogen through the skin such as with a patch, gel or spray.  Slight increased risk of breast cancer, about the same increase in risks as having a glass or two of wine each night or being overweight (and these are not additive).  When using Prometrium, the same progesterone hormone the ovary makes, there is less of an increase in risk.       Side Effects      You may have vaginal bleeding again initially, if bleeding persists beyond 6 months or if you daily bleeding or 10 days of heavy bleeding please let us know so we can screen for uterine cancer  Breast tenderness - generally improves after 6 months on hormone therapy.      For more information about Menopause     A book Hot and Bothered by Carlita Sevilla  A podcast Venus Rojas 'From PMS to Menopause: How to Hack Your Hormones'   The book The New  Rules of Menopause; A [facility] Guide to Perimenopause and Beyond by Dr Rylee Dolan  The North American Menopause Society   [facility] - Menopause         References:   Menopause Practice; A Clinician's Guide, 6th Edition, The North American Menopause Society   The New Rules of Menopause; A [facility] Guide to Perimenopause and Beyond, Rylee Dolan M.D., M.B.A., Director of [facility]             Thank you for coming to see me today! Here are a couple of pieces of information about my schedule and communication practices.     I am not in the clinic on Tuesdays. Non-urgent calls and messages received on Tuesdays will be addressed as soon as I am able when I am back in the office on the next business day. Urgent calls will be addressed by a covering clinician.       If lab work was done today as part of your evaluation you will generally be contacted via [place], mail, or phone with the results within 7-10 days.  If there is an alarming/concerning result we will contact you by phone. Lab results come back at varying times, I generally wait until all labs are resulted before making comments on results. Please note, labs are automatically released to [facility] [place] once available, but it may take a couple of days for my interpretation note to appear.      I try very hard to respond to medical messages with 2 business days of receiving them. Occasionally it takes me longer if I am trying to figure out the best way to respond and need to seek guidance, do some research or dig deeper into your medical history to come up with a helpful response.      If you need refills please contact your pharmacist. They will send a refill request to me to review. Please allow 3-5 business days for us to respond to all refill requests.      Please call or send a medical message with any questions or concerns. Thank you for trusting me to be part of your healthcare team!        Dr. Juan Manuel Park    Preventive  Care Advice   This is general advice given by our system to help you stay healthy. However, your care team may have specific advice just for you. Please talk to your care team about your preventive care needs.  Nutrition  Eat 5 or more servings of fruits and vegetables each day.  Try wheat bread, brown rice and whole grain pasta (instead of white bread, rice, and pasta).  Get enough calcium and vitamin D. Check the label on foods and aim for 100% of the RDA (recommended daily allowance).  Lifestyle  Exercise at least 150 minutes each week  (30 minutes a day, 5 days a week).  Do muscle strengthening activities 2 days a week. These help control your weight and prevent disease.  No smoking.  Wear sunscreen to prevent skin cancer.  Have a dental exam and cleaning every 6 months.  Yearly exams  See your health care team every year to talk about:  Any changes in your health.  Any medicines your care team has prescribed.  Preventive care, family planning, and ways to prevent chronic diseases.  Shots (vaccines)   HPV shots (up to age 26), if you've never had them before.  Hepatitis B shots (up to age 59), if you've never had them before.  COVID-19 shot: Get this shot when it's due.  Flu shot: Get a flu shot every year.  Tetanus shot: Get a tetanus shot every 10 years.  Pneumococcal, hepatitis A, and RSV shots: Ask your care team if you need these based on your risk.  Shingles shot (for age 50 and up)  General health tests  Diabetes screening:  Starting at age 35, Get screened for diabetes at least every 3 years.  If you are younger than age 35, ask your care team if you should be screened for diabetes.  Cholesterol test: At age 39, start having a cholesterol test every 5 years, or more often if advised.  Bone density scan (DEXA): At age 50, ask your care team if you should have this scan for osteoporosis (brittle bones).  Hepatitis C: Get tested at least once in your life.  STIs (sexually transmitted infections)  Before age  24: Ask your care team if you should be screened for STIs.  After age 24: Get screened for STIs if you're at risk. You are at risk for STIs (including HIV) if:  You are sexually active with more than one person.  You don't use condoms every time.  You or a partner was diagnosed with a sexually transmitted infection.  If you are at risk for HIV, ask about PrEP medicine to prevent HIV.  Get tested for HIV at least once in your life, whether you are at risk for HIV or not.  Cancer screening tests  Cervical cancer screening: If you have a cervix, begin getting regular cervical cancer screening tests starting at age 21.  Breast cancer scan (mammogram): If you've ever had breasts, begin having regular mammograms starting at age 40. This is a scan to check for breast cancer.  Colon cancer screening: It is important to start screening for colon cancer at age 45.  Have a colonoscopy test every 10 years (or more often if you're at risk) Or, ask your provider about stool tests like a FIT test every year or Cologuard test every 3 years.  To learn more about your testing options, visit:   .  For help making a decision, visit:   https://bit.ly/mt93146.  Prostate cancer screening test: If you have a prostate, ask your care team if a prostate cancer screening test (PSA) at age 55 is right for you.  Lung cancer screening: If you are a current or former smoker ages 50 to 80, ask your care team if ongoing lung cancer screenings are right for you.  For informational purposes only. Not to replace the advice of your health care provider. Copyright   2023 [facility] [employer] All rights reserved. Clinically reviewed by the [facility] Transitions Program. [employer] [id number] - REV 01/24.

## 2025-05-29 NOTE — PROGRESS NOTES
Preventive Care Visit  [facility]  Juan Manuel Park DO, Family Medicine  May 29, 2025      Assessment & Plan     Routine general medical examination at a health care facility  Vitals: WNL  Exercise: active  Diet: good  Labs: lipids, a1c  Contraception:  not taking micronor due to weight gain  Immunizations: UTD      Pap: UTD due 2029  Mammogram: pt states mammo was done 2/2025, no records in epic     Colon cancer screen:  Will begin colon cancer screening at age 45.  No increased risk factors.  Perimenopause symptoms: she will follow up with ob/gyn; resources listed in AVS    - Lipid panel reflex to direct LDL Fasting  - Hemoglobin A1c    Chronic migraine without aura without status migrainosus, not intractable  - folic acid (FOLVITE) 1 MG tablet  Dispense: 90 tablet; Refill: 3      Counseling  Appropriate preventive services were addressed with this patient via screening, questionnaire, or discussion as appropriate for fall prevention, nutrition, physical activity, Tobacco-use cessation, social engagement, weight loss and cognition.  Checklist reviewing preventive services available has been given to the patient.  Reviewed patient's diet, addressing concerns and/or questions.           Tani Copeland is a 45 year old, presenting for the following:  Physical        5/29/2025     2:30 PM   Additional Questions   Roomed by Nelia   Accompanied by Self         5/29/2025     2:30 PM   Patient Reported Additional Medications   Patient reports taking the following new medications none          HPI    Pap/hpv-due 2029    PMDD  -prozac 20mg    Hypothyroidism  -synthroid 88mcg  -cytomel 5mcg  -managed by endo    Migraine  -amitriptyline 10mg  -folic acid   -naratriptan prn  -nurtec prn  -topiramate 50mg BID  -botox prn  -managed by neurology    GERD  EOE  -vonoprazan fumarate daily  -sees GI      Advance Care Planning  Discussed advance care planning with patient; informed AVS has link to  [facility]oring Choices.        5/25/2025   General Health   How would you rate your overall physical health? (!) FAIR   Feel stress (tense, anxious, or unable to sleep) Not at all         5/25/2025   Nutrition   Three or more servings of calcium each day? Yes   Diet: Regular (no restrictions)   How many servings of fruit and vegetables per day? 4 or more   How many sweetened beverages each day? 0-1         5/25/2025   Exercise   Days per week of moderate/strenous exercise 6 days   Average minutes spent exercising at this level 60 min         5/25/2025   Social Factors   Frequency of gathering with friends or relatives More than three times a week   Worry food won't last until get money to buy more No   Food not last or not have enough money for food? No   Do you have housing? (Housing is defined as stable permanent housing and does not include staying outside in a car, in a tent, in an abandoned building, in an overnight shelter, or couch-surfing.) Yes   Are you worried about losing your housing? No   Lack of transportation? No   Unable to get utilities (heat,electricity)? No         5/25/2025   Dental   Dentist two times every year? Yes         Today's PHQ-2 Score:       5/28/2025     3:17 PM   PHQ-2 ( 1999 Pf[employer])   Q1: Little interest or pleasure in doing things 0   Q2: Feeling down, depressed or hopeless 0   PHQ-2 Score 0    Q1: Little interest or pleasure in doing things Not at all   Q2: Feeling down, depressed or hopeless Not at all   PHQ-2 Score 0       Patient-reported           5/25/2025   Substance Use   Alcohol more than 3/day or more than 7/wk No   Do you use any other substances recreationally? No     Social History     Tobacco Use    Smoking status: Never    Smokeless tobacco: Never   Vaping Use    Vaping status: Never Used   Substance Use Topics    Alcohol use: Yes     Comment: 2 drinks a week     Drug use: Never         6/21/2024   LAST FHS-7 RESULTS   1st degree relative breast or ovarian cancer No   Any  "relative bilateral breast cancer No   Any male have breast cancer No   Any ONE woman have BOTH breast AND ovarian cancer No   Any woman with breast cancer before 50yrs No   2 or more relatives with breast AND/OR ovarian cancer No   2 or more relatives with breast AND/OR bowel cancer No           5/25/2025   One time HIV Screening   Previous HIV test? Yes         5/25/2025   STI Screening   New sexual partner(s) since last STI/HIV test? No     History of abnormal Pap smear: No - age 30- 64 PAP with HPV every 5 years recommended        Latest Ref Rng & Units 5/6/2024     1:22 PM 6/10/2019     5:18 PM   PAP / HPV   PAP  Negative for Intraepithelial Lesion or Malignancy (NILM)     HPV 16 DNA Negative Negative     HPV 18 DNA Negative Negative     Other HR HPV Negative Negative     PAP-ABSTRACT   See Scanned Document      ASCVD Risk   The ASCVD Risk score (Shyanne ROSADO, et al., 2019) failed to calculate for the following reasons:    Cannot find a previous HDL lab    Cannot find a previous total cholesterol lab        5/25/2025   Contraception/Family Planning   Questions about contraception or family planning No       Reviewed and updated as needed this visit by Provider   Tobacco   Meds  Problems  Med Hx  Surg Hx  Fam Hx  Soc Hx Sexual   Activity               Objective    Exam  BP 97/60 (BP Location: Right arm, Patient Position: Chair, Cuff Size: Adult Regular)   Pulse 87   Temp 98.1  F (36.7  C) (Oral)   Resp 20   Ht 1.6 m (5' 3\")   Wt 58.5 kg (129 lb)   LMP 05/22/2025 (Exact Date)   SpO2 99%   BMI 22.85 kg/m     Estimated body mass index is 22.85 kg/m  as calculated from the following:    Height as of this encounter: 1.6 m (5' 3\").    Weight as of this encounter: 58.5 kg (129 lb).    Physical Exam  Constitutional:       General: She is not in acute distress.     Appearance: She is well-developed.   HENT:      Head: Normocephalic and atraumatic.      Right Ear: Tympanic membrane, ear canal and " external ear normal.      Left Ear: Tympanic membrane, ear canal and external ear normal.      Nose: Nose normal.      Mouth/Throat:      Mouth: Mucous membranes are moist.      Pharynx: Oropharynx is clear. No oropharyngeal exudate.   Eyes:      Conjunctiva/sclera: Conjunctivae normal.      Pupils: Pupils are equal, round, and reactive to light.   Neck:      Thyroid: No thyroid mass, thyromegaly or thyroid tenderness.   Cardiovascular:      Rate and Rhythm: Normal rate and regular rhythm.      Heart sounds: Normal heart sounds. No murmur heard.  Pulmonary:      Effort: Pulmonary effort is normal.      Breath sounds: No wheezing or rales.   Chest:   Breasts:     Right: Normal.      Left: Normal.   Abdominal:      General: Abdomen is flat.   Musculoskeletal:      Cervical back: Normal range of motion and neck supple. No rigidity or tenderness.   Lymphadenopathy:      Cervical: No cervical adenopathy.      Upper Body:      Right upper body: No supraclavicular, axillary or pectoral adenopathy.      Left upper body: No supraclavicular, axillary or pectoral adenopathy.   Skin:     General: Skin is warm and dry.      Findings: No rash.   Neurological:      General: No focal deficit present.      Mental Status: She is alert and oriented to person, place, and time. Mental status is at baseline.   Psychiatric:         Mood and Affect: Mood normal.         Behavior: Behavior normal.         Thought Content: Thought content normal.               Signed Electronically by: Juan Manuel Park DO

## 2025-06-02 ENCOUNTER — MYC MEDICAL ADVICE (OUTPATIENT)
Dept: FAMILY MEDICINE | Facility: CLINIC | Age: 45
End: 2025-06-02
Payer: COMMERCIAL

## 2025-06-03 ENCOUNTER — TELEPHONE (OUTPATIENT)
Dept: NEUROLOGY | Facility: CLINIC | Age: 45
End: 2025-06-03
Payer: COMMERCIAL

## 2025-06-03 NOTE — TELEPHONE ENCOUNTER
Patient confirmed scheduled appointment:  Date: 8/12/25  Time: 12:30  Visit type: Botox  Provider: Lexii Dyer  [place] [facility] [place]  Testing/imaging: NA  Additional notes: Reschedule

## 2025-06-04 DIAGNOSIS — K59.04 CHRONIC IDIOPATHIC CONSTIPATION: ICD-10-CM

## 2025-06-04 RX ORDER — LINACLOTIDE 72 UG/1
CAPSULE, GELATIN COATED ORAL
Qty: 90 CAPSULE | Refills: 3 | Status: SHIPPED | OUTPATIENT
Start: 2025-06-04

## 2025-06-04 NOTE — TELEPHONE ENCOUNTER
I talked to pt.  She is still taking the med.  PT has success with this med.  GI is not rx it.  Pt was rx the med by old PCP, Alicia Beyer.    She would like Dr. Park to rx the Linzess.  Thanks!  Vida, Triage RN  [facility]/ [phone]

## 2025-06-04 NOTE — TELEPHONE ENCOUNTER
Per my note on 5/29/25, pt said she sees GI. Can you call to see if she still takes this med, and if GI is managing it?    Dr. Park

## 2025-06-11 ENCOUNTER — VIRTUAL VISIT (OUTPATIENT)
Dept: FAMILY MEDICINE | Facility: CLINIC | Age: 45
End: 2025-06-11
Payer: COMMERCIAL

## 2025-06-11 DIAGNOSIS — R53.82 CHRONIC FATIGUE: ICD-10-CM

## 2025-06-11 DIAGNOSIS — G89.4 CHRONIC PAIN SYNDROME: Primary | ICD-10-CM

## 2025-06-11 PROCEDURE — 98006 SYNCH AUDIO-VIDEO EST MOD 30: CPT | Performed by: STUDENT IN AN ORGANIZED HEALTH CARE EDUCATION/TRAINING PROGRAM

## 2025-06-11 PROCEDURE — 1125F AMNT PAIN NOTED PAIN PRSNT: CPT | Performed by: STUDENT IN AN ORGANIZED HEALTH CARE EDUCATION/TRAINING PROGRAM

## 2025-06-11 NOTE — PROGRESS NOTES
Dinorah is a 45 year old who is being evaluated via a billable video visit.    How would you like to obtain your AVS? MyChart  If the video visit is dropped, the invitation should be resent by: Text to cell phone: [phone]  Will anyone else be joining your video visit? No      Assessment & Plan     Chronic pain syndrome  Chronic fatigue  Elected to try LDN for chronic pain and fatigue. Discussed there are small randomized controlled trials and observational studies that suggest that LDN may reduce pain severity and improve quality of life in fibromyalgia and other centralized pain conditions. However, the strength of evidence is low due to small sample sizes, high risk of bias, and lack of large, well-powered trials. Discussed good safety profile of this medication and minimal to no side effects. Her  takes modafinil w/good success. Discussed I don't prescribe this-this would have to be through neuro/sleep medicine.    Plan:  -take 1mg LDN daily for 14 days, then increase to 2mg daily  -will mychart me in 4-6 weeks to check in  -if no improvement, will increase dose further (will plan to increase by 0.5/1mg every 2-4 weeks until at 4.5mg or highest tolerated dose at or above 3mg)        - COMPOUNDED NON-CONTROLLED SUBSTANCE (CMPD RX) - PHARMACY TO MIX COMPOUNDED MEDICATION  Dispense: 90 tablet; Refill: 1        Subjective   Dinorah is a 45 year old, presenting for the following health issues:  Medication Follow-up (Alternative medication )        6/11/2025     8:10 AM   Additional Questions   Roomed by Desean AGUAYO     History of Present Illness       Hypothyroidism:     Since last visit, patient describes the following symptoms::  Fatigue and Weight gain    Weight gain::  Less than 5 lbs.    She eats 4 or more servings of fruits and vegetables daily.She consumes 0 sweetened beverage(s) daily.She exercises with enough effort to increase her heart rate 60 or more minutes per day.  She exercises with enough effort  to increase her heart rate 6 days per week.   She is taking medications regularly.        Frustrated, has chronic conditions that medications never seem to fix/help  Migraines much better-->not a primary concern anymore  Not taking amitriptyline daily due to fatigue/sleepiness  Chronic shoulder/neck pain  Low energy  Tried many dietary changes, supplements, lifestyle changes  Modafanil- takes this        Objective    Vitals - Patient Reported  Weight (Patient Reported): 59 kg (130 lb)  Pain Score: Moderate Pain (4)  Pain Loc: Shoulder (neck)    Vitals:  No vitals were obtained today due to virtual visit.    Physical Exam   GENERAL: alert and no distress  EYES: Eyes grossly normal to inspection.  No discharge or erythema, or obvious scleral/conjunctival abnormalities.  RESP: No audible wheeze, cough, or visible cyanosis.    SKIN: Visible skin clear. No significant rash, abnormal pigmentation or lesions.  NEURO: Cranial nerves grossly intact.  Mentation and speech appropriate for age.  PSYCH: Appropriate affect, tone, and pace of words      Video-Visit Details    Type of service:  Video Visit   Originating Location (pt. Location): Home  Distant Location (provider location):  Off-site  Platform used for Video Visit: Marcelino  Signed Electronically by: Juan Manuel Park DO

## 2025-06-11 NOTE — PATIENT INSTRUCTIONS
take 1mg low dose naltrexone daily for 14 days, then increase to 2mg daily  mychart me in 4-6 weeks to check in  If no improvement, we can increase the dose      [facility]  [address] [place], [address]  [phone]    Working Hours  Monday: 8:00 AM - 5:00 PM  Tuesday: 8:00 AM - 5:00 PM  Wednesday: 8:00 AM - 5:00 PM  Thursday: 8:00 AM - 5:00 PM  Friday: 8:00 AM - 5:00 PM    Dr. Park

## 2025-06-12 ENCOUNTER — TELEPHONE (OUTPATIENT)
Dept: GASTROENTEROLOGY | Facility: CLINIC | Age: 45
End: 2025-06-12
Payer: COMMERCIAL

## 2025-06-12 DIAGNOSIS — K21.9 GASTROESOPHAGEAL REFLUX DISEASE WITHOUT ESOPHAGITIS: Primary | ICD-10-CM

## 2025-06-12 DIAGNOSIS — K20.0 EOSINOPHILIC ESOPHAGITIS: ICD-10-CM

## 2025-06-12 RX ORDER — PANTOPRAZOLE SODIUM 40 MG/1
40 TABLET, DELAYED RELEASE ORAL DAILY
Qty: 90 TABLET | Refills: 3 | Status: SHIPPED | OUTPATIENT
Start: 2025-06-12

## 2025-06-23 DIAGNOSIS — G43.709 CHRONIC MIGRAINE WITHOUT AURA WITHOUT STATUS MIGRAINOSUS, NOT INTRACTABLE: ICD-10-CM

## 2025-06-23 NOTE — TELEPHONE ENCOUNTER
Neuroscience Clinic Task Note    TASK    Neurology Rx Refill  Medication topiramate (TOPAMAX) 50 MG tablet    Dose Take 1 tablet (50 mg) by mouth 2 times daily - Oral   Last refill ordered (m/d/y) 06/10/2024   Last quantity ordered 180   Last # refills 3   Last clinic visit with ordering provider (m/d/y) 12/21/2023 (Botox since then)   Next clinic visit with ordering provider (m/d/y) 08/20/2025   All pertinent protocol data (lab date/result)    Pertinent information from patient's message        FOLLOW-UP      ADDITIONAL COMMENTS      Matilda Segundo

## 2025-06-24 ENCOUNTER — LAB (OUTPATIENT)
Dept: LAB | Facility: CLINIC | Age: 45
End: 2025-06-24
Payer: COMMERCIAL

## 2025-06-24 DIAGNOSIS — Z00.00 ROUTINE GENERAL MEDICAL EXAMINATION AT A HEALTH CARE FACILITY: ICD-10-CM

## 2025-06-24 DIAGNOSIS — E04.1 SOLITARY THYROID NODULE: ICD-10-CM

## 2025-06-24 LAB
ALBUMIN SERPL BCG-MCNC: 4.4 G/DL (ref 3.5–5.2)
CALCIUM SERPL-MCNC: 10 MG/DL (ref 8.8–10.4)
CHOLEST SERPL-MCNC: 175 MG/DL
EST. AVERAGE GLUCOSE BLD GHB EST-MCNC: 97 MG/DL
FASTING STATUS PATIENT QL REPORTED: NO
HBA1C MFR BLD: 5 % (ref 0–5.6)
HDLC SERPL-MCNC: 49 MG/DL
LDLC SERPL CALC-MCNC: 113 MG/DL
NONHDLC SERPL-MCNC: 126 MG/DL
PTH-INTACT SERPL-MCNC: 23 PG/ML (ref 15–65)
TRIGL SERPL-MCNC: 64 MG/DL

## 2025-06-24 PROCEDURE — 82040 ASSAY OF SERUM ALBUMIN: CPT

## 2025-06-24 PROCEDURE — 83036 HEMOGLOBIN GLYCOSYLATED A1C: CPT

## 2025-06-24 PROCEDURE — 80061 LIPID PANEL: CPT

## 2025-06-24 PROCEDURE — 82310 ASSAY OF CALCIUM: CPT

## 2025-06-24 PROCEDURE — 83970 ASSAY OF PARATHORMONE: CPT

## 2025-06-24 RX ORDER — TOPIRAMATE 50 MG/1
50 TABLET, FILM COATED ORAL 2 TIMES DAILY
Qty: 180 TABLET | Refills: 3 | Status: SHIPPED | OUTPATIENT
Start: 2025-06-24

## 2025-06-25 ENCOUNTER — MYC MEDICAL ADVICE (OUTPATIENT)
Dept: FAMILY MEDICINE | Facility: CLINIC | Age: 45
End: 2025-06-25
Payer: COMMERCIAL

## 2025-06-25 ENCOUNTER — RESULTS FOLLOW-UP (OUTPATIENT)
Dept: FAMILY MEDICINE | Facility: CLINIC | Age: 45
End: 2025-06-25

## 2025-06-26 ENCOUNTER — RESULTS FOLLOW-UP (OUTPATIENT)
Dept: ENDOCRINOLOGY | Facility: CLINIC | Age: 45
End: 2025-06-26

## 2025-07-07 ENCOUNTER — MYC MEDICAL ADVICE (OUTPATIENT)
Dept: FAMILY MEDICINE | Facility: CLINIC | Age: 45
End: 2025-07-07
Payer: COMMERCIAL

## 2025-07-07 DIAGNOSIS — G89.4 CHRONIC PAIN SYNDROME: ICD-10-CM

## 2025-07-08 ENCOUNTER — TELEPHONE (OUTPATIENT)
Dept: NEUROLOGY | Facility: CLINIC | Age: 45
End: 2025-07-08
Payer: COMMERCIAL

## 2025-07-08 DIAGNOSIS — G43.709 CHRONIC MIGRAINE WITHOUT AURA WITHOUT STATUS MIGRAINOSUS, NOT INTRACTABLE: ICD-10-CM

## 2025-07-08 NOTE — TELEPHONE ENCOUNTER
Neuroscience Clinic Task Note    TASK    Neurology Rx Refill  Medication rimegepant (NURTEC) 75 MG ODT tablet    Dose Place 1 tablet (75 mg) under the tongue every 48 hours - Sublingual    Last refill ordered (m/d/y) 06/10/2024   Last quantity ordered 16   Last # refills 11   Last clinic visit with ordering provider (m/d/y) 05/19/2025   Next clinic visit with ordering provider (m/d/y) 08/20/2025   All pertinent protocol data (lab date/result)    Pertinent information from patient's message        FOLLOW-UP      ADDITIONAL COMMENTS  PA is needed. Submitted to PA team.     Matilda Segundo

## 2025-07-08 NOTE — TELEPHONE ENCOUNTER
Prior Authorization Retail Medication Request    Medication/Dose: rimegepant (NURTEC) 75 MG ODT tablet   Diagnosis and ICD code (if different than what is on RX):    New/renewal/insurance change PA/secondary ins. PA:  Previously Tried and Failed:    Rationale:      Insurance   Primary: [facility] - [facility] INDIVIDUAL FAMILY PLANS WITH [employer]   Insurance ID:  [id number]       Secondary (if applicable):  Insurance ID:      Pharmacy Information (if different than what is on RX)  Name:  [id number]  Phone:  [phone]   Fax:  [phone]     Clinic Information  Preferred routing pool for dept communication:

## 2025-07-09 NOTE — TELEPHONE ENCOUNTER
Retail Pharmacy Prior Authorization Team   Phone: [phone]    PA Initiation    Medication: NURTEC 75 MG PO TBDP  Insurance Company: [employer] - Phone [phone] Fax [phone]  Pharmacy Filling the Rx: [id number] - [place], [place] - [address] AT [facility] [address]  Filling Pharmacy Phone: [phone]  Filling Pharmacy Fax:    Start Date: 7/9/2025

## 2025-07-10 NOTE — TELEPHONE ENCOUNTER
Retail Pharmacy Prior Authorization Team   Phone: [phone]    PLEASE SEE TE DATED 05/30/2025    Approval already on file from [facility] through 06/05/2026    Spoke to [facility], he said it was hung up in their system but he has a paid claim now.     No further action required.

## 2025-07-25 ENCOUNTER — TELEPHONE (OUTPATIENT)
Dept: GASTROENTEROLOGY | Facility: CLINIC | Age: 45
End: 2025-07-25
Payer: COMMERCIAL

## 2025-07-25 NOTE — TELEPHONE ENCOUNTER
Prior Authorization Retail Medication Request    Medication/Dose: Voquezna  Diagnosis and ICD code (if different than what is on RX  Eosinophilic esophagitis [K20.0]          New/renewal/insurance change PA/secondary ins. PA:  Previously Tried and Failed:  Omeprazole,protonix,pepcid,prevacid  Rationale:  patient has tried all required PPIs and has continued to have symptoms of reflux. Also that PPIs have caused symptoms of anxiety, bloating, and constipation.     Insurance   Primary: [facility]   Insurance ID:    [id number]         Pharmacy Information (if different than what is on RX)  Name:  [facility]  Telephone Fax   [phone]       Clinic Information  Preferred routing pool for dept communication: [facility] esophageal

## 2025-07-30 NOTE — TELEPHONE ENCOUNTER
Retail Pharmacy Prior Authorization Team   Phone: [phone]    Prior Authorization Approval    Medication: VOQUEZNA 10 MG PO TABS  Authorization Effective Date: 7/30/2025  Authorization Expiration Date: 7/30/2026  Reference #:   [id number]  Insurance Company: [employer] - Phone [phone] Fax [phone]  Which Pharmacy is filling the prescription: [facility] - [place], [place] - [id number]  Pharmacy Notified: YES  Patient Notified: **Instructed pharmacy to notify patient when script is ready to /ship.**

## 2025-07-30 NOTE — TELEPHONE ENCOUNTER
Retail Pharmacy Prior Authorization Team   Phone: [phone]    PA Initiation    Medication: VOQUEZNA 10 MG PO TABS  Insurance Company: [employer] - Phone [phone] Fax [phone]  Pharmacy Filling the Rx: [facility] [place] - [id number]  Filling Pharmacy Phone: [phone]  Filling Pharmacy Fax:    Start Date: 7/30/2025      Note: Due to record-high volumes, our turn-around time is taking longer than usual . We are currently 3  business days behind in the pools.   We are working diligently to submit all requests in a timely manner and in the order they are received. Please only flag TRUE URGENT requests as high priority to the pool at this time.   If you have questions on status of PA's,  please send a note/message in the active PA encounter and send back to the [facility] PA pool [[id number]].    If you have questions about the turn-around time or about our process, please reach out to our supervisor Nubia Elaine.   Thank you!   RPPA (Retail Pharmacy Prior Authorization) team

## 2025-08-07 ASSESSMENT — HEADACHE IMPACT TEST (HIT 6)
HOW OFTEN HAVE YOU FELT FED UP OR IRRITATED BECAUSE OF YOUR HEADACHES: VERY OFTEN
WHEN YOU HAVE HEADACHES HOW OFTEN IS THE PAIN SEVERE: VERY OFTEN
HOW OFTEN DO HEADACHES LIMIT YOUR DAILY ACTIVITIES: SOMETIMES
HOW OFTEN HAVE YOU FELT TOO TIRED TO WORK BECAUSE OF YOUR HEADACHES: SOMETIMES
HIT6 TOTAL SCORE: 63
HOW OFTEN DID HEADACHS LIMIT CONCENTRATION ON WORK OR DAILY ACTIVITY: SOMETIMES
WHEN YOU HAVE A HEADACHE HOW OFTEN DO YOU WISH YOU COULD LIE DOWN: VERY OFTEN

## 2025-08-12 ENCOUNTER — OFFICE VISIT (OUTPATIENT)
Dept: NEUROLOGY | Facility: CLINIC | Age: 45
End: 2025-08-12
Payer: COMMERCIAL

## 2025-08-12 DIAGNOSIS — G43.709 CHRONIC MIGRAINE WITHOUT AURA WITHOUT STATUS MIGRAINOSUS, NOT INTRACTABLE: Primary | ICD-10-CM

## 2025-08-12 PROCEDURE — 64615 CHEMODENERV MUSC MIGRAINE: CPT | Performed by: NURSE PRACTITIONER

## 2025-08-15 ASSESSMENT — MIGRAINE DISABILITY ASSESSMENT (MIDAS)
HOW MANY DAYS WAS YOUR PRODUCTIVITY CUT IN HALF BECAUSE OF HEADACHES: 5
HOW MANY DAYS WAS HOUSEWORK PRODUCTIVITY CUT IN HALF DUE TO HEADACHES: 5
HOW MANY DAYS DID YOU MISS WORK OR SCHOOL BECAUSE OF HEADACHES: 3
ON A SCALE FROM 0-10 ON AVERAGE HOW PAINFUL WERE HEADACHES: 6
TOTAL SCORE: 22
HOW MANY DAYS IN THE PAST 3 MONTHS HAVE YOU HAD A HEADACHE: 6
HOW MANY DAYS DID YOU NOT DO HOUSEWORK BECAUSE OF HEADACHES: 5
HOW OFTEN WERE SOCIAL ACTIVITIES MISSED DUE TO HEADACHES: 4

## 2025-08-15 ASSESSMENT — HEADACHE IMPACT TEST (HIT 6)
HOW OFTEN DO HEADACHES LIMIT YOUR DAILY ACTIVITIES: SOMETIMES
WHEN YOU HAVE HEADACHES HOW OFTEN IS THE PAIN SEVERE: SOMETIMES
HIT6 TOTAL SCORE: 61
WHEN YOU HAVE A HEADACHE HOW OFTEN DO YOU WISH YOU COULD LIE DOWN: VERY OFTEN
HOW OFTEN HAVE YOU FELT FED UP OR IRRITATED BECAUSE OF YOUR HEADACHES: SOMETIMES
HOW OFTEN HAVE YOU FELT TOO TIRED TO WORK BECAUSE OF YOUR HEADACHES: SOMETIMES
HOW OFTEN DID HEADACHS LIMIT CONCENTRATION ON WORK OR DAILY ACTIVITY: SOMETIMES

## 2025-08-20 ENCOUNTER — VIRTUAL VISIT (OUTPATIENT)
Dept: NEUROLOGY | Facility: CLINIC | Age: 45
End: 2025-08-20
Payer: COMMERCIAL

## 2025-08-20 ENCOUNTER — TELEPHONE (OUTPATIENT)
Dept: NEUROLOGY | Facility: CLINIC | Age: 45
End: 2025-08-20

## 2025-08-20 VITALS — HEIGHT: 63 IN | BODY MASS INDEX: 22.86 KG/M2 | WEIGHT: 129 LBS

## 2025-08-20 DIAGNOSIS — G43.709 CHRONIC MIGRAINE WITHOUT AURA WITHOUT STATUS MIGRAINOSUS, NOT INTRACTABLE: Primary | ICD-10-CM

## 2025-08-20 ASSESSMENT — PAIN SCALES - GENERAL: PAINLEVEL_OUTOF10: NO PAIN (0)

## 2025-08-28 ENCOUNTER — VIRTUAL VISIT (OUTPATIENT)
Dept: MIDWIFE SERVICES | Facility: CLINIC | Age: 45
End: 2025-08-28
Payer: COMMERCIAL

## 2025-08-28 DIAGNOSIS — N92.0 MENORRHAGIA WITH REGULAR CYCLE: Primary | ICD-10-CM

## 2025-08-28 PROBLEM — Z00.00 ROUTINE GENERAL MEDICAL EXAMINATION AT A HEALTH CARE FACILITY: Status: RESOLVED | Noted: 2025-05-29 | Resolved: 2025-08-28

## 2025-09-02 ENCOUNTER — OFFICE VISIT (OUTPATIENT)
Dept: MIDWIFE SERVICES | Facility: CLINIC | Age: 45
End: 2025-09-02
Payer: COMMERCIAL

## 2025-09-02 VITALS
OXYGEN SATURATION: 99 % | HEART RATE: 80 BPM | WEIGHT: 113.8 LBS | SYSTOLIC BLOOD PRESSURE: 95 MMHG | BODY MASS INDEX: 20.16 KG/M2 | DIASTOLIC BLOOD PRESSURE: 61 MMHG

## 2025-09-02 DIAGNOSIS — Z32.00 PREGNANCY EXAMINATION OR TEST, PREGNANCY UNCONFIRMED: ICD-10-CM

## 2025-09-02 DIAGNOSIS — Z30.430 ENCOUNTER FOR INSERTION OF INTRAUTERINE CONTRACEPTIVE DEVICE: Primary | ICD-10-CM

## 2025-09-02 LAB — HCG UR QL: NEGATIVE

## 2025-09-02 PROCEDURE — 3074F SYST BP LT 130 MM HG: CPT | Performed by: ADVANCED PRACTICE MIDWIFE

## 2025-09-02 PROCEDURE — 58300 INSERT INTRAUTERINE DEVICE: CPT | Performed by: ADVANCED PRACTICE MIDWIFE

## 2025-09-02 PROCEDURE — 81025 URINE PREGNANCY TEST: CPT | Performed by: ADVANCED PRACTICE MIDWIFE

## 2025-09-02 PROCEDURE — 3078F DIAST BP <80 MM HG: CPT | Performed by: ADVANCED PRACTICE MIDWIFE

## (undated) RX ORDER — FENTANYL CITRATE 50 UG/ML
INJECTION, SOLUTION INTRAMUSCULAR; INTRAVENOUS
Status: DISPENSED
Start: 2023-10-18